# Patient Record
Sex: MALE | Race: WHITE | NOT HISPANIC OR LATINO | Employment: FULL TIME | URBAN - METROPOLITAN AREA
[De-identification: names, ages, dates, MRNs, and addresses within clinical notes are randomized per-mention and may not be internally consistent; named-entity substitution may affect disease eponyms.]

---

## 2016-01-18 LAB — HM COLONOSCOPY: NORMAL

## 2017-02-16 ENCOUNTER — APPOINTMENT (OUTPATIENT)
Dept: LAB | Facility: HOSPITAL | Age: 62
End: 2017-02-16
Payer: COMMERCIAL

## 2017-02-16 ENCOUNTER — TRANSCRIBE ORDERS (OUTPATIENT)
Dept: ADMINISTRATIVE | Facility: HOSPITAL | Age: 62
End: 2017-02-16

## 2017-02-16 DIAGNOSIS — Z12.5 ENCOUNTER FOR SCREENING FOR MALIGNANT NEOPLASM OF PROSTATE: ICD-10-CM

## 2017-02-16 DIAGNOSIS — I10 ESSENTIAL (PRIMARY) HYPERTENSION: ICD-10-CM

## 2017-02-16 DIAGNOSIS — Z00.00 ENCOUNTER FOR GENERAL ADULT MEDICAL EXAMINATION WITHOUT ABNORMAL FINDINGS: ICD-10-CM

## 2017-02-16 DIAGNOSIS — C85.91 NON-HODGKIN LYMPHOMA, UNSPECIFIED, LYMPH NODES OF HEAD, FACE, AND NECK (HCC): ICD-10-CM

## 2017-02-16 DIAGNOSIS — E78.5 HYPERLIPIDEMIA: ICD-10-CM

## 2017-02-16 LAB
ANION GAP SERPL CALCULATED.3IONS-SCNC: 11 MMOL/L (ref 4–13)
BUN SERPL-MCNC: 25 MG/DL (ref 5–25)
CALCIUM SERPL-MCNC: 9 MG/DL (ref 8.3–10.1)
CHLORIDE SERPL-SCNC: 103 MMOL/L (ref 100–108)
CHOLEST SERPL-MCNC: 221 MG/DL (ref 50–200)
CO2 SERPL-SCNC: 28 MMOL/L (ref 21–32)
CREAT SERPL-MCNC: 1.12 MG/DL (ref 0.6–1.3)
GFR SERPL CREATININE-BSD FRML MDRD: >60 ML/MIN/1.73SQ M
GLUCOSE SERPL-MCNC: 95 MG/DL (ref 65–140)
HDLC SERPL-MCNC: 45 MG/DL (ref 40–60)
LDLC SERPL CALC-MCNC: 157 MG/DL (ref 0–100)
POTASSIUM SERPL-SCNC: 4.1 MMOL/L (ref 3.5–5.3)
PSA SERPL-MCNC: 0.4 NG/ML (ref 0–4)
SODIUM SERPL-SCNC: 142 MMOL/L (ref 136–145)
TRIGL SERPL-MCNC: 97 MG/DL
TSH SERPL DL<=0.05 MIU/L-ACNC: 2.06 UIU/ML (ref 0.36–3.74)

## 2017-02-16 PROCEDURE — 84443 ASSAY THYROID STIM HORMONE: CPT

## 2017-02-16 PROCEDURE — 80048 BASIC METABOLIC PNL TOTAL CA: CPT

## 2017-02-16 PROCEDURE — G0103 PSA SCREENING: HCPCS

## 2017-02-16 PROCEDURE — 36415 COLL VENOUS BLD VENIPUNCTURE: CPT

## 2017-02-16 PROCEDURE — 80061 LIPID PANEL: CPT

## 2017-02-28 ENCOUNTER — ALLSCRIPTS OFFICE VISIT (OUTPATIENT)
Dept: OTHER | Facility: OTHER | Age: 62
End: 2017-02-28

## 2017-08-29 ENCOUNTER — ALLSCRIPTS OFFICE VISIT (OUTPATIENT)
Dept: OTHER | Facility: OTHER | Age: 62
End: 2017-08-29

## 2017-08-29 DIAGNOSIS — E78.5 HYPERLIPIDEMIA: ICD-10-CM

## 2017-08-29 LAB — OCCULT BLD, FECAL IMMUNOLOGICAL (HISTORICAL): NEGATIVE

## 2017-10-26 ENCOUNTER — GENERIC CONVERSION - ENCOUNTER (OUTPATIENT)
Dept: OTHER | Facility: OTHER | Age: 62
End: 2017-10-26

## 2017-10-26 ENCOUNTER — APPOINTMENT (OUTPATIENT)
Dept: LAB | Facility: HOSPITAL | Age: 62
End: 2017-10-26
Attending: INTERNAL MEDICINE
Payer: COMMERCIAL

## 2017-10-26 ENCOUNTER — TRANSCRIBE ORDERS (OUTPATIENT)
Dept: ADMINISTRATIVE | Facility: HOSPITAL | Age: 62
End: 2017-10-26

## 2017-10-26 DIAGNOSIS — E78.5 HYPERLIPIDEMIA: ICD-10-CM

## 2017-10-26 DIAGNOSIS — C85.91 NON-HODGKIN LYMPHOMA, UNSPECIFIED, LYMPH NODES OF HEAD, FACE, AND NECK (HCC): ICD-10-CM

## 2017-10-26 LAB
ALBUMIN SERPL BCP-MCNC: 4 G/DL (ref 3.5–5)
ALP SERPL-CCNC: 40 U/L (ref 46–116)
ALT SERPL W P-5'-P-CCNC: 30 U/L (ref 12–78)
ANION GAP SERPL CALCULATED.3IONS-SCNC: 8 MMOL/L (ref 4–13)
AST SERPL W P-5'-P-CCNC: 27 U/L (ref 5–45)
BASOPHILS # BLD AUTO: 0.04 THOUSANDS/ΜL (ref 0–0.1)
BASOPHILS NFR BLD AUTO: 1 % (ref 0–1)
BILIRUB SERPL-MCNC: 1.1 MG/DL (ref 0.2–1)
BUN SERPL-MCNC: 17 MG/DL (ref 5–25)
CALCIUM SERPL-MCNC: 8.5 MG/DL (ref 8.3–10.1)
CHLORIDE SERPL-SCNC: 104 MMOL/L (ref 100–108)
CHOLEST SERPL-MCNC: 222 MG/DL (ref 50–200)
CO2 SERPL-SCNC: 29 MMOL/L (ref 21–32)
CREAT SERPL-MCNC: 1.09 MG/DL (ref 0.6–1.3)
EOSINOPHIL # BLD AUTO: 0.14 THOUSAND/ΜL (ref 0–0.61)
EOSINOPHIL NFR BLD AUTO: 3 % (ref 0–6)
ERYTHROCYTE [DISTWIDTH] IN BLOOD BY AUTOMATED COUNT: 14.3 % (ref 11.6–15.1)
GFR SERPL CREATININE-BSD FRML MDRD: 71 ML/MIN/1.73SQ M
GLUCOSE P FAST SERPL-MCNC: 97 MG/DL (ref 65–99)
HCT VFR BLD AUTO: 50.7 % (ref 36.5–49.3)
HDLC SERPL-MCNC: 47 MG/DL (ref 40–60)
HGB BLD-MCNC: 16.9 G/DL (ref 12–17)
LDH SERPL-CCNC: 179 U/L (ref 81–234)
LDLC SERPL CALC-MCNC: 149 MG/DL (ref 0–100)
LYMPHOCYTES # BLD AUTO: 1.71 THOUSANDS/ΜL (ref 0.6–4.47)
LYMPHOCYTES NFR BLD AUTO: 31 % (ref 14–44)
MCH RBC QN AUTO: 29.4 PG (ref 26.8–34.3)
MCHC RBC AUTO-ENTMCNC: 33.3 G/DL (ref 31.4–37.4)
MCV RBC AUTO: 88 FL (ref 82–98)
MONOCYTES # BLD AUTO: 0.47 THOUSAND/ΜL (ref 0.17–1.22)
MONOCYTES NFR BLD AUTO: 9 % (ref 4–12)
NEUTROPHILS # BLD AUTO: 3.11 THOUSANDS/ΜL (ref 1.85–7.62)
NEUTS SEG NFR BLD AUTO: 56 % (ref 43–75)
PLATELET # BLD AUTO: 175 THOUSANDS/UL (ref 149–390)
PMV BLD AUTO: 10.7 FL (ref 8.9–12.7)
POTASSIUM SERPL-SCNC: 3.9 MMOL/L (ref 3.5–5.3)
PROT SERPL-MCNC: 7.2 G/DL (ref 6.4–8.2)
RBC # BLD AUTO: 5.75 MILLION/UL (ref 3.88–5.62)
SODIUM SERPL-SCNC: 141 MMOL/L (ref 136–145)
TRIGL SERPL-MCNC: 130 MG/DL
WBC # BLD AUTO: 5.47 THOUSAND/UL (ref 4.31–10.16)

## 2017-10-26 PROCEDURE — 85025 COMPLETE CBC W/AUTO DIFF WBC: CPT

## 2017-10-26 PROCEDURE — 83615 LACTATE (LD) (LDH) ENZYME: CPT

## 2017-10-26 PROCEDURE — 80053 COMPREHEN METABOLIC PANEL: CPT

## 2017-10-26 PROCEDURE — 80061 LIPID PANEL: CPT

## 2017-10-26 PROCEDURE — 36415 COLL VENOUS BLD VENIPUNCTURE: CPT

## 2017-10-30 ENCOUNTER — GENERIC CONVERSION - ENCOUNTER (OUTPATIENT)
Dept: OTHER | Facility: OTHER | Age: 62
End: 2017-10-30

## 2018-01-12 NOTE — PROGRESS NOTES
Assessment    1  Encounter for preventive health examination (V70 0) (Z00 00)   2  Screening for colon cancer (V76 51) (Z12 11)   3  Serrated adenoma of colon (211 3) (D12 6)   4  Intermittent claudication (443 9) (I73 9)   5  Apnea (786 03) (R06 81)   6  Snoring (786 09) (R06 83)   7  Need for tetanus booster (V03 7) (Z23)    Plan  Intermittent claudication    · VAS LOWER LIMB ARTERIAL DUPLEX, COMPLETE BILATERAL/GRAFTS;  SIDE:Bilateral; Status:Hold For - Scheduling; Requested for:2016;   Need for tetanus booster    · Adacel 5-15 5 LF-MCG/0 5 Intramuscular Suspension; INJECT 0 5  ML  Intramuscular; To Be Done: 28LFX2639  Serrated adenoma of colon    · COLONOSCOPY; Status:Active; Requested [id number];     Discussion/Summary  Impression: health maintenance visit  Currently, he eats a poor diet and has an inadequate exercise regimen  Prostate cancer screening: prostate cancer screening is current  Colorectal cancer screening: colorectal cancer screening is current  The immunizations will be given as outlined in the orders  Advice and education were given regarding aerobic exercise, weight bearing exercise and weight loss  Patient discussion: discussed with the patient, discussed with the patient's family  Also recommended sleep study d/t snoring and apnea - pt deferring - wishing to try for wgt loss    BW results D/w pt and LDL/TC elevated - diet/exercise/wgt loss d/w pt    ED will f/u with at next appt    LE claudication with dec RLE pulse - check LEAD  Chief Complaint  PE      History of Present Illness  HM, Adult Male: The patient is being seen for a health maintenance evaluation  The last health maintenance visit was few years  Social History: Household members include spouse  He is   Work status: working full time and occupation: material   The patient has never smoked cigarettes  He reports rare alcohol use and drinking 1 drinks per month   Alcohol concern:  no personal concern about alcohol use  General Health: The patient's health since the last visit is described as good  He has regular dental visits  He complains of vision problems  He has hearing loss  Immunizations status: not up to date   dentist in Oct - was told early gingivitis; wears glasses and just saw eye doc about year ago - goes every 2 yrs; deferring flu vaccine, is agreeable to tetanus vaccine  Lifestyle:  He does not have a healthy diet  He does not have any weight concerns  He does not exercise regularly  He does not use tobacco  He denies alcohol use  He denies drug use  he admits his diet could better - high carb intake but does eat some fruits and veggies  Reproductive health:  the patient is not sexually active  He complains of erectile dysfunction  Screening: Prostate cancer screening includes last prostate-specific antigen testing 2016  Colorectal cancer screening includes last colonoscopy done Jan 2016  Metabolic screening includes lipid profile performed 2016, glucose screening performed 2016 and thyroid function test performed 2016  Cardiovascular risk factors: high LDL cholesterol and obesity, but no diabetes  General health risks: no elevated prostate-specific antigen and no family history of prostate cancer  Safety elements used: seat belt  Risk findings: no depression symptoms  HPI: Had colonoscopy 1/18/16 - abnormal uptake on PET scan - path c/w serrated adenoma - pt has not heard back from GI yet    Wife notes apnea and snoring and fatigue    Pt c/o B/L LE leg pain with walking      Review of Systems    Constitutional: recent weight gain, but no fever and no chills  Eyes: no eyesight problems  ENT: no sore throat and no nasal discharge  Cardiovascular: intermittent leg claudication, but as noted in HPI, no chest pain and no palpitations  Respiratory: no shortness of breath and no cough  Gastrointestinal: no abdominal pain, no constipation and no diarrhea  Genitourinary: no dysuria, no urinary hesitancy and no nocturia  Musculoskeletal: no arthralgias and no myalgias  Integumentary: no rashes  Neurological: no headache and no dizziness  Psychiatric: no sleep disturbances and no depression  Endocrine: erectile dysfunction  Hematologic/Lymphatic: no tendency for easy bleeding and no tendency for easy bruising  Active Problems    1  Encounter for screening colonoscopy (V76 51) (Z12 11)   2  Lymphoma, head, face, or neck (202 81) (C85 91)   3  Obesity (278 00) (E66 9)   4  Prepatellar bursitis (726 65) (M70 40)   5  Scalp lesion (709 9) (L98 9)   6  Screening for endocrine, nutritional, metabolic and immunity disorder (V77 99) (Z13 9)   7  Screening for prostate cancer (V76 44) (Z12 5)   8  Shoulder joint pain, unspecified laterality (719 41) (M25 519)   9  Skin tag (701 9) (L91 8)   10  Umbilical hernia (673 0) (K42 9)    Past Medical History    · History of Abscess of arm, right (682 3) (L02 413)   · History of Abscess of skin (682 9) (L02 91)   · History of Arm wound (884 0) (S41 109A)   · History of herpes zoster (V12 09) (Z86 19)   · History of redness of eye (V12 49) (Z86 69)   · History of upper respiratory infection (V12 09) (Z87 09)   · History of Sebaceous cyst (706 2) (L72 3)   · History of Subconjunctival hemorrhage of left eye (372 72) (H11 32)    Surgical History    · History of Complete Colonoscopy   · History of Dental Surgery   · History of Incision And Drainage Of Skin Abscess   · History of Surgery Vas Deferens Vasectomy   · History of Tonsillectomy    Family History    · Family history of malignant neoplasm (V16 9) (Z80 9)    · Family history of Chronic Obstructive Pulmonary Disease    Social History    · Full-time employment   ·    · Never A Smoker   · Never Drank Alcohol    Current Meds   1  No Reported Medications Recorded    Allergies    1   No Known Drug Allergies    Vitals   Recorded: 84ELC2400 09:03AM   Temperature 96 4 F   Heart Rate 82   Systolic 937   Diastolic 82   Height 5 ft 8 in   Weight 239 lb    BMI Calculated 36 34   BSA Calculated 2 21     Physical Exam    Constitutional   General appearance: No acute distress, well appearing and well nourished  Ears, Nose, Mouth, and Throat   External inspection of ears and nose: Normal     Otoscopic examination: Tympanic membranes translucent with normal light reflex  Canals patent without erythema  Oropharynx: Normal with no erythema, edema, exudate or lesions  Neck   Neck: Supple, symmetric, trachea midline, no masses  Pulmonary   Respiratory effort: No increased work of breathing or signs of respiratory distress  Auscultation of lungs: Clear to auscultation  Cardiovascular   Auscultation of heart: Normal rate and rhythm, normal S1 and S2, no murmurs  dec R DP pulse  Examination of extremities for edema and/or varicosities: Normal     Abdomen   Abdomen: Non-tender, no masses  Anus, perineum, and rectum: Abnormal   hemorrhoids  Genitourinary   Digital rectal exam of prostate: Normal size, no masses  Lymphatic   Palpation of lymph nodes in neck: No lymphadenopathy  Musculoskeletal   Gait and station: Normal     Skin   Skin and subcutaneous tissue: Normal without rashes or lesions  Neurologic   Cranial nerves: Cranial nerves 2-12 intact  Psychiatric   Mood and affect: Normal        Results/Data  PHQ-2 Adult Depression Screening 22Jan2016 09:06AM User, Ahs     Test Name Result Flag Reference   PHQ-2 Adult Depression Score 1     Q1: 1, Q2: 0   PHQ-2 Adult Depression Screening Negative         Future Appointments    Date/Time Provider Specialty Site   10/03/2016 08:00 AM SARAN Matos   Hematology Oncology [facility]     Signatures   Electronically signed by : Elly Rojas DO; Jan 22 2016  9:56AM EST                       (Author)

## 2018-01-12 NOTE — RESULT NOTES
Discussion/Summary    please notify pt that his recent BW showed his cholesterol was still elevated but has improved slightly - total cholesterol went form 221 to 222 (should be below 200) and LDL "bad cholesterol" went from 157 to 149 - goal is <100, " HDL good cholesterol" and triglycerides were wnl, con't watching red meats/fatty foods and keep active, will d/w pt in detail at appt in March Pt aware         Verified Results  (1) LIPID PANEL, FASTING 26Oct2017 06:40AM Pj Gloria    Order Number: [id number]     Test Name Result Flag Reference   CHOLESTEROL 222 mg/dL H    HDL,DIRECT 47 mg/dL  40-60   Specimen collection should occur prior to Metamizole administration due to the potential for falsley depressed results  LDL CHOLESTEROL CALCULATED 149 mg/dL H 0-100   Triglyceride:        Normal <150 mg/dl   Borderline High 150-199 mg/dl   High 200-499 mg/dl   Very High >499 mg/dl      Cholesterol:       Desirable <200 mg/dl    Borderline High 200-239 mg/dl    High >239 mg/dl      HDL Cholesterol:       High>59 mg/dL    Low <41 mg/dL      This screening LDL is a calculated result  It does not have the accuracy of the Direct Measured LDL in the monitoring of patients with hyperlipidemia and/or statin therapy  Direct Measure LDL (APH909) must be ordered separately in these patients  TRIGLYCERIDES 130 mg/dL  <=150   Specimen collection should occur prior to N-Acetylcysteine or Metamizole administration due to the potential for falsely depressed results

## 2018-01-13 VITALS
TEMPERATURE: 96.7 F | HEART RATE: 88 BPM | SYSTOLIC BLOOD PRESSURE: 148 MMHG | HEIGHT: 68 IN | BODY MASS INDEX: 35.46 KG/M2 | BODY MASS INDEX: 37.61 KG/M2 | HEART RATE: 82 BPM | DIASTOLIC BLOOD PRESSURE: 88 MMHG | HEIGHT: 66 IN | WEIGHT: 234 LBS | DIASTOLIC BLOOD PRESSURE: 72 MMHG | SYSTOLIC BLOOD PRESSURE: 136 MMHG | TEMPERATURE: 97.8 F | WEIGHT: 234 LBS

## 2018-01-17 NOTE — RESULT NOTES
Verified Results  VAS LOWER LIMB ARTERIAL DUPLEX LIMITED - ARNAUD, PRESSURES, WAVEFORMS 51BZP1835 12:59PM Vernia Stairs     Test Name Result Flag Reference   VAS LOWER LIMB ARTERIAL DUPLEX LIMITED - ARNAUD, PRESSURES, WAVEFORMS (Report)     [facility] REPORT   CLINICAL:   Indications: PVD, Unspecified [I73 9] - Chemo and Radiation for lymphoma 4 yrs   ago, bilateral leg weakness noted within several months, w/ decreased Rt pedal   pulse  Denies claudication, rest pain  Some throbbing discomfort in thighs after   30 min walk  Clinical: Right Pressure: 132/ mm Hg, Left Pressure: 140/ mm Hg  FINDINGS:      Segment      Rig Left                    PSV PSV    Dist Post Tibial  160 160    Dist  Ant  Tibial 150 176             CONCLUSION:   Impression:   RIGHT LOWER LIMB:   This resting evaluation shows no evidence of significant lower extremity   arterial occlusive disease  Ankle/Brachial index: 1 14   Metatarsal is non-compressible  Great toe pressure of 124 mm Hg, supra-normal, may be unreliable      LEFT LOWER LIMB:   This resting evaluation shows no evidence of significant lower extremity   arterial occlusive disease  Ankle/Brachial index: 1 26, within normal limits   Metatarsal is non-compressible  Great toe pressure of 106 mm Hg, within the healing range      Recommend repeat testing in 1year as per protocol unless otherwise indicated  SIGNATURE:   Electronically Signed by: Stefan Johnson MD on 2016-02-03 05:27:48 PM        Pt aware  1      1 Amended By: Keith Malik;  Feb 04 2016 8:39 AM EST    Discussion/Summary   please notify pt that his Leg studies showed no sign of blockages in the arteries in the legs; no changes in meds at this time; will d/w pt further at next appt

## 2018-01-17 NOTE — PROGRESS NOTES
Assessment    1  Encounter for annual physical exam (V70 0) (Z00 00)   2  Hypertension (401 9) (I10)   3  Dyslipidemia (272 4) (E78 5)    Plan  Dyslipidemia    · (1) LIPID PANEL, FASTING; Status:Active; Requested for:54Ugd7606;   Encounter for annual physical exam    · Always use a seat belt and shoulder strap when riding or driving a motor vehicle ;  Status:Complete;   Done: 24UDV0709 04:37PM   · Brush your teeth {freq1} and floss at least once a day ; Status:Complete;   Done:  68Ros0022 04:37PM   · Drink plenty of fluids ; Status:Complete;   Done: 97WCE4471 04:37PM   · Eat a normal well-balanced diet ; Status:Complete;   Done: 81QFZ4002 04:37PM   · There are many exercise options for seniors ; Status:Complete;   Done: 85Ddw4636  04:37PM   · Use a sun block product with an SPF of 15 or more ; Status:Complete;   Done:  18QGA1154 04:37PM   · We encourage all of our patients to exercise regularly    30 minutes of exercise or physical  activity five or more days a week is recommended for children and adults ;  Status:Complete;   Done: 77Tjt1491 04:37PM   · We recommend routine visits to a dentist ; Status:Complete;   Done: 28FNJ6121  04:37PM   · We recommend that you bring your body mass index down to 26 ; Status:Complete;    Done: 78Ogt5375 04:37PM   · Hemoccult Screening - POC; Status:Active - Perform Order; Requested for:91Mji8046;   Hypertension    · Continue with our present treatment plan ; Status:Complete;   Done: 80Viq8949  04:39PM   · Restrict the salt in your diet by avoiding highly salted foods ; Status:Complete;   Done:  31XMZ0036 04:39PM   · There are many exercise options for seniors ; Status:Complete;   Done: 93Xar8363  04:39PM   · We recommend that you bring your body mass index down to 26 ; Status:Complete;    Done: 33Sdd0568 04:39PM   · Call [phone] if: You become dizzy or lightheaded, especially when you stand up  after sitting for a while ; Status:Complete;   Done: 54SSU4835 04:39PM   · Call [phone] if: You develop double vision (see two of everything) ;  Status:Complete;   Done: 93Rks0830 04:39PM   · Call 911 if: You experience a new kind of chest pain (angina) or pressure ;  Status:Complete;   Done: 37BSS0076 04:39PM   · Call 911 if: You have any symptoms of a stroke ; Status:Complete;   Done: 05RAF8779  04:39PM   · Seek Immediate Medical Attention if: You have a severe headache that will not go away ;  Status:Complete;   Done: 04Krg8968 04:39PM    Discussion/Summary  health maintenance visit Currently, he eats a poor diet and has an inadequate exercise regimen  Prostate cancer screening: prostate cancer screening is current  Colorectal cancer screening: colorectal cancer screening is current  Screening lab work includes lipid profile  The patient declines immunizations  Advice and education were given regarding nutrition, aerobic exercise, seat belt use and sunscreen use  Patient discussion: discussed with the patient  BP up today but not usually this high - will re-eval at next appt and if still high will need to adjust BP meds    Dyslipidemia -due for FLP - order given to be done with BW already ordered by Dr Hesham Escoto for Oct, urged to watch red meats/fatty foods and keep active  The patient was counseled regarding instructions for management, risk factor reductions, impressions  Chief Complaint  PE      History of Present Illness  HM, Adult Male: The patient is being seen for a health maintenance evaluation  The last health maintenance visit was 1 1/2 year(s) ago  Social History: Household members include spouse  He is   Work status: working full time and occupation: [employer]  work   The patient has never smoked cigarettes  He reports occasional alcohol use and drinking 1 drinks per week  Alcohol concern:  no personal concern about alcohol use  He has never used illicit drugs  General Health: The patient's health since the last visit is described as good   He has regular dental visits  He denies vision problems  He has hearing loss  Immunizations status:  dentist every 6 mos - has a broken tooth he has to have crown to put on it, wears glasses all the time, redness to L conjunctiva, slow and slight loss of hearing  Lifestyle:  He consumes a diverse and healthy diet  He does not have any weight concerns  He does not exercise regularly  He does not use tobacco  He consumes alcohol  He denies drug use  balanced diet but admits portion size could be better, he does no formal exercise  Reproductive health:  the patient is sexually active  He complains of erectile dysfunction  Screening: Prostate cancer screening includes last prostate-specific antigen testing Feb 2017  Colorectal cancer screening includes last colonoscopy done 2016  Metabolic screening includes lipid profile performed Feb 2017, glucose screening performed Feb 2017 and thyroid function test performed Feb 2017  Cardiovascular risk factors: hypertension, high LDL cholesterol and sedentary lifestyle, but no diabetes and no tobacco use  General health risks: no elevated prostate-specific antigen and no family history of prostate cancer  Safety elements used: seat belt and sunscreen  Risk findings: no depression symptoms  Review of Systems    Constitutional: recent 2 lb weight loss, but no fever and no chills  Eyes: no eyesight problems  ENT: no sore throat and no nasal discharge  Cardiovascular: no chest pain, no palpitations and no extremity edema  Respiratory: no shortness of breath, no cough and no wheezing  Gastrointestinal: no abdominal pain and no constipation  Genitourinary: no dysuria, no urinary hesitancy and no nocturia  Musculoskeletal: no arthralgias and no myalgias  Integumentary: no rashes  Neurological: no headache and no dizziness  Psychiatric: no anxiety and no depression  Endocrine: erectile dysfunction, but no muscle weakness     Hematologic/Lymphatic: no tendency for easy bleeding and no tendency for easy bruising  Active Problems    1  Apnea (786 03) (R06 81)   2  Colonoscopy (Fiberoptic) Screening   3  Dyslipidemia (272 4) (E78 5)   4  Encounter for screening colonoscopy (V76 51) (Z12 11)   5  Hypertension (401 9) (I10)   6  Intermittent claudication (443 9) (I73 9)   7  Lower back pain (724 2) (M54 5)   8  Lymphoma, head, face, or neck (202 81) (C85 91)   9  Macular degeneration (362 50) (H35 30)   10  Need for tetanus booster (V03 7) (Z23)   11  Obesity (278 00) (E66 9)   12  Scalp lesion (709 9) (L98 9)   13  Screening for colon cancer (V76 51) (Z12 11)   14  Screening for prostate cancer (V76 44) (Z12 5)   15  Serrated adenoma of colon (211 3) (D12 6)   16  Shoulder joint pain, unspecified laterality   17  Skin tag (701 9) (L91 8)   18  Snoring (786 09) (R06 83)   19   Umbilical hernia (239 3) (K42 9)    Past Medical History    · History of Abscess of arm, right (682 3) (L02 413)   · History of Abscess of skin (682 9) (L02 91)   · Denied: History of Alcohol abuse   · History of Arm wound (884 0) (S41 109A)   · Denied: History of depression   · History of herpes zoster (V12 09) (Z86 19)   · History of redness of eye (V12 49) (Z86 69)   · Denied: History of substance abuse   · History of upper respiratory infection (V12 09) (Z87 09)   · History of Prepatellar bursitis (726 65) (M70 40)   · History of Screening for endocrine, nutritional, metabolic and immunity disorder (V77 99)  (Z13 29,Z13 0,Z13 21,Z13 228)   · History of Sebaceous cyst (706 2) (L72 3)   · History of Subconjunctival hemorrhage of left eye (372 72) (H11 32)    Surgical History    · History of Complete Colonoscopy   · History of Dental Surgery   · History of Incision And Drainage Of Skin Abscess   · History of Surgery Vas Deferens Vasectomy   · History of Tonsillectomy    Family History  Mother    · Denied: Family history of Alcohol abuse   · Denied: Family history of depression   · Family history of malignant neoplasm (V16 9) (Z80 9)   · Denied: Family history of substance abuse  Father    · Denied: Family history of Alcohol abuse   · Family history of Chronic Obstructive Pulmonary Disease   · Denied: Family history of depression   · Denied: Family history of substance abuse  Sibling    · Denied: Family history of Alcohol abuse   · Denied: Family history of depression   · Denied: Family history of substance abuse    Social History    · Full-time employment   ·    · Never A Smoker   · Never Drank Alcohol    Current Meds   1  HydroCHLOROthiazide 25 MG Oral Tablet; TAKE 1 TABLET DAILY; Therapy: 33Hhc4005 to (Evaluate:58Quj6362)  Requested for: 25YQU8847; Last   Rx:87Otz2709 Ordered    Allergies    1  No Known Drug Allergies    Vitals   Recorded: 76Pff2122 04:07PM   Temperature 97 8 F   Heart Rate 88   Systolic 849   Diastolic 88   Height 5 ft 6 in   Weight 234 lb    BMI Calculated 37 77   BSA Calculated 2 14     Physical Exam    Constitutional   General appearance: No acute distress, well appearing and well nourished  Eyes   Conjunctiva and lids: No erythema, swelling or discharge  Pupils and irises: Equal, round, reactive to light  Ears, Nose, Mouth, and Throat   External inspection of ears and nose: Normal     Otoscopic examination: Tympanic membranes translucent with normal light reflex  Canals patent without erythema  Hearing: Normal     Oropharynx: Normal with no erythema, edema, exudate or lesions  Neck   Neck: Supple, symmetric, trachea midline, no masses  Pulmonary   Respiratory effort: No increased work of breathing or signs of respiratory distress  Auscultation of lungs: Clear to auscultation  Cardiovascular   Auscultation of heart: Normal rate and rhythm, normal S1 and S2, no murmurs  Carotid pulses: 2+ bilaterally  Examination of extremities for edema and/or varicosities: Normal     Abdomen   Abdomen: Non-tender, no masses      Anus, perineum, and rectum: Normal sphincter tone, no masses, no prolapse  Stool sample for occult blood: Negative  Genitourinary   Digital rectal exam of prostate: Normal size, no masses  Lymphatic   Palpation of lymph nodes in neck: No lymphadenopathy  Musculoskeletal   Gait and station: Normal     Skin   Skin and subcutaneous tissue: Normal without rashes or lesions  Psychiatric   Judgment and insight: Normal     Mood and affect: Normal        Results/Data  PHQ-2 Adult Depression Screening 09Fsk5807 04:11PM User, Ahs     Test Name Result Flag Reference   PHQ-2 Adult Depression Score 0     Over the last two weeks, how often have you been bothered by any of the following problems? Little interest or pleasure in doing things: Not at all - 0  Feeling down, depressed, or hopeless: Not at all - 0   PHQ-2 Adult Depression Screening Negative         Future Appointments    Date/Time Provider Specialty Site   10/16/2017 08:00 AM SARAN Pino   Hematology Oncology [facility]     Signatures   Electronically signed by : Jillian Rea DO; Aug 29 2017  4:40PM EST                       (Author)

## 2018-01-22 VITALS
RESPIRATION RATE: 16 BRPM | SYSTOLIC BLOOD PRESSURE: 130 MMHG | OXYGEN SATURATION: 97 % | HEIGHT: 66 IN | BODY MASS INDEX: 38.73 KG/M2 | DIASTOLIC BLOOD PRESSURE: 82 MMHG | HEART RATE: 64 BPM | WEIGHT: 241 LBS | TEMPERATURE: 97.5 F

## 2018-02-20 PROBLEM — H35.30 MACULAR DEGENERATION: Status: ACTIVE | Noted: 2017-02-28

## 2018-02-20 RX ORDER — HYDROCHLOROTHIAZIDE 25 MG/1
1 TABLET ORAL DAILY
COMMUNITY
Start: 2016-04-25 | End: 2018-03-01 | Stop reason: DRUGHIGH

## 2018-02-26 ENCOUNTER — TELEPHONE (OUTPATIENT)
Dept: HEMATOLOGY ONCOLOGY | Facility: HOSPITAL | Age: 63
End: 2018-02-26

## 2018-02-26 NOTE — TELEPHONE ENCOUNTER
If this is just an infection, I would have him follow up with the oral surgeon  If he is concerned he can always schedule a follow up appointment to see Dr Joshua Dumas to discuss

## 2018-02-28 NOTE — TELEPHONE ENCOUNTER
Patient stopped in to clarify whether he should schedule appt with Dr Jimbo Dawson  After discussion with Marjorie Lane RN I told him he should see oral surgeon first to determine if this is in fact an infection

## 2018-03-01 ENCOUNTER — OFFICE VISIT (OUTPATIENT)
Dept: FAMILY MEDICINE CLINIC | Facility: HOSPITAL | Age: 63
End: 2018-03-01
Payer: COMMERCIAL

## 2018-03-01 VITALS
HEIGHT: 68 IN | DIASTOLIC BLOOD PRESSURE: 84 MMHG | HEART RATE: 72 BPM | SYSTOLIC BLOOD PRESSURE: 152 MMHG | BODY MASS INDEX: 36.68 KG/M2 | TEMPERATURE: 98 F | WEIGHT: 242 LBS

## 2018-03-01 DIAGNOSIS — I10 HYPERTENSION, UNSPECIFIED TYPE: ICD-10-CM

## 2018-03-01 DIAGNOSIS — C85.91 LYMPHOMA, HEAD, FACE, OR NECK: Primary | ICD-10-CM

## 2018-03-01 DIAGNOSIS — E78.5 DYSLIPIDEMIA: ICD-10-CM

## 2018-03-01 PROCEDURE — 99214 OFFICE O/P EST MOD 30 MIN: CPT | Performed by: INTERNAL MEDICINE

## 2018-03-01 RX ORDER — LISINOPRIL AND HYDROCHLOROTHIAZIDE 12.5; 1 MG/1; MG/1
1 TABLET ORAL DAILY
Qty: 90 TABLET | Refills: 0 | Status: SHIPPED | OUTPATIENT
Start: 2018-03-01 | End: 2018-04-13 | Stop reason: SINTOL

## 2018-03-01 NOTE — ASSESSMENT & PLAN NOTE
LDL/TC not at goal - RF for CVD reviewed - 10 yr risk for ASCVD was reviewed and was elevated at 20 3%, risk with optimum RF modification was 7 3 %, will give pt 6 mos of lifestyle modification and if not better will need statin - low fat/cholesterol diet/exercise/wgt loss reviewed again

## 2018-03-01 NOTE — PROGRESS NOTES
Assessment/Plan:    Hypertension  BP not at goal - STOP HCTZ 25 mg and change to Lisinopril-HCT 10/12 5 1 tab PO q day, advised to watch salt in diet and to start exercise and get wgt down, recheck BP in 6 wk, call with SE or issues    Lymphoma, head, face, or neck (HCC)  Very anxious as similar symptoms as when lymphoma was dx at same exact site - has appt with oral surgeon next week, told to call with any issue    Dyslipidemia  LDL/TC not at goal - RF for CVD reviewed - 10 yr risk for ASCVD was reviewed and was elevated at 20 3%, risk with optimum RF modification was 7 3 %, will give pt 6 mos of lifestyle modification and if not better will need statin - low fat/cholesterol diet/exercise/wgt loss reviewed again       Diagnoses and all orders for this visit:    Lymphoma, head, face, or neck (HCC)    Dyslipidemia    Hypertension, unspecified type  -     lisinopril-hydrochlorothiazide (PRINZIDE,ZESTORETIC) 10-12 5 MG per tablet; Take 1 tablet by mouth daily for 90 days          Subjective:      Patient ID: Alhaji Males is a 57 y o  male  HPI Pt here for routine follow up appt    Pt has seen Dr Vanessa Lennon for f/u lymphoma - dx 6 yrs ago  He was given order for BW to be done Oct 2018 and told to f/u annually  He has been having issues with 3-4 wks of sore gums and inflammation around tooth where his cancer started  He saw his nml dentist last week and was told to f/u with oral surgeon  He notes no F/C/wgt loss/night sweat/rashes  He was told to f/u with oral surgeon and he has appt next week  Pt had FLP done in Oct - results d/w pt in detail: , ,  and HDL 47  Goal FLP reviewed  RF for CVD reviewed  Diet/exercise reviewed  He states his diet is not high in red meats and he does eat a lot of fish  He could cut back on fatty foods and sweets  He does no formal exercise  BP above goal again today and meds were reviewed and no changes have occurred    He denies missing doses of meds or SE with the meds  He does not check his BP outside the office  He notes no frequent Ha's/dizziness/double vision/CP  He has noted issues with his R arm falling asleep when he lays on his R side  He notes no symptoms at all during the day and it resolves around 15 - 20 min after waking up  Review of Systems   Constitutional: Negative for chills, fatigue, fever and unexpected weight change  HENT: Positive for mouth sores  Negative for congestion and sinus pain  Eyes: Negative for pain and redness  Respiratory: Negative for cough, chest tightness and shortness of breath  Cardiovascular: Negative for chest pain, palpitations and leg swelling  Gastrointestinal: Negative for abdominal pain, blood in stool, constipation, diarrhea, nausea and vomiting  Endocrine: Negative for polydipsia and polyuria  Genitourinary: Negative for difficulty urinating and dysuria  Musculoskeletal: Negative for arthralgias and myalgias  Skin: Negative for rash and wound  Neurological: Negative for dizziness and headaches  Hematological: Does not bruise/bleed easily  Psychiatric/Behavioral: Negative for behavioral problems and confusion  Objective:  /84   Pulse 72   Temp 98 °F (36 7 °C)   Ht 5' 8" (1 727 m)   Wt 110 kg (242 lb)   BMI 36 80 kg/m²      Physical Exam   Constitutional: He appears well-developed and well-nourished  HENT:   Head: Normocephalic and atraumatic  Right Ear: External ear normal    Left Ear: External ear normal    Mouth/Throat: No oropharyngeal exudate  R upper incisor with erythema and inflammation at gum - no ulceration present   Eyes: Conjunctivae are normal  Right eye exhibits no discharge  Left eye exhibits no discharge  Neck: Neck supple  No tracheal deviation present  Cardiovascular: Normal rate and regular rhythm  No murmur heard  Pulmonary/Chest: Effort normal and breath sounds normal  No respiratory distress  He has no wheezes   He has no rales    Abdominal: Soft  He exhibits no distension  There is no tenderness  Musculoskeletal: He exhibits no edema  Lymphadenopathy:     He has no cervical adenopathy  Neurological: He is alert  He exhibits normal muscle tone  Skin: Skin is warm and dry  No rash noted  Psychiatric: He has a normal mood and affect  His behavior is normal    Nursing note and vitals reviewed

## 2018-03-01 NOTE — ASSESSMENT & PLAN NOTE
BP not at goal - STOP HCTZ 25 mg and change to Lisinopril-HCT 10/12 5 1 tab PO q day, advised to watch salt in diet and to start exercise and get wgt down, recheck BP in 6 wk, call with SE or issues

## 2018-03-01 NOTE — ASSESSMENT & PLAN NOTE
Very anxious as similar symptoms as when lymphoma was dx at same exact site - has appt with oral surgeon next week, told to call with any issue

## 2018-04-13 ENCOUNTER — OFFICE VISIT (OUTPATIENT)
Dept: FAMILY MEDICINE CLINIC | Facility: HOSPITAL | Age: 63
End: 2018-04-13
Payer: COMMERCIAL

## 2018-04-13 VITALS
DIASTOLIC BLOOD PRESSURE: 90 MMHG | HEIGHT: 68 IN | TEMPERATURE: 98.4 F | SYSTOLIC BLOOD PRESSURE: 158 MMHG | WEIGHT: 238 LBS | BODY MASS INDEX: 36.07 KG/M2 | HEART RATE: 78 BPM

## 2018-04-13 DIAGNOSIS — Z12.5 SCREENING FOR PROSTATE CANCER: ICD-10-CM

## 2018-04-13 DIAGNOSIS — R05.8 COUGH DUE TO ACE INHIBITOR: ICD-10-CM

## 2018-04-13 DIAGNOSIS — R20.2 PARESTHESIAS IN RIGHT HAND: ICD-10-CM

## 2018-04-13 DIAGNOSIS — Z13.29 SCREENING FOR THYROID DISORDER: ICD-10-CM

## 2018-04-13 DIAGNOSIS — E78.5 DYSLIPIDEMIA: ICD-10-CM

## 2018-04-13 DIAGNOSIS — I10 ESSENTIAL HYPERTENSION: Primary | ICD-10-CM

## 2018-04-13 DIAGNOSIS — T46.4X5A COUGH DUE TO ACE INHIBITOR: ICD-10-CM

## 2018-04-13 PROCEDURE — 99214 OFFICE O/P EST MOD 30 MIN: CPT | Performed by: INTERNAL MEDICINE

## 2018-04-13 RX ORDER — HYDROCHLOROTHIAZIDE 25 MG/1
25 TABLET ORAL DAILY
Qty: 30 TABLET | Refills: 3 | Status: SHIPPED | OUTPATIENT
Start: 2018-04-13 | End: 2018-07-31 | Stop reason: SDUPTHER

## 2018-04-13 RX ORDER — AMLODIPINE BESYLATE 5 MG/1
5 TABLET ORAL DAILY
Qty: 30 TABLET | Refills: 2 | Status: SHIPPED | OUTPATIENT
Start: 2018-04-13 | End: 2018-05-31 | Stop reason: SDUPTHER

## 2018-04-13 NOTE — ASSESSMENT & PLAN NOTE
Pt with SE with lisinopril-HCT and no real benefit noted in his readings - will D/C lisinopril-HCT and start HCTZ 25 mg 1 tab PO q day and Amlodipine 5 mg 1 tab PO q day, recheck BP in 4-6 wks, again low sodium diet and avoiding NSAIDs was encouraged as was wgt loss

## 2018-04-13 NOTE — PROGRESS NOTES
Assessment/Plan:    Dyslipidemia  Pt due for FLP the end of this month; 10 yr ASCVD risk was calculated last time at 20 3% and 6 mo trial of diet/exercise was reviewed - if not better will need to review statin benefit and start rx    Hypertension  Pt with SE with lisinopril-HCT and no real benefit noted in his readings - will D/C lisinopril-HCT and start HCTZ 25 mg 1 tab PO q day and Amlodipine 5 mg 1 tab PO q day, recheck BP in 4-6 wks, again low sodium diet and avoiding NSAIDs was encouraged as was wgt loss       Diagnoses and all orders for this visit:    Essential hypertension  -     hydrochlorothiazide (HYDRODIURIL) 25 mg tablet; Take 1 tablet (25 mg total) by mouth daily for 30 days  -     amLODIPine (NORVASC) 5 mg tablet; Take 1 tablet (5 mg total) by mouth daily for 30 days  -     TSH, 3rd generation with T4 reflex; Future    Cough due to ACE inhibitor  Comments:  D/w pt that cough poss d/t ACE vs chronic post nasal drip - will stop ACE and re-eval next appt - if not better will need Flonase/OTC antihistamine and CXR    Paresthesias in right hand  Comments:  D/w pt that sounds c/w cubital tunnel syndrome/ulnar nerve compression, symptoms usually associated with pressure on forearm/certain position- advised to avoid these positions, if not better will need EMG or poss Ortho eval - pt voiced understanding    Dyslipidemia  -     Lipid panel; Future  -     TSH, 3rd generation with T4 reflex; Future    Screening for prostate cancer  -     PSA, Total Screen; Future    Screening for thyroid disorder  -     TSH, 3rd generation with T4 reflex; Future      [place] Jan 2016    Subjective:      Patient ID: Margaret Zepeda is a 57 y o  male  HPI Pt here for BP check    Last visit pts BP was elevated at 152/84 and we subsequently changed his HCTZ 25 mg q day to lisinopril - HCT 10/12 5 1 tab PO q day  Pt was advised to watch sodium in diet as well as increase exercise and geet wgt down    He is here today for BP check  Wgt is down 4 lbs from last appt but BP is more elevated  He has noted a cough and hoarseness since starting the medication  He does note chronic postnasal for years  He notes no other Se  He did only check BP once since last appt but thinks it was about what it was today  He notes no frequent Ha's/dizziness/double vision/Cp/palp  He notes no SOB/wheezing/F/C with the cough/hoarseness  Pt con't to notes numbness and tingling of RUE intermittently for some time  It was at first just when he woke up and seemed to be d/t laying on his R side  He than began to note the symptoms if he leaned on his R forearm  Now he has started fishing again and notes the symptoms if he is holding his fishing pole for long periods  He notes no significant weakness/dropping objects  He is due for cholesterol and TFTs/PSA at the end of April      [facility] 2016    Review of Systems   Constitutional: Negative for chills, fever and unexpected weight change  HENT: Positive for postnasal drip  Negative for congestion and sinus pain  Eyes: Negative for pain and redness  Respiratory: Positive for cough  Negative for chest tightness and shortness of breath  Cardiovascular: Negative for chest pain, palpitations and leg swelling  Gastrointestinal: Negative for abdominal pain, diarrhea, nausea and vomiting  Endocrine: Negative for polydipsia and polyuria  Genitourinary: Negative for difficulty urinating and dysuria  Skin: Negative for rash and wound  Neurological: Negative for dizziness, weakness and headaches  Hematological: Does not bruise/bleed easily  Psychiatric/Behavioral: Negative for behavioral problems and confusion  Objective:    /90   Pulse 78   Temp 98 4 °F (36 9 °C)   Ht 5' 8" (1 727 m)   Wt 108 kg (238 lb)   BMI 36 19 kg/m²      Physical Exam   Constitutional: He appears well-developed and well-nourished  No distress  HENT:   Head: Normocephalic and atraumatic  Mouth/Throat: No oropharyngeal exudate  Eyes: Conjunctivae are normal  Right eye exhibits no discharge  Left eye exhibits no discharge  Neck: Neck supple  No tracheal deviation present  Cardiovascular: Normal rate and regular rhythm  No murmur heard  Pulmonary/Chest: Effort normal and breath sounds normal  No respiratory distress  He has no wheezes  He has no rales  Abdominal: Soft  He exhibits no distension  There is no tenderness  Musculoskeletal: He exhibits no edema  nml gait  5/5 B/L UE muscle strength   Neurological: He is alert  He has normal reflexes  He exhibits normal muscle tone  Sensation intact to light touch B/L UE  2/4 bicep reflexes B/L   Skin: Skin is warm and dry  No rash noted  Psychiatric: He has a normal mood and affect  His behavior is normal    Nursing note and vitals reviewed

## 2018-04-13 NOTE — ASSESSMENT & PLAN NOTE
Pt due for FLP the end of this month; 10 yr ASCVD risk was calculated last time at 20 3% and 6 mo trial of diet/exercise was reviewed - if not better will need to review statin benefit and start rx

## 2018-05-03 ENCOUNTER — OFFICE VISIT (OUTPATIENT)
Dept: FAMILY MEDICINE CLINIC | Facility: HOSPITAL | Age: 63
End: 2018-05-03
Payer: COMMERCIAL

## 2018-05-03 ENCOUNTER — HOSPITAL ENCOUNTER (OUTPATIENT)
Dept: RADIOLOGY | Facility: HOSPITAL | Age: 63
Discharge: HOME | End: 2018-05-03
Payer: COMMERCIAL

## 2018-05-03 VITALS
WEIGHT: 234.8 LBS | SYSTOLIC BLOOD PRESSURE: 128 MMHG | BODY MASS INDEX: 35.58 KG/M2 | DIASTOLIC BLOOD PRESSURE: 82 MMHG | TEMPERATURE: 98.6 F | HEIGHT: 68 IN | HEART RATE: 76 BPM

## 2018-05-03 DIAGNOSIS — M25.562 ACUTE PAIN OF LEFT KNEE: Primary | ICD-10-CM

## 2018-05-03 DIAGNOSIS — M25.562 ACUTE PAIN OF LEFT KNEE: ICD-10-CM

## 2018-05-03 PROCEDURE — 99214 OFFICE O/P EST MOD 30 MIN: CPT | Performed by: NURSE PRACTITIONER

## 2018-05-03 PROCEDURE — 73562 X-RAY EXAM OF KNEE 3: CPT

## 2018-05-03 RX ORDER — MELOXICAM 15 MG/1
15 TABLET ORAL DAILY
Qty: 30 TABLET | Refills: 0 | Status: SHIPPED | OUTPATIENT
Start: 2018-05-03 | End: 2018-05-31

## 2018-05-03 NOTE — PROGRESS NOTES
Assessment/Plan:     Acute left knee pain w/o known injury  Concern for possible MCL injury  Will start with xray but will ultimately need MRI  Script for meloxicam  Instructed no other NSAIDs  Diagnoses and all orders for this visit:    Acute pain of left knee  -     XR knee 3 vw left non injury; Future  -     meloxicam (MOBIC) 15 mg tablet; Take 1 tablet (15 mg total) by mouth daily          Subjective:     Patient ID: Geoff Grover is a 57 y o  male  Left knee pain  Started about 1 week ago  Bending makes worse  Turning to side makes worse  Started after he took leg out of car to get gas  No pain at first but then as day went on pain worsened  Able to bear weight  Some redness but not swelling  Not hot or warm  Doing both moist heat and ice  Does not make better  Was taking OTC arthritis med  Helps a little  Very stiff first thing in the morning  Constant aching pain  Denies any injury or previous problems  2-3/10 pain  Unable to do his normal activities  Denies N/T  Review of Systems   Constitutional: Negative for chills and fever  Musculoskeletal: Positive for arthralgias  Negative for joint swelling  Neurological: Negative for weakness and numbness  The following portions of the patient's history were reviewed and updated as appropriate: allergies, current medications, past family history, past medical history, past social history, past surgical history and problem list     Objective:  Vitals:    05/03/18 1533   BP: 128/82   Pulse: 76   Temp: 98 6 °F (37 °C)      Physical Exam   Constitutional: He appears well-developed and well-nourished  Cardiovascular: Normal rate, regular rhythm and normal heart sounds  Pulmonary/Chest: Effort normal and breath sounds normal    Musculoskeletal:        Left knee: He exhibits swelling  He exhibits no erythema  Tenderness found  Medial joint line tenderness noted  Limping favoring left side  Pain worst with extension     Pain with valgus and varus stress

## 2018-05-07 ENCOUNTER — TELEPHONE (OUTPATIENT)
Dept: FAMILY MEDICINE CLINIC | Facility: HOSPITAL | Age: 63
End: 2018-05-07

## 2018-05-08 ENCOUNTER — TELEPHONE (OUTPATIENT)
Dept: FAMILY MEDICINE CLINIC | Facility: HOSPITAL | Age: 63
End: 2018-05-08

## 2018-05-29 ENCOUNTER — APPOINTMENT (OUTPATIENT)
Dept: LAB | Facility: HOSPITAL | Age: 63
End: 2018-05-29
Payer: COMMERCIAL

## 2018-05-29 DIAGNOSIS — Z13.29 SCREENING FOR THYROID DISORDER: ICD-10-CM

## 2018-05-29 DIAGNOSIS — I10 ESSENTIAL HYPERTENSION: ICD-10-CM

## 2018-05-29 DIAGNOSIS — Z12.5 SCREENING FOR PROSTATE CANCER: ICD-10-CM

## 2018-05-29 DIAGNOSIS — E78.5 DYSLIPIDEMIA: ICD-10-CM

## 2018-05-29 LAB
CHOLEST SERPL-MCNC: 203 MG/DL (ref 50–200)
HDLC SERPL-MCNC: 53 MG/DL (ref 40–60)
LDLC SERPL CALC-MCNC: 135 MG/DL (ref 0–100)
NONHDLC SERPL-MCNC: 150 MG/DL
PSA SERPL-MCNC: 0.4 NG/ML (ref 0–4)
TRIGL SERPL-MCNC: 77 MG/DL
TSH SERPL DL<=0.05 MIU/L-ACNC: 1.72 UIU/ML (ref 0.36–3.74)

## 2018-05-29 PROCEDURE — 84443 ASSAY THYROID STIM HORMONE: CPT

## 2018-05-29 PROCEDURE — 36415 COLL VENOUS BLD VENIPUNCTURE: CPT

## 2018-05-29 PROCEDURE — G0103 PSA SCREENING: HCPCS

## 2018-05-29 PROCEDURE — 80061 LIPID PANEL: CPT

## 2018-05-31 ENCOUNTER — OFFICE VISIT (OUTPATIENT)
Dept: FAMILY MEDICINE CLINIC | Facility: HOSPITAL | Age: 63
End: 2018-05-31
Payer: COMMERCIAL

## 2018-05-31 VITALS
TEMPERATURE: 98.2 F | BODY MASS INDEX: 35.31 KG/M2 | DIASTOLIC BLOOD PRESSURE: 90 MMHG | HEART RATE: 67 BPM | HEIGHT: 68 IN | SYSTOLIC BLOOD PRESSURE: 142 MMHG | WEIGHT: 233 LBS

## 2018-05-31 DIAGNOSIS — M25.562 LEFT KNEE PAIN, UNSPECIFIED CHRONICITY: ICD-10-CM

## 2018-05-31 DIAGNOSIS — I10 ESSENTIAL HYPERTENSION: ICD-10-CM

## 2018-05-31 DIAGNOSIS — E78.5 DYSLIPIDEMIA: Primary | ICD-10-CM

## 2018-05-31 PROCEDURE — 99214 OFFICE O/P EST MOD 30 MIN: CPT | Performed by: INTERNAL MEDICINE

## 2018-05-31 RX ORDER — AMLODIPINE BESYLATE 10 MG/1
10 TABLET ORAL DAILY
Qty: 30 TABLET | Refills: 3 | Status: SHIPPED | OUTPATIENT
Start: 2018-05-31 | End: 2018-07-31 | Stop reason: SDUPTHER

## 2018-05-31 NOTE — ASSESSMENT & PLAN NOTE
FLP improved but not yet at goal, admits diet is not so good so urged to really start watching red meats/fatty foods and eat lean meats/fruits/veggies and start regular exercise, recheck in 6 mos

## 2018-05-31 NOTE — ASSESSMENT & PLAN NOTE
Bp seems to responding nicely to amlodipine - not yet at goal so will increase to 10 mg 1 tab PO q day, con't current HCTZ, recheck in 6 wks

## 2018-05-31 NOTE — PROGRESS NOTES
Assessment/Plan:    Hypertension  Bp seems to responding nicely to amlodipine - not yet at goal so will increase to 10 mg 1 tab PO q day, con't current HCTZ, recheck in 6 wks    Dyslipidemia  FLP improved but not yet at goal, admits diet is not so good so urged to really start watching red meats/fatty foods and eat lean meats/fruits/veggies and start regular exercise, recheck in 6 mos       Diagnoses and all orders for this visit:    Dyslipidemia    Essential hypertension  -     amLODIPine (NORVASC) 10 mg tablet; Take 1 tablet (10 mg total) by mouth daily for 30 days    Left knee pain, unspecified chronicity  Comments:  Saw [facility] and was given injection and used brace - finished Mobic and is now feeling better, R knee popping/clicking but no significant pain, con't to monitor and f/u with Ortho if pain relapses          Subjective:      Patient ID: Yoana Armstrong is a 57 y o  male  HPI Pt here for follow up appt    Last visit in mid April we stopped pts Lisinopril-HCT and switched him to Norvasc 5 mg 1 tab daily and HCTZ 25 mg 1 tab PO q day  He was counseled on low sodium diet and avoiding NSAIDs  He is here today for BP check  He did see Corrie King in the interm and at that visit his BP was 128/82  He notes no SE with the med changes  He does not check BP at home but has had a few other doc appts and readings usually 140's/ 80's  He was taking Mobic for a short time when his knee was bothering him (see below) but he is off that now  He denies regular OTC NSAID use  Pt did see [facility] (Dr Soni Hunt) in [facility] for L knee pain  He was told it could be a MCL tear and discussed steroid and brace vs MRI  He did an injection and wore the brace and his L knee improved and now his R knee is popping and clicking  BW results were d/w pt in detail: FLP with still some slight elevation of TC/LDL but improved from Oct, PSA/TSH wnl  He is down 5 lbs from April 2018        Review of Systems Constitutional: Negative for chills and fever  HENT: Negative for congestion and sinus pain  Eyes: Negative for pain and redness  Respiratory: Negative for cough, chest tightness and shortness of breath  Cardiovascular: Negative for chest pain, palpitations and leg swelling  Gastrointestinal: Negative for abdominal pain, constipation, diarrhea, nausea and vomiting  Endocrine: Negative for polydipsia and polyuria  Genitourinary: Negative for difficulty urinating and dysuria  Musculoskeletal: Positive for arthralgias  Negative for myalgias  Skin: Negative for rash and wound  Neurological: Negative for dizziness and headaches  Hematological: Does not bruise/bleed easily  Psychiatric/Behavioral: Negative for behavioral problems and confusion  Objective:    /90   Pulse 67   Temp 98 2 °F (36 8 °C)   Ht 5' 8" (1 727 m)   Wt 106 kg (233 lb)   BMI 35 43 kg/m²      Physical Exam   Constitutional: He appears well-developed and well-nourished  No distress  HENT:   Head: Normocephalic and atraumatic  Mouth/Throat: No oropharyngeal exudate  Eyes: Conjunctivae are normal  Right eye exhibits no discharge  Left eye exhibits no discharge  Neck: Neck supple  No tracheal deviation present  Cardiovascular: Normal rate and regular rhythm  No murmur heard  Pulmonary/Chest: Effort normal and breath sounds normal  No respiratory distress  He has no wheezes  He has no rales  Abdominal: Soft  He exhibits no distension  There is no tenderness  Musculoskeletal: He exhibits no edema  Neurological: He is alert  He exhibits normal muscle tone  Skin: Skin is warm and dry  No rash noted  Psychiatric: He has a normal mood and affect  His behavior is normal    Nursing note and vitals reviewed

## 2018-06-12 ENCOUNTER — TELEPHONE (OUTPATIENT)
Dept: FAMILY MEDICINE CLINIC | Facility: HOSPITAL | Age: 63
End: 2018-06-12

## 2018-06-12 NOTE — TELEPHONE ENCOUNTER
Wife wanted to know if it was ok if Cara Aguilar took 2 Amlodipine 5mg tabs once daily  to equal the 10 mg  This is so he can finish up his 5mg tabs before starting the 10mg tabs  Also checking to make sure he was to continue his HCTZ  I checked your last OV note and told her yes for both

## 2018-06-12 NOTE — TELEPHONE ENCOUNTER
Yes he is supposed to be on HCTZ as well as amlodipine, he can most certainly take two 5 mg tab to get rid of previous dose and then switch over to 10 mg 1 tab daily,  Call if any other problems/questions

## 2018-07-12 ENCOUNTER — OFFICE VISIT (OUTPATIENT)
Dept: FAMILY MEDICINE CLINIC | Facility: HOSPITAL | Age: 63
End: 2018-07-12
Payer: COMMERCIAL

## 2018-07-12 VITALS
TEMPERATURE: 97.7 F | WEIGHT: 233.2 LBS | DIASTOLIC BLOOD PRESSURE: 82 MMHG | SYSTOLIC BLOOD PRESSURE: 132 MMHG | HEIGHT: 69 IN | HEART RATE: 78 BPM | BODY MASS INDEX: 34.54 KG/M2

## 2018-07-12 DIAGNOSIS — R60.9 EDEMA, UNSPECIFIED TYPE: ICD-10-CM

## 2018-07-12 DIAGNOSIS — I10 ESSENTIAL HYPERTENSION: Primary | ICD-10-CM

## 2018-07-12 DIAGNOSIS — R21 RASH: ICD-10-CM

## 2018-07-12 PROCEDURE — 99214 OFFICE O/P EST MOD 30 MIN: CPT | Performed by: INTERNAL MEDICINE

## 2018-07-12 NOTE — PROGRESS NOTES
Assessment/Plan:    Hypertension  BP at goal with HCTZ and recent increase in amlodipine, d/w pt that edema may be d/t the higher dose but why did it not start till a month after med change? Rash is likely d/t the edema, d/w pt decrease in amlodipine back to 5 mg and adding 3rd agent - he is deferring at this time, encouraged elevation of legs and low sodium diet, pt does not use NSAIDs, call with new/worsening symptoms       Diagnoses and all orders for this visit:    Essential hypertension    Edema, unspecified type  Comments:  poss d/t amlodipine although did not occur till after med for a month, did start when doing alot of walking in [facility] in middle of heat wave when symptoms started - poss d/t that and high sodium diet while on vacay, will elevated LE and avoid high sodium foods, does not consume NSAIDs regularly, call with new/worsening symptoms, red flag HF symptoms reviewed and urged to call if they occur    Rash  Comments:  Resolving on its own, likely d/t associated edema, call with new/worsening symptoms          Subjective:      Patient ID: Matt Sawyer is a 57 y o  male  HPI Pt here for follow up appt    Last visit pts BP was not at goal and we subsequently increased his amlodipine to 10 mg 1 tab PO q day (pt was told to take 5 mg 2 tab PO q day to finish up his previous Rx)  BP at goal today and meds were reviewed and no changes have occurred  He denies missing doses of meds  He did note a rash above his socks and LE edema B/L just recently when he was in [facility]  He had been on the med for about a month prior to the rash/swelling appearing  He notes the rash and LE edema has improved but is still present LLE > RLE  He does not check his BP outside the office  He notes no frequent Ha's/dizziness/double vision/CP  [place] done Jan 2016    Fasting labs done May 2018    Review of Systems   Constitutional: Negative for chills, fatigue and fever     HENT: Negative for congestion and sinus pain     Eyes: Negative for pain and redness  Respiratory: Negative for cough, chest tightness and shortness of breath  Cardiovascular: Positive for leg swelling  Negative for chest pain and palpitations  Gastrointestinal: Negative for abdominal pain, constipation, diarrhea, nausea and vomiting  Endocrine: Negative for polydipsia and polyuria  Genitourinary: Negative for difficulty urinating and dysuria  Musculoskeletal: Negative for arthralgias, myalgias and neck pain  Skin: Positive for rash  Negative for wound  Neurological: Negative for dizziness and headaches  Hematological: Does not bruise/bleed easily  Psychiatric/Behavioral: Negative for behavioral problems and confusion  Objective:    /82   Pulse 78   Temp 97 7 °F (36 5 °C)   Ht 5' 8 5" (1 74 m)   Wt 106 kg (233 lb 3 2 oz)   BMI 34 94 kg/m²      Physical Exam   Constitutional: He appears well-developed and well-nourished  No distress  HENT:   Head: Normocephalic and atraumatic  Eyes: Conjunctivae are normal  Right eye exhibits no discharge  Left eye exhibits no discharge  Neck: Neck supple  No tracheal deviation present  Cardiovascular: Normal rate and regular rhythm  No murmur heard  Trace- +1 B/L LE edema   Pulmonary/Chest: Effort normal and breath sounds normal  No respiratory distress  He has no wheezes  He has no rales  Abdominal: Soft  He exhibits no distension  There is no tenderness  Musculoskeletal: He exhibits no deformity  Neurological: He is alert  He exhibits normal muscle tone  Skin: Skin is warm and dry  B/L LE with ill defined very faint erythematous macular pin point rash just above sock line, no papules/pustules/drainage/significant warmth noted   Psychiatric: He has a normal mood and affect  His behavior is normal    Nursing note and vitals reviewed

## 2018-07-12 NOTE — ASSESSMENT & PLAN NOTE
BP at goal with HCTZ and recent increase in amlodipine, d/w pt that edema may be d/t the higher dose but why did it not start till a month after med change?  Rash is likely d/t the edema, d/w pt decrease in amlodipine back to 5 mg and adding 3rd agent - he is deferring at this time, encouraged elevation of legs and low sodium diet, pt does not use NSAIDs, call with new/worsening symptoms

## 2018-07-17 ENCOUNTER — TELEPHONE (OUTPATIENT)
Dept: FAMILY MEDICINE CLINIC | Facility: HOSPITAL | Age: 63
End: 2018-07-17

## 2018-07-17 NOTE — TELEPHONE ENCOUNTER
Again d/w pt that the red blotches looked like stretching of skin from the edema - yes the edema could be from the amlodipine and I discussed switching this at last appt but pt deferred doing so - I would restart and con't the amlodipine unless the blotches become very dark red and get warm/hot and tender to touch which would indicate infection

## 2018-07-17 NOTE — TELEPHONE ENCOUNTER
Patient called back  Relayed to him Dr Calderon Found message  He did say that the spots were dark red and warm to touch  Requesting to come in for an office visit  Scheduled for tomorrow

## 2018-07-17 NOTE — TELEPHONE ENCOUNTER
Thinks he may be having a reaction to his blood pressure medication, amlodipine  Has red blotches on his legs again  Stopped taking it for now until he knows what to do

## 2018-07-18 ENCOUNTER — OFFICE VISIT (OUTPATIENT)
Dept: FAMILY MEDICINE CLINIC | Facility: HOSPITAL | Age: 63
End: 2018-07-18
Payer: COMMERCIAL

## 2018-07-18 VITALS
HEART RATE: 68 BPM | DIASTOLIC BLOOD PRESSURE: 82 MMHG | TEMPERATURE: 97 F | HEIGHT: 69 IN | SYSTOLIC BLOOD PRESSURE: 122 MMHG | WEIGHT: 235 LBS | BODY MASS INDEX: 34.8 KG/M2

## 2018-07-18 DIAGNOSIS — M79.89 SWELLING OF BOTH LOWER EXTREMITIES: ICD-10-CM

## 2018-07-18 DIAGNOSIS — R21 RASH: ICD-10-CM

## 2018-07-18 DIAGNOSIS — I10 ESSENTIAL HYPERTENSION: Primary | ICD-10-CM

## 2018-07-18 PROCEDURE — 99214 OFFICE O/P EST MOD 30 MIN: CPT | Performed by: NURSE PRACTITIONER

## 2018-07-18 RX ORDER — LOSARTAN POTASSIUM 25 MG/1
25 TABLET ORAL DAILY
Qty: 30 TABLET | Refills: 0 | Status: SHIPPED | OUTPATIENT
Start: 2018-07-18 | End: 2018-07-31 | Stop reason: SDUPTHER

## 2018-07-18 NOTE — PROGRESS NOTES
Assessment/Plan:     Rash likely due to swelling which is likely due to amlodipine increase  Doubtful cellulitis given bilaterality  Will decrease amlodipine back down to 5 mg (instructed to take 1/2 10 mg tab) and will add losartan  Instructed to f/u with SUSY Daniels in 2 weeks for BP check  Call with worsening redness, pain or swelling  Diagnoses and all orders for this visit:    Essential hypertension  -     losartan (COZAAR) 25 mg tablet; Take 1 tablet (25 mg total) by mouth daily    Rash    Swelling of both lower extremities          Subjective:     Patient ID: Geoff Grover is a 57 y o  male  Has rash on both lower legs  Started 3 weeks ago in [place]  Did go away completely  Occurred again 5 days ago when in the heat  Seems to get worse in the heat  Yesterday rash and swelling were both worse  Not itchy  Tender to touch  Did see Dr Jose Leigh 5 days ago with faint rash  1 month ago amlodipine was increased to 10 mg  Swelling and rash started after that  No fever,chills, ill feeling  Rash is contained to lower legs  Review of Systems   Constitutional: Negative for chills and fever  Cardiovascular: Positive for leg swelling  Skin: Positive for rash  The following portions of the patient's history were reviewed and updated as appropriate: allergies, current medications, past family history, past medical history, past social history, past surgical history and problem list     Objective:  Vitals:    07/18/18 0847   BP: 122/82   Pulse: 68   Temp: (!) 97 °F (36 1 °C)      Physical Exam   Constitutional: He is oriented to person, place, and time  He appears well-developed and well-nourished  Cardiovascular: Normal rate, regular rhythm and normal heart sounds  No murmur heard  +1 pitting edema noted left LE  Trace pitting noted RLE  Pulmonary/Chest: Effort normal and breath sounds normal    Neurological: He is alert and oriented to person, place, and time     Skin: Skin is warm and dry  Purple red vasculitic rash noted B/L LE  Some mild erythema  Not warm to touch  Slightly tender  Psychiatric: He has a normal mood and affect

## 2018-07-31 ENCOUNTER — OFFICE VISIT (OUTPATIENT)
Dept: FAMILY MEDICINE CLINIC | Facility: HOSPITAL | Age: 63
End: 2018-07-31
Payer: COMMERCIAL

## 2018-07-31 VITALS
DIASTOLIC BLOOD PRESSURE: 82 MMHG | BODY MASS INDEX: 35.46 KG/M2 | HEART RATE: 68 BPM | TEMPERATURE: 97.9 F | HEIGHT: 68 IN | WEIGHT: 234 LBS | SYSTOLIC BLOOD PRESSURE: 132 MMHG

## 2018-07-31 DIAGNOSIS — R29.898 WEAKNESS OF BOTH LOWER EXTREMITIES: ICD-10-CM

## 2018-07-31 DIAGNOSIS — I10 ESSENTIAL HYPERTENSION: Primary | ICD-10-CM

## 2018-07-31 PROCEDURE — 99214 OFFICE O/P EST MOD 30 MIN: CPT | Performed by: INTERNAL MEDICINE

## 2018-07-31 PROCEDURE — 3008F BODY MASS INDEX DOCD: CPT | Performed by: INTERNAL MEDICINE

## 2018-07-31 PROCEDURE — 3075F SYST BP GE 130 - 139MM HG: CPT | Performed by: INTERNAL MEDICINE

## 2018-07-31 PROCEDURE — 3079F DIAST BP 80-89 MM HG: CPT | Performed by: INTERNAL MEDICINE

## 2018-07-31 RX ORDER — LOSARTAN POTASSIUM 25 MG/1
25 TABLET ORAL DAILY
Qty: 90 TABLET | Refills: 0 | Status: SHIPPED | OUTPATIENT
Start: 2018-07-31 | End: 2018-11-12 | Stop reason: SDUPTHER

## 2018-07-31 RX ORDER — HYDROCHLOROTHIAZIDE 25 MG/1
25 TABLET ORAL DAILY
Qty: 90 TABLET | Refills: 3 | Status: SHIPPED | OUTPATIENT
Start: 2018-07-31 | End: 2019-03-18 | Stop reason: SDUPTHER

## 2018-07-31 RX ORDER — AMLODIPINE BESYLATE 5 MG/1
5 TABLET ORAL DAILY
Qty: 30 TABLET | Refills: 0
Start: 2018-07-31 | End: 2018-07-31 | Stop reason: SDUPTHER

## 2018-07-31 RX ORDER — AMLODIPINE BESYLATE 5 MG/1
5 TABLET ORAL DAILY
Qty: 90 TABLET | Refills: 3
Start: 2018-07-31 | End: 2018-08-01 | Stop reason: SDUPTHER

## 2018-07-31 NOTE — PROGRESS NOTES
Assessment/Plan:    Hypertension  Bp better with recent med changes - con't current regimen, recheck in 3 mos and if stable go to q 6 mos, call with HA/dizziness/double vision/CP       Diagnoses and all orders for this visit:    Essential hypertension  -     Discontinue: amLODIPine (NORVASC) 5 mg tablet; Take 1 tablet (5 mg total) by mouth daily for 30 days  -     amLODIPine (NORVASC) 5 mg tablet; Take 1 tablet (5 mg total) by mouth daily for 90 days  -     hydrochlorothiazide (HYDRODIURIL) 25 mg tablet; Take 1 tablet (25 mg total) by mouth daily for 90 days  -     losartan (COZAAR) 25 mg tablet; Take 1 tablet (25 mg total) by mouth daily for 90 days    Weakness of both lower extremities  Comments:  Reassured pt that symptom is subjective and reassured exam was normal, monitor closely, BW/TSh nml 5/18, call with new/worse symptoms/falls/rash       [place] 2016 - good for 10 yrs    BW May 2018    Subjective:      Patient ID: Irene Huggins is a 57 y o  male  HPI Pt here for BP check  He was seen by Cindy Anne since our last visit for con't LE edema and rash  Thought to again be d/t increase in his Amlodipine and venous stasis  Pt had his Amlodipine decreased back to 5 mg and Losartan 25 mg 1 tab PO q day was added  He states his swelling and rash resolved in about a week after the Amlodipine was decreased  BP elevated again today  He admits he was rushing to the appt  He does not use NSAIDs regularly and when asked about adding salt to his food he states "very little"  He does not check his BP at home at all  He notes no HA's/dizziness/double vision/CP  He does feel weak in the legs B/L  He notes they hurt at the end of the day after he is on them a lot  He notes no redness/warmth/swelling to joints  He notes no recent falls  He notes no back pain/numbness/tingling  LEAD nml in Feb 2016  [place] 2016 - good for 10 yrs    BW 5/18    Review of Systems   Constitutional: Negative for chills and fever  HENT: Negative for congestion and sinus pain  Eyes: Negative for pain and redness  Respiratory: Negative for cough, chest tightness and shortness of breath  Cardiovascular: Positive for leg swelling  Negative for chest pain and palpitations  Gastrointestinal: Negative for abdominal pain, diarrhea, nausea and vomiting  Musculoskeletal: Negative for arthralgias and myalgias  Skin: Negative for rash and wound  Neurological: Positive for weakness  Negative for dizziness, numbness and headaches  Hematological: Does not bruise/bleed easily  Psychiatric/Behavioral: Negative for behavioral problems and confusion  Objective:    /82   Pulse 68   Temp 97 9 °F (36 6 °C)   Ht 5' 8" (1 727 m)   Wt 106 kg (234 lb)   BMI 35 58 kg/m²      Physical Exam   Constitutional: He appears well-developed and well-nourished  No distress  HENT:   Head: Normocephalic and atraumatic  Eyes: Conjunctivae are normal  Right eye exhibits no discharge  Left eye exhibits no discharge  Neck: Neck supple  No tracheal deviation present  Cardiovascular: Normal rate and regular rhythm  No murmur heard  Trace L>R LE edema   Pulmonary/Chest: Effort normal and breath sounds normal  No respiratory distress  He has no wheezes  He has no rales  Musculoskeletal: He exhibits no deformity  5/5 B/L LE muscle strength, nml heel and toe walking w/o weakness   Neurological: He is alert  He exhibits normal muscle tone  Skin: Skin is warm and dry  No rash noted  Psychiatric: He has a normal mood and affect  His behavior is normal    Nursing note and vitals reviewed

## 2018-07-31 NOTE — ASSESSMENT & PLAN NOTE
Bp better with recent med changes - con't current regimen, recheck in 3 mos and if stable go to q 6 mos, call with HA/dizziness/double vision/CP

## 2018-08-01 DIAGNOSIS — I10 ESSENTIAL HYPERTENSION: ICD-10-CM

## 2018-08-01 RX ORDER — AMLODIPINE BESYLATE 5 MG/1
5 TABLET ORAL DAILY
Qty: 90 TABLET | Refills: 3
Start: 2018-08-01 | End: 2018-08-03 | Stop reason: SDUPTHER

## 2018-08-03 DIAGNOSIS — I10 ESSENTIAL HYPERTENSION: ICD-10-CM

## 2018-08-05 RX ORDER — AMLODIPINE BESYLATE 5 MG/1
5 TABLET ORAL DAILY
Qty: 90 TABLET | Refills: 0
Start: 2018-08-05 | End: 2018-10-11 | Stop reason: SDUPTHER

## 2018-10-11 DIAGNOSIS — I10 ESSENTIAL HYPERTENSION: ICD-10-CM

## 2018-10-11 RX ORDER — AMLODIPINE BESYLATE 5 MG/1
5 TABLET ORAL DAILY
Qty: 90 TABLET | Refills: 0 | Status: SHIPPED | OUTPATIENT
Start: 2018-10-11 | End: 2018-10-12 | Stop reason: SDUPTHER

## 2018-10-12 ENCOUNTER — OFFICE VISIT (OUTPATIENT)
Dept: FAMILY MEDICINE CLINIC | Facility: HOSPITAL | Age: 63
End: 2018-10-12
Payer: COMMERCIAL

## 2018-10-12 VITALS
DIASTOLIC BLOOD PRESSURE: 82 MMHG | SYSTOLIC BLOOD PRESSURE: 120 MMHG | WEIGHT: 232 LBS | TEMPERATURE: 97.8 F | HEART RATE: 78 BPM | BODY MASS INDEX: 35.16 KG/M2 | HEIGHT: 68 IN

## 2018-10-12 DIAGNOSIS — I10 ESSENTIAL HYPERTENSION: Primary | ICD-10-CM

## 2018-10-12 DIAGNOSIS — M25.562 LEFT KNEE PAIN, UNSPECIFIED CHRONICITY: ICD-10-CM

## 2018-10-12 PROCEDURE — 99214 OFFICE O/P EST MOD 30 MIN: CPT | Performed by: INTERNAL MEDICINE

## 2018-10-12 RX ORDER — AMLODIPINE BESYLATE 5 MG/1
5 TABLET ORAL DAILY
Qty: 90 TABLET | Refills: 0
Start: 2018-10-12 | End: 2019-04-12

## 2018-10-24 ENCOUNTER — HOSPITAL ENCOUNTER (OUTPATIENT)
Dept: MRI IMAGING | Facility: HOSPITAL | Age: 63
Discharge: HOME | End: 2018-10-24
Payer: COMMERCIAL

## 2018-10-24 ENCOUNTER — APPOINTMENT (OUTPATIENT)
Dept: LAB | Facility: HOSPITAL | Age: 63
End: 2018-10-24
Attending: INTERNAL MEDICINE
Payer: COMMERCIAL

## 2018-10-24 DIAGNOSIS — C85.91 NON-HODGKIN LYMPHOMA, UNSPECIFIED, LYMPH NODES OF HEAD, FACE, AND NECK (HCC): ICD-10-CM

## 2018-10-24 DIAGNOSIS — M25.562 LEFT KNEE PAIN, UNSPECIFIED CHRONICITY: ICD-10-CM

## 2018-10-24 DIAGNOSIS — S83.282A TEAR OF LATERAL MENISCUS OF LEFT KNEE, CURRENT, UNSPECIFIED TEAR TYPE, INITIAL ENCOUNTER: Primary | ICD-10-CM

## 2018-10-24 LAB
ALBUMIN SERPL BCP-MCNC: 4.1 G/DL (ref 3.5–5)
ALP SERPL-CCNC: 47 U/L (ref 46–116)
ALT SERPL W P-5'-P-CCNC: 27 U/L (ref 12–78)
ANION GAP SERPL CALCULATED.3IONS-SCNC: 8 MMOL/L (ref 4–13)
AST SERPL W P-5'-P-CCNC: 21 U/L (ref 5–45)
BASOPHILS # BLD AUTO: 0.03 THOUSANDS/ΜL (ref 0–0.1)
BASOPHILS NFR BLD AUTO: 1 % (ref 0–1)
BILIRUB SERPL-MCNC: 0.5 MG/DL (ref 0.2–1)
BUN SERPL-MCNC: 22 MG/DL (ref 5–25)
CALCIUM SERPL-MCNC: 9 MG/DL (ref 8.3–10.1)
CHLORIDE SERPL-SCNC: 103 MMOL/L (ref 100–108)
CO2 SERPL-SCNC: 29 MMOL/L (ref 21–32)
CREAT SERPL-MCNC: 1.02 MG/DL (ref 0.6–1.3)
EOSINOPHIL # BLD AUTO: 0.09 THOUSAND/ΜL (ref 0–0.61)
EOSINOPHIL NFR BLD AUTO: 2 % (ref 0–6)
ERYTHROCYTE [DISTWIDTH] IN BLOOD BY AUTOMATED COUNT: 12.9 % (ref 11.6–15.1)
GFR SERPL CREATININE-BSD FRML MDRD: 77 ML/MIN/1.73SQ M
GLUCOSE SERPL-MCNC: 80 MG/DL (ref 65–140)
HCT VFR BLD AUTO: 48.4 % (ref 36.5–49.3)
HGB BLD-MCNC: 16.5 G/DL (ref 12–17)
IMM GRANULOCYTES # BLD AUTO: 0.02 THOUSAND/UL (ref 0–0.2)
IMM GRANULOCYTES NFR BLD AUTO: 0 % (ref 0–2)
LDH SERPL-CCNC: 224 U/L (ref 81–234)
LYMPHOCYTES # BLD AUTO: 1.81 THOUSANDS/ΜL (ref 0.6–4.47)
LYMPHOCYTES NFR BLD AUTO: 30 % (ref 14–44)
MCH RBC QN AUTO: 30.1 PG (ref 26.8–34.3)
MCHC RBC AUTO-ENTMCNC: 34.1 G/DL (ref 31.4–37.4)
MCV RBC AUTO: 88 FL (ref 82–98)
MONOCYTES # BLD AUTO: 0.48 THOUSAND/ΜL (ref 0.17–1.22)
MONOCYTES NFR BLD AUTO: 8 % (ref 4–12)
NEUTROPHILS # BLD AUTO: 3.59 THOUSANDS/ΜL (ref 1.85–7.62)
NEUTS SEG NFR BLD AUTO: 59 % (ref 43–75)
NRBC BLD AUTO-RTO: 0 /100 WBCS
PLATELET # BLD AUTO: 184 THOUSANDS/UL (ref 149–390)
PMV BLD AUTO: 10.6 FL (ref 8.9–12.7)
POTASSIUM SERPL-SCNC: 3.8 MMOL/L (ref 3.5–5.3)
PROT SERPL-MCNC: 7.2 G/DL (ref 6.4–8.2)
RBC # BLD AUTO: 5.49 MILLION/UL (ref 3.88–5.62)
SODIUM SERPL-SCNC: 140 MMOL/L (ref 136–145)
WBC # BLD AUTO: 6.02 THOUSAND/UL (ref 4.31–10.16)

## 2018-10-24 PROCEDURE — 36415 COLL VENOUS BLD VENIPUNCTURE: CPT

## 2018-10-24 PROCEDURE — 73721 MRI JNT OF LWR EXTRE W/O DYE: CPT

## 2018-10-24 PROCEDURE — 80053 COMPREHEN METABOLIC PANEL: CPT

## 2018-10-24 PROCEDURE — 85025 COMPLETE CBC W/AUTO DIFF WBC: CPT

## 2018-10-24 PROCEDURE — 83615 LACTATE (LD) (LDH) ENZYME: CPT

## 2018-10-26 DIAGNOSIS — C85.91 NON-HODGKIN LYMPHOMA, UNSPECIFIED, LYMPH NODES OF HEAD, FACE, AND NECK (HCC): ICD-10-CM

## 2018-10-29 ENCOUNTER — OFFICE VISIT (OUTPATIENT)
Dept: HEMATOLOGY ONCOLOGY | Facility: HOSPITAL | Age: 63
End: 2018-10-29
Payer: COMMERCIAL

## 2018-10-29 VITALS
TEMPERATURE: 97.2 F | HEART RATE: 98 BPM | SYSTOLIC BLOOD PRESSURE: 118 MMHG | OXYGEN SATURATION: 98 % | WEIGHT: 234 LBS | DIASTOLIC BLOOD PRESSURE: 78 MMHG | RESPIRATION RATE: 16 BRPM | HEIGHT: 68 IN | BODY MASS INDEX: 35.46 KG/M2

## 2018-10-29 DIAGNOSIS — C85.91 LYMPHOMA, HEAD, FACE, OR NECK: Primary | ICD-10-CM

## 2018-10-29 PROBLEM — S83.282A ACUTE LATERAL MENISCUS TEAR OF LEFT KNEE: Status: ACTIVE | Noted: 2018-10-29

## 2018-10-29 PROBLEM — M17.0 PRIMARY OSTEOARTHRITIS OF BOTH KNEES: Status: ACTIVE | Noted: 2018-10-29

## 2018-10-29 PROCEDURE — 99214 OFFICE O/P EST MOD 30 MIN: CPT | Performed by: PHYSICIAN ASSISTANT

## 2018-10-29 NOTE — PROGRESS NOTES
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    HPI:  Shanice Farris is a pleasant 70year old   male, who presents to the office today unaccompanied for today's visit  He notes his disease was discovered by his dentist Dr Krystina Dominguez, at a routine dental exam   He had a biopsy, states surgery by oral surgeon  He then had 6 cycles of R-CHOP  He concluded treatment on 12/5/11  Radiation was administered by Dr My Drew, and he states it concluded 2/29/12  Previous Hematologic/ Oncologic History:    Biopsy, followed by R-CHOP chemo x 6 cycles, ending 12/5/11  Dr My Drew administered XRT, ending 2/29/12  Current Hematologic/ Oncologic Treatment:    Observation    Interval History:    Since last visit with Dr Ruchi Hanks on 10/30/17, he reports he has been doing well from Lymphoma standpoint  He reports being asymptomatic, and denies night sweats or weight loss  His only complaint is regarding his Left knee  He notes that he had seen PCP Dr Narayan Tidwell and had recent X ray on 5/3/18, with probable small joint effusion noted  There was no lytic or blastic lesions, and no acute osseous abnormality  He had MRI L knee on 10/24/18, with Lateral meniscus tear noted  He is scheduled for referral to ortho, and has appointment with Dr Carmela Quinn tomorrow  Otherwise, he has no complaints  He had last colonoscopy on 1/18/16, and will repeat in 10 years  His PCP screens him for Prostate Cancer with PSA  It was performed on 5/29/18, and within normal limits at 0 4  He already had a flu shot this year  Test Results:  Imaging: Mri Knee Left  Wo Contrast    Result Date: 10/24/2018  Narrative: MRI LEFT KNEE INDICATION:   M25 562: Pain in left knee   COMPARISON: Plain film dated 5/3/2018 TECHNIQUE:    MR sequences were obtained of the left knee including:  Localizer, axial T2 fat sat, coronal T1/T2 fat sat, sagittal PD/T2 fat sat  Images were acquired on a 1 5 Larisa unit  Gadolinium was not used  FINDINGS: SUBCUTANEOUS TISSUES: Normal JOINT EFFUSION: There is a moderate joint effusion  BAKER'S CYST: None  MENISCI: Medial meniscus remains intact  Superior surface tear involving the body and anterior horn of the lateral meniscus exhibiting mild extrusion without flipped fragment  CRUCIATE LIGAMENTS: Intact  EXTENSOR APPARATUS: Intact  COLLATERAL LIGAMENTS: Intact  ARTICULAR SURFACES: Tricompartmental degenerative change with full-thickness cartilage loss at the weightbearing aspect of the lateral femoral condyle measuring up to 10 x 7 mm  Full-thickness cartilage loss at the mid trochlea as well  BONES: Normal  MUSCULATURE:  Intact  Impression: 1  Superior surface lateral meniscus tear without flipped fragment as above  Medial meniscus remains intact  2   Degenerative change including full-thickness cartilage loss at the weightbearing aspect lateral femoral condyle as well as the mid trochlea as above  Associated joint effusion  Workstation performed: [id number]     Labs:   Lab Results   Component Value Date    WBC 6 02 10/24/2018    HGB 16 5 10/24/2018    HCT 48 4 10/24/2018    MCV 88 10/24/2018     10/24/2018   STABLE  No notable abnormality  Lab Results   Component Value Date     10/24/2018    K 3 8 10/24/2018     10/24/2018    CO2 29 10/24/2018    ANIONGAP 9 09/18/2015    BUN 22 10/24/2018    CREATININE 1 02 10/24/2018    GLUCOSE 148 (H) 09/18/2015    GLUF 97 10/26/2017    CALCIUM 9 0 10/24/2018    AST 21 10/24/2018    ALT 27 10/24/2018    ALKPHOS 47 10/24/2018    PROT 7 3 09/18/2015    BILITOT 0 66 09/18/2015    EGFR 77 10/24/2018   Blood sugar was 80 on 10/24/18         Lab Results   Component Value Date    PSA 0 4 05/29/2018    PSA 0 4 02/16/2017    PSA 0 4 01/20/2016   LDH stable at 224 on 10/24/18, prior on 10/26/17 = 179, and on 9/28/16 was mildly elevated to 239  Review of Systems   Constitutional: Negative for activity change, appetite change, fatigue and fever  HENT: Negative for nosebleeds, sore throat and trouble swallowing  Eyes: Negative for visual disturbance  Respiratory: Negative for cough, chest tightness, shortness of breath and wheezing  Cardiovascular: Positive for leg swelling  Negative for chest pain and palpitations  Gastrointestinal: Negative for abdominal distention, abdominal pain, blood in stool, constipation, diarrhea, nausea and vomiting  Genitourinary: Negative for difficulty urinating, dysuria, hematuria, penile pain and urgency  Musculoskeletal: Positive for arthralgias (B/L knees  MRI of L knee on 10/24/18 with L lateral meniscus tear noted  Ortho tomorrow,)  Negative for back pain and myalgias  Skin: Negative for pallor and rash  Neurological: Negative for dizziness, weakness, numbness and headaches  Hematological: Negative for adenopathy  Does not bruise/bleed easily  Psychiatric/Behavioral: Negative for confusion and sleep disturbance  The patient is not nervous/anxious        Active Problems:   Patient Active Problem List   Diagnosis    Apnea    Dyslipidemia    Hypertension    Intermittent claudication ([address] )    Lymphoma, head, face, or neck ([address] )    Macular degeneration    Obesity    Serrated adenoma of colon    Umbilical hernia     Past Medical History:   Past Medical History:   Diagnosis Date    Hypertension 2016     Surgical History:   Past Surgical History:   Procedure Laterality Date    COLONOSCOPY  01/18/2016    complete - serrted adenoma    DENTAL SURGERY  2017    Root canals due to cancer    INCISION AND DRAINAGE ABSCESS / HEMATOMA OF BURSA / KNEE / THIGH  02/09/2015    rigjt upper, inner arm abscess    PORTACATH PLACEMENT  2011    And removed      TONSILLECTOMY      As kid    VASECTOMY  1978     Family History:    Family History   Problem Relation Age of Onset    Atrial fibrillation Mother     COPD Father     Rheumatic fever Father     Glaucoma Father     Thyroid disease Sister     Cancer Brother     Hypertension Son     Cancer Brother     Diverticulitis Son      Cancer-related family history includes Cancer in his brother and brother  Social History:   Social History     Social History    Marital status: /Civil Union     Spouse name: N/A    Number of children: N/A    Years of education: N/A     Occupational History          full time employment      Social History Main Topics    Smoking status: Never Smoker    Smokeless tobacco: Never Used    Alcohol use Yes      Comment: rare alcohol use - socially    Drug use: No    Sexual activity: Not on file      Comment: Resides with Edna (Wife)     Other Topics Concern    Not on file     Social History Narrative             Current Medications:   Current Outpatient Prescriptions   Medication Sig Dispense Refill    amLODIPine (NORVASC) 5 mg tablet Take 1 tablet (5 mg total) by mouth daily for 90 days 90 tablet 0    hydrochlorothiazide (HYDRODIURIL) 25 mg tablet Take 1 tablet (25 mg total) by mouth daily for 90 days 90 tablet 3    losartan (COZAAR) 25 mg tablet Take 1 tablet (25 mg total) by mouth daily for 90 days 90 tablet 0     No current facility-administered medications for this visit  Allergies: No Known Allergies    Physical Exam:  Physical Exam   Constitutional: He is oriented to person, place, and time  He appears well-developed and well-nourished  No distress  HENT:   Head: Normocephalic and atraumatic  Mouth/Throat: Oropharynx is clear and moist  No oropharyngeal exudate  Eyes: Conjunctivae are normal  No scleral icterus  Neck: Neck supple  Cardiovascular: Normal rate and regular rhythm  Exam reveals no gallop and no friction rub  No murmur heard    Pulmonary/Chest: Effort normal and breath sounds normal  No respiratory distress  He has no wheezes  He has no rales  Abdominal: Soft  Bowel sounds are normal  He exhibits no distension and no mass  There is no tenderness  There is no rebound and no guarding  Umbilical hernia   Musculoskeletal: Normal range of motion  He exhibits edema (Trace B/L ankle edema)  Left knee pain  Lymphadenopathy:     He has no cervical adenopathy  Neurological: He is alert and oriented to person, place, and time  Skin: Skin is warm and dry  No rash noted  He is not diaphoretic  No pallor  Psychiatric: He has a normal mood and affect  His behavior is normal    Vitals reviewed  Vitals:    10/29/18 0800   BP: 118/78   Pulse: 98   Resp: 16   Temp: (!) 97 2 °F (36 2 °C)   SpO2: 98%   Stable vitals  Assessment / Plan:    1  Diffuse Large B Cell Lymphoma, head, face, or neck ([facility] [address] )  Patient is asymptomatic at this time  We reviewed blood work from 10/24/18, which is stable  LDH is WNL at 224, and had been as high as 239 on 9/28/16  He had no palpable lymphadenopathy on exam, and no notable abnormality on oral inspection  His PSA is stable, and he is up to date on Colonoscopy  He is aware to contact our office with any questions or concerns  We will have him RTO in 1 year with Dr Isabela Ramirez  - CBC and differential; Future  - Comprehensive metabolic panel; Future  - LD (LDH), Body Fluid; Future      ECOG PS 0-1 due to knee  Goals and Barriers:  Current Goal:  Prolong Survival from Lymphoma   Barriers: None  Patient's Capacity to Self Care:  Patient is able to self care  Total time spent with patient was 25 minutes, of which >50% of the time was spent in direct consultation discussing issue with knee, reviewing blood work, upcoming follow up       Portions of the record may have been created with voice recognition software   Occasional wrong word or "sound a like" substitutions may have occurred due to the inherent limitations of voice recognition software   Read the chart carefully and recognize, using context, where substitutions have occurred

## 2018-10-30 ENCOUNTER — OFFICE VISIT (OUTPATIENT)
Dept: OBGYN CLINIC | Facility: CLINIC | Age: 63
End: 2018-10-30
Payer: COMMERCIAL

## 2018-10-30 VITALS
SYSTOLIC BLOOD PRESSURE: 129 MMHG | HEIGHT: 68 IN | HEART RATE: 88 BPM | WEIGHT: 236 LBS | DIASTOLIC BLOOD PRESSURE: 75 MMHG | BODY MASS INDEX: 35.77 KG/M2

## 2018-10-30 DIAGNOSIS — S83.282A TEAR OF LATERAL MENISCUS OF LEFT KNEE, CURRENT, UNSPECIFIED TEAR TYPE, INITIAL ENCOUNTER: ICD-10-CM

## 2018-10-30 PROCEDURE — 99203 OFFICE O/P NEW LOW 30 MIN: CPT | Performed by: ORTHOPAEDIC SURGERY

## 2018-10-30 RX ORDER — CHLORHEXIDINE GLUCONATE 4 G/100ML
SOLUTION TOPICAL DAILY PRN
Status: CANCELLED | OUTPATIENT
Start: 2018-11-19

## 2018-10-30 RX ORDER — SODIUM CHLORIDE, SODIUM LACTATE, POTASSIUM CHLORIDE, CALCIUM CHLORIDE 600; 310; 30; 20 MG/100ML; MG/100ML; MG/100ML; MG/100ML
125 INJECTION, SOLUTION INTRAVENOUS CONTINUOUS
Status: CANCELLED | OUTPATIENT
Start: 2018-11-19

## 2018-10-30 NOTE — PROGRESS NOTES
Assessment:     1  Tear of lateral meniscus of left knee, current, unspecified tear type, initial encounter          Plan:     Problem List Items Addressed This Visit        Musculoskeletal and Integument    Tear of lateral meniscus of left knee, current     29-year-old male with left knee lateral meniscus tear that underlying chondral injury  I discussed the diagnosis and treatment options with him  Given the fact that he failed to have any long-term relief with the cortisone injection, and based on his physical examination and symptoms, I think he would have significant relief with surgery  We discussed that he may not have 100% pain relief, but no likely look good he should have 70-80% improvement in his symptoms  Should be able to get back to doing a lot of his activities with significant improvement  Risks and benefits of surgery were discussed with him and informed consent was obtained  Patient's preoperative paperwork was filled out today  He should have medical clearance preoperatively  Relevant Orders    Case request operating room: ARTHROSCOPY KNEE PARTIAL  LATERAL MENISECTOMY (Completed)    Basic metabolic panel    CBC and differential    Ambulatory referral to Internal Medicine    EKG 12 lead           Patient ID: Leisa Uriarte is a 70 y o  male  Chief Complaint:  Left knee pain    HPI:  29-year-old male with pain in his left knee  This has been going on since July  He does not recall a very specific event, but he was at [facility] for a while and recalls standing on his knee and getting his knee twisted when he was putting gas in his car  Since that time he has had significant swelling and pain in the knee  He was seen an outside orthopedist and was given a cortisone injection which gave him approximately 2-3 weeks of significant pain relief, then it wore off  Since that time he has been having a lot of problems    He is able to walk, but any bending with weight on the knee is almost unbearable  He enjoys fishing and is having a lot of trouble getting down in [place]  Going from straightening to bending the knee is very painful for him even when he is lying down  He denies any catching or locking  He has not been taking any medication for it  He ices occasionally  The patient is wanting to know what can be done with this so he can get back to his normal activities  Patient's medical intake was reviewed  Allergy:  No Known Allergies    Medications:  all current active meds have been reviewed    Past Medical History:  Past Medical History:   Diagnosis Date    Hypertension 2016    Lymphoma ([address] )     in remission       Past Surgical History:  Past Surgical History:   Procedure Laterality Date    COLONOSCOPY  01/18/2016    complete - serrted adenoma    DENTAL SURGERY  2017    Root canals due to cancer    INCISION AND DRAINAGE ABSCESS / HEMATOMA OF Juluis Curly / KNEE / THIGH  02/09/2015    rigjt upper, inner arm abscess    PORTACATH PLACEMENT  2011    And removed      TONSILLECTOMY      As kid    VASECTOMY  1978       Family History:  Family History   Problem Relation Age of Onset    Atrial fibrillation Mother     COPD Father     Rheumatic fever Father     Glaucoma Father     Thyroid disease Sister     Cancer Brother     Hypertension Son     Cancer Brother     Diverticulitis Son        Social History:  History   Alcohol Use    Yes     Comment: rare alcohol use - socially     History   Drug Use No     History   Smoking Status    Never Smoker   Smokeless Tobacco    Never Used           ROS:  Review of Systems   Constitutional: Negative  HENT: Negative  Eyes: Negative  Respiratory: Negative  Cardiovascular: Negative  Gastrointestinal: Negative  Endocrine: Negative  Genitourinary: Negative  Musculoskeletal: Positive for arthralgias and joint swelling  Skin: Negative  Allergic/Immunologic: Negative  Neurological: Negative  Hematological: Negative  Psychiatric/Behavioral: Negative  Objective:  BP Readings from Last 1 Encounters:   10/30/18 129/75      Wt Readings from Last 1 Encounters:   10/30/18 107 kg (236 lb)        BMI:   Estimated body mass index is 35 88 kg/m² as calculated from the following:    Height as of this encounter: 5' 8" (1 727 m)  Weight as of this encounter: 107 kg (236 lb)  EXAM:   Physical Exam   Constitutional: He is oriented to person, place, and time  He appears well-developed and well-nourished  No distress  HENT:   Head: Normocephalic and atraumatic  Eyes: Right eye exhibits no discharge  Left eye exhibits no discharge  Neck: Normal range of motion  Neck supple  No tracheal deviation present  Cardiovascular: Normal rate, regular rhythm and normal heart sounds  Pulmonary/Chest: Effort normal and breath sounds normal  No respiratory distress  Abdominal: Soft  He exhibits no distension  There is no tenderness  Musculoskeletal:        Right knee: He exhibits no effusion  Left knee: He exhibits no effusion  Neurological: He is alert and oriented to person, place, and time  Skin: Skin is warm  He is not diaphoretic  No erythema  Psychiatric: He has a normal mood and affect  His behavior is normal      Right Knee Exam   Right knee exam is normal     Tenderness   The patient is experiencing no tenderness  Range of Motion   The patient has normal right knee ROM  Muscle Strength     The patient has normal right knee strength      Tests   Maryanne:  Medial - negative Lateral - negative  Lachman:  Anterior - negative      Drawer:       Posterior - negative  Varus: negative  Valgus: negative  Pivot Shift: negative  Patellar Apprehension: negative    Other   Erythema: absent  Sensation: normal  Pulse: present  Swelling: none  Other tests: no effusion present      Left Knee Exam     Tenderness   The patient is experiencing tenderness in the medial joint line and lateral joint line  Range of Motion   Extension: normal   Flexion: 120     Tests   Maryanne:  Medial - negative Lateral - positive  Lachman:  Anterior - negative      Drawer:       Posterior - negative  Varus: negative    Pivot Shift: negative  Patellar Apprehension: negative    Other   Sensation: normal  Pulse: present  Swelling: none  Effusion: no effusion present    Comments:  Patient with mild quadriceps atrophy, very mild crepitus on knee range of motion              Radiographs:  I have personally reviewed pertinent films in PACS and my interpretation is X-rays of the left knee are reviewed  There is some very mild osteoarthritis noted on x-rays  MRI is reviewed which shows a significant lateral meniscus tear, complex of the posterior half  There is a chondral lesion noted on the posterior aspect of the lateral femoral condyle  ACL, mcl, PCL, ACL, and medial meniscus are intact

## 2018-10-30 NOTE — ASSESSMENT & PLAN NOTE
68-year-old male with left knee lateral meniscus tear that underlying chondral injury  I discussed the diagnosis and treatment options with him  Given the fact that he failed to have any long-term relief with the cortisone injection, and based on his physical examination and symptoms, I think he would have significant relief with surgery  We discussed that he may not have 100% pain relief, but no likely look good he should have 70-80% improvement in his symptoms  Should be able to get back to doing a lot of his activities with significant improvement  Risks and benefits of surgery were discussed with him and informed consent was obtained  Patient's preoperative paperwork was filled out today  He should have medical clearance preoperatively

## 2018-11-08 NOTE — PRE-PROCEDURE INSTRUCTIONS
Pre-Surgery Instructions:   Medication Instructions    amLODIPine (NORVASC) 5 mg tablet Instructed patient per Anesthesia Guidelines   hydrochlorothiazide (HYDRODIURIL) 25 mg tablet Instructed patient per Anesthesia Guidelines   losartan (COZAAR) 25 mg tablet Instructed patient per Anesthesia Guidelines  Before your operation, you play an important role in decreasing your risk for infection by washing with special antiseptic soap  This is an effective way to reduce bacteria on the skin which may help to prevent infections at the surgical site  Please read the following directions in advance  1  In the week before your operation purchase a 4 ounce bottle of antiseptic soap containing chlorhexidine gluconate 4%  Some brand names include: Aplicare, Endure, and Hibiclens  The cost is usually less than $5 00  · For your convenience, the [address] [place] [address] carries the soap  · It may also be available at your doctor's office or pre-admission testing center, and at most retail pharmacies  · If you are allergic or sensitive to soaps containing chlorhexidine gluconate (CHG), please let your doctor know so another antiseptic soap can be suggested  · CHG antiseptic soap is for external use only  2      The day before your operation follow these directions carefully to get ready  · Place clean lines (sheets) on your bed; you should sleep on clean sheets after your evening shower  · Get clean towels and washcloths ready - you need enough for 2 showers  · Set aside clean underwear, pajamas, and clothing to wear after the shower  Reminders:  · DO NOT use any other soap or body rinse on your skin during or after the antiseptic showers  · DO NOT use lotion , powder, deodorant, or perfume/aftershave of any kind on your skin after your antiseptic shower  · DO NOT shave any body parts in the 24 hours/the day before your operation    · DO NOT get the antiseptic soap in your eyes, ears, nose, mouth, or vaginal area  3      You will need to shower the night before AND the morning of your Surgery  Shower 1:  · The evening before your operation, take the fist shower  · First, shampoo your hair with regular shampoo and rinse it completely before you use the anitseptic soap  After washing and rinsing your hair, rinse your body  · Next, use a clean wash cloth to apply the antiseptic soap and wash your body from the neck down to your toes using 1/2 bottle of the antiseptic soap  You will use the other 1/2 bottle for the second shower  · Clean the area where your incision will be; later this area well for about 2 minutes  · If you ar having head or neck surgery, wash areas with the antiseptic soap  · Rinse yourself completely with running water  · Use a clean towel to dry off  · Wear clean underwear and clothing/pajamas  Shower 2:  · The Morning of your operation, take the second shower following the same steps as Shower 1 using the second 1/2 of the bottle of antiseptic soap  · Use clean cloths and towels to was and dry yourself off  · Wear clean underwear and clothing

## 2018-11-12 ENCOUNTER — TELEPHONE (OUTPATIENT)
Dept: FAMILY MEDICINE CLINIC | Facility: HOSPITAL | Age: 63
End: 2018-11-12

## 2018-11-12 ENCOUNTER — OFFICE VISIT (OUTPATIENT)
Dept: FAMILY MEDICINE CLINIC | Facility: HOSPITAL | Age: 63
End: 2018-11-12
Payer: COMMERCIAL

## 2018-11-12 VITALS
HEIGHT: 68 IN | DIASTOLIC BLOOD PRESSURE: 78 MMHG | WEIGHT: 235 LBS | HEART RATE: 64 BPM | SYSTOLIC BLOOD PRESSURE: 132 MMHG | TEMPERATURE: 97.9 F | BODY MASS INDEX: 35.61 KG/M2

## 2018-11-12 DIAGNOSIS — Z01.818 PREOPERATIVE CLEARANCE: Primary | ICD-10-CM

## 2018-11-12 DIAGNOSIS — R94.31 LEFT AXIS DEVIATION: ICD-10-CM

## 2018-11-12 DIAGNOSIS — S83.282D TEAR OF LATERAL MENISCUS OF LEFT KNEE, CURRENT, UNSPECIFIED TEAR TYPE, SUBSEQUENT ENCOUNTER: ICD-10-CM

## 2018-11-12 DIAGNOSIS — I10 ESSENTIAL HYPERTENSION: ICD-10-CM

## 2018-11-12 PROCEDURE — 3075F SYST BP GE 130 - 139MM HG: CPT | Performed by: INTERNAL MEDICINE

## 2018-11-12 PROCEDURE — 99214 OFFICE O/P EST MOD 30 MIN: CPT | Performed by: INTERNAL MEDICINE

## 2018-11-12 PROCEDURE — 3078F DIAST BP <80 MM HG: CPT | Performed by: INTERNAL MEDICINE

## 2018-11-12 PROCEDURE — 3008F BODY MASS INDEX DOCD: CPT | Performed by: INTERNAL MEDICINE

## 2018-11-12 NOTE — PROGRESS NOTES
Subjective:     Nan Braun is a 70 y o  male who presents to the office today for a preoperative consultation at the request of surgeon Dr Padmini Arias who plans on performing arthroscopy knee with partial lateral menisectomy on November 19, 2018  Prior anesthesia adverse reactions - None    Exercise capacity - Able to walk 4 blocks without symptoms - Yes, has some SOB with with 2 flights of stairs    Easy bleeding/bruising - No    Chest pain/palpitation/edema - No    Dyspnea/wheezing/cough - No    Sleep apnea - No    HPI    Past Medical History:   Diagnosis Date    Hypertension 2016         Past Surgical History:   Procedure Laterality Date    COLONOSCOPY  01/18/2016    complete - serrted adenoma    CYST REMOVAL      DENTAL SURGERY  2017    Root canals due to cancer    INCISION AND DRAINAGE ABSCESS / HEMATOMA OF Alveda Villar / KNEE / THIGH  02/09/2015    rigjt upper, inner arm abscess    PORTACATH PLACEMENT  2011    And removed      TONSILLECTOMY      As kid    VASECTOMY  1978         Family History   Problem Relation Age of Onset    Atrial fibrillation Mother     COPD Father     Rheumatic fever Father     Glaucoma Father     Thyroid disease Sister     Cancer Brother     Hypertension Son     Cancer Brother     Diverticulitis Son          Social History   Substance Use Topics    Smoking status: Never Smoker    Smokeless tobacco: Never Used    Alcohol use Yes      Comment: 1 x week for 9 months/ year       Current Outpatient Prescriptions   Medication Sig Dispense Refill    amLODIPine (NORVASC) 5 mg tablet Take 1 tablet (5 mg total) by mouth daily for 90 days 90 tablet 0    hydrochlorothiazide (HYDRODIURIL) 25 mg tablet Take 1 tablet (25 mg total) by mouth daily for 90 days 90 tablet 3    losartan (COZAAR) 25 mg tablet Take 1 tablet (25 mg total) by mouth daily for 90 days 90 tablet 2     No current facility-administered medications for this visit          Patient has no known allergies  Review of Systems  Review of Systems   Constitutional: Negative for chills, fatigue and fever  HENT: Negative for congestion and sinus pain  Eyes: Negative for pain and visual disturbance  Respiratory: Negative for cough, chest tightness and shortness of breath  Cardiovascular: Negative for chest pain, palpitations and leg swelling  Gastrointestinal: Negative for abdominal pain, blood in stool, constipation, diarrhea, nausea and vomiting  Endocrine: Negative for polydipsia and polyuria  Genitourinary: Negative for difficulty urinating and dysuria  Musculoskeletal: Positive for arthralgias  Negative for myalgias  Skin: Negative for rash and wound  Neurological: Negative for dizziness and headaches  Hematological: Does not bruise/bleed easily  Psychiatric/Behavioral: Negative for behavioral problems and confusion  Objective:    /78   Pulse 64   Temp 97 9 °F (36 6 °C)   Ht 5' 8" (1 727 m)   Wt 107 kg (235 lb)   BMI 35 73 kg/m²     Physical Exam   Constitutional: He appears well-developed and well-nourished  No distress  HENT:   Head: Normocephalic and atraumatic  Right Ear: External ear normal    Left Ear: External ear normal    Mouth/Throat: Oropharynx is clear and moist    Eyes: Conjunctivae are normal  Right eye exhibits no discharge  Left eye exhibits no discharge  Neck: Normal range of motion  Neck supple  No tracheal deviation present  Cardiovascular: Normal rate, regular rhythm and normal heart sounds  No murmur heard  Neg carotid bruits B/L   Pulmonary/Chest: Effort normal and breath sounds normal  No respiratory distress  He has no wheezes  Abdominal: Soft  He exhibits no distension  There is no tenderness  There is no guarding  Musculoskeletal: He exhibits no deformity  Lymphadenopathy:     He has no cervical adenopathy  Neurological: He is alert  He exhibits normal muscle tone  Skin: Skin is warm and dry  No rash noted  Psychiatric: He has a normal mood and affect  His behavior is normal  Judgment normal    Nursing note and vitals reviewed        Cardiographics  EC18 - sinus farheen at 57 bpm, LAD, T wave flattening V2, V5, V6, no acute ischemic changes    Imaging  Chest x-ray: not indicated    Lab Review   Recent labs: CBC/CMP nml 10/24/18    Assessment:    Patient Active Problem List   Diagnosis    Apnea    Dyslipidemia    Hypertension    Intermittent claudication (HCC)    Lymphoma, head, face, or neck (HCC)    Macular degeneration    Obesity    Serrated adenoma of colon    Umbilical hernia    Primary osteoarthritis of both knees    Tear of lateral meniscus of left knee, current          Plan:       Surgical Clearance - pending discussion of ECG with Cardio and stress test    Risk - Pt low risk for low risk surgery - BW nml and no further labs needed, ECG showed LAD and T wave flattening but no prior ECG to compare to - will d/w Cardio and try and get ECG scheduled this week    Discussion/Summary:    (1) Lateral meniscus tear - for arthroscopy as per Ortho   (2) LAD - no previous ECG to compare to, has only mild SOB with exertion but has RF for CAD with HTN/age/male/obesity/dyslipidemia, will d/w Cardio and order stress test and determine if further w/u needed prior to surgery   (3) HTN - BP controlled - con't current medication   (4) Dyslipidemia - improved, con't healthy diet/exercise, monitoring off statin   (5) Obesity - diet/exercise/wgt loss encouraged        ADDENDUM 11/15/18 - STRESS TEST DONE AND NO ACUTE ISCHEMIA NOTED, ECG REVIEWED BY CARDIO - NO FURTHER WORK UP NEEDED, PT IS CLEARED FOR SURGERY    Nicole Rankin DO

## 2018-11-13 ENCOUNTER — APPOINTMENT (OUTPATIENT)
Dept: LAB | Facility: HOSPITAL | Age: 63
End: 2018-11-13
Attending: ORTHOPAEDIC SURGERY
Payer: COMMERCIAL

## 2018-11-13 DIAGNOSIS — S83.282A TEAR OF LATERAL MENISCUS OF LEFT KNEE, CURRENT, UNSPECIFIED TEAR TYPE, INITIAL ENCOUNTER: ICD-10-CM

## 2018-11-13 LAB
ANION GAP SERPL CALCULATED.3IONS-SCNC: 9 MMOL/L (ref 4–13)
BASOPHILS # BLD AUTO: 0.04 THOUSANDS/ΜL (ref 0–0.1)
BASOPHILS NFR BLD AUTO: 1 % (ref 0–1)
BUN SERPL-MCNC: 23 MG/DL (ref 5–25)
CALCIUM SERPL-MCNC: 8.7 MG/DL (ref 8.3–10.1)
CHLORIDE SERPL-SCNC: 103 MMOL/L (ref 100–108)
CO2 SERPL-SCNC: 28 MMOL/L (ref 21–32)
CREAT SERPL-MCNC: 1.11 MG/DL (ref 0.6–1.3)
EOSINOPHIL # BLD AUTO: 0.11 THOUSAND/ΜL (ref 0–0.61)
EOSINOPHIL NFR BLD AUTO: 2 % (ref 0–6)
ERYTHROCYTE [DISTWIDTH] IN BLOOD BY AUTOMATED COUNT: 13.6 % (ref 11.6–15.1)
GFR SERPL CREATININE-BSD FRML MDRD: 69 ML/MIN/1.73SQ M
GLUCOSE SERPL-MCNC: 143 MG/DL (ref 65–140)
HCT VFR BLD AUTO: 48.7 % (ref 36.5–49.3)
HGB BLD-MCNC: 16.2 G/DL (ref 12–17)
IMM GRANULOCYTES # BLD AUTO: 0.01 THOUSAND/UL (ref 0–0.2)
IMM GRANULOCYTES NFR BLD AUTO: 0 % (ref 0–2)
LYMPHOCYTES # BLD AUTO: 1.65 THOUSANDS/ΜL (ref 0.6–4.47)
LYMPHOCYTES NFR BLD AUTO: 33 % (ref 14–44)
MCH RBC QN AUTO: 29.8 PG (ref 26.8–34.3)
MCHC RBC AUTO-ENTMCNC: 33.3 G/DL (ref 31.4–37.4)
MCV RBC AUTO: 90 FL (ref 82–98)
MONOCYTES # BLD AUTO: 0.33 THOUSAND/ΜL (ref 0.17–1.22)
MONOCYTES NFR BLD AUTO: 7 % (ref 4–12)
NEUTROPHILS # BLD AUTO: 2.91 THOUSANDS/ΜL (ref 1.85–7.62)
NEUTS SEG NFR BLD AUTO: 57 % (ref 43–75)
NRBC BLD AUTO-RTO: 0 /100 WBCS
PLATELET # BLD AUTO: 200 THOUSANDS/UL (ref 149–390)
PMV BLD AUTO: 10.8 FL (ref 8.9–12.7)
POTASSIUM SERPL-SCNC: 3.9 MMOL/L (ref 3.5–5.3)
RBC # BLD AUTO: 5.43 MILLION/UL (ref 3.88–5.62)
SODIUM SERPL-SCNC: 140 MMOL/L (ref 136–145)
WBC # BLD AUTO: 5.05 THOUSAND/UL (ref 4.31–10.16)

## 2018-11-13 PROCEDURE — 36415 COLL VENOUS BLD VENIPUNCTURE: CPT

## 2018-11-13 PROCEDURE — 80048 BASIC METABOLIC PNL TOTAL CA: CPT

## 2018-11-13 PROCEDURE — 85025 COMPLETE CBC W/AUTO DIFF WBC: CPT

## 2018-11-13 RX ORDER — LOSARTAN POTASSIUM 25 MG/1
25 TABLET ORAL DAILY
Qty: 90 TABLET | Refills: 2 | Status: SHIPPED | OUTPATIENT
Start: 2018-11-13 | End: 2019-08-17 | Stop reason: SDUPTHER

## 2018-11-13 NOTE — TELEPHONE ENCOUNTER
ECG does not concern me  If he has reasonable (4-7 MET functional status), then I think cardiac w/u can wait      Hulan Callander

## 2018-11-14 ENCOUNTER — HOSPITAL ENCOUNTER (OUTPATIENT)
Dept: NON INVASIVE DIAGNOSTICS | Facility: CLINIC | Age: 63
Discharge: HOME | End: 2018-11-14
Payer: COMMERCIAL

## 2018-11-14 DIAGNOSIS — Z01.818 PREOPERATIVE CLEARANCE: ICD-10-CM

## 2018-11-14 DIAGNOSIS — R94.31 LEFT AXIS DEVIATION: ICD-10-CM

## 2018-11-14 DIAGNOSIS — I10 ESSENTIAL HYPERTENSION: ICD-10-CM

## 2018-11-14 LAB
CHEST PAIN STATEMENT: NORMAL
MAX DIASTOLIC BP: 80 MMHG
MAX HEART RATE: 106 BPM
MAX PREDICTED HEART RATE: 155 BPM
MAX. SYSTOLIC BP: 160 MMHG
PROTOCOL NAME: NORMAL
REASON FOR TERMINATION: NORMAL
TARGET HR FORMULA: NORMAL
TEST INDICATION: NORMAL
TIME IN EXERCISE PHASE: NORMAL

## 2018-11-14 PROCEDURE — A9502 TC99M TETROFOSMIN: HCPCS

## 2018-11-14 PROCEDURE — 93016 CV STRESS TEST SUPVJ ONLY: CPT | Performed by: INTERNAL MEDICINE

## 2018-11-14 PROCEDURE — 78452 HT MUSCLE IMAGE SPECT MULT: CPT | Performed by: INTERNAL MEDICINE

## 2018-11-14 PROCEDURE — 78452 HT MUSCLE IMAGE SPECT MULT: CPT

## 2018-11-14 PROCEDURE — 93017 CV STRESS TEST TRACING ONLY: CPT

## 2018-11-14 PROCEDURE — 93018 CV STRESS TEST I&R ONLY: CPT | Performed by: INTERNAL MEDICINE

## 2018-11-14 RX ADMIN — REGADENOSON 0.4 MG: 0.08 INJECTION, SOLUTION INTRAVENOUS at 09:22

## 2018-11-19 ENCOUNTER — ANESTHESIA EVENT (OUTPATIENT)
Dept: PERIOP | Facility: HOSPITAL | Age: 63
End: 2018-11-19
Payer: COMMERCIAL

## 2018-11-19 ENCOUNTER — ANESTHESIA (OUTPATIENT)
Dept: PERIOP | Facility: HOSPITAL | Age: 63
End: 2018-11-19
Payer: COMMERCIAL

## 2018-11-19 ENCOUNTER — HOSPITAL ENCOUNTER (OUTPATIENT)
Facility: HOSPITAL | Age: 63
Setting detail: OUTPATIENT SURGERY
Discharge: HOME | End: 2018-11-19
Attending: ORTHOPAEDIC SURGERY | Admitting: ORTHOPAEDIC SURGERY
Payer: COMMERCIAL

## 2018-11-19 VITALS
HEART RATE: 60 BPM | WEIGHT: 232.6 LBS | TEMPERATURE: 97.8 F | BODY MASS INDEX: 35.25 KG/M2 | OXYGEN SATURATION: 95 % | RESPIRATION RATE: 35 BRPM | DIASTOLIC BLOOD PRESSURE: 60 MMHG | HEIGHT: 68 IN | SYSTOLIC BLOOD PRESSURE: 112 MMHG

## 2018-11-19 DIAGNOSIS — S83.252D BUCKET-HANDLE TEAR OF LATERAL MENISCUS OF LEFT KNEE AS CURRENT INJURY, SUBSEQUENT ENCOUNTER: Primary | ICD-10-CM

## 2018-11-19 PROCEDURE — 29881 ARTHRS KNE SRG MNISECTMY M/L: CPT | Performed by: ORTHOPAEDIC SURGERY

## 2018-11-19 RX ORDER — ASPIRIN 325 MG
325 TABLET, DELAYED RELEASE (ENTERIC COATED) ORAL 2 TIMES DAILY
Qty: 60 TABLET | Refills: 0 | Status: SHIPPED | OUTPATIENT
Start: 2018-11-19 | End: 2019-01-08

## 2018-11-19 RX ORDER — CHLORHEXIDINE GLUCONATE 4 G/100ML
SOLUTION TOPICAL DAILY PRN
Status: DISCONTINUED | OUTPATIENT
Start: 2018-11-19 | End: 2018-11-19 | Stop reason: HOSPADM

## 2018-11-19 RX ORDER — MIDAZOLAM HYDROCHLORIDE 1 MG/ML
INJECTION INTRAMUSCULAR; INTRAVENOUS AS NEEDED
Status: DISCONTINUED | OUTPATIENT
Start: 2018-11-19 | End: 2018-11-19 | Stop reason: SURG

## 2018-11-19 RX ORDER — ONDANSETRON 2 MG/ML
4 INJECTION INTRAMUSCULAR; INTRAVENOUS EVERY 6 HOURS PRN
Status: DISCONTINUED | OUTPATIENT
Start: 2018-11-19 | End: 2018-11-19 | Stop reason: HOSPADM

## 2018-11-19 RX ORDER — ONDANSETRON 2 MG/ML
INJECTION INTRAMUSCULAR; INTRAVENOUS AS NEEDED
Status: DISCONTINUED | OUTPATIENT
Start: 2018-11-19 | End: 2018-11-19 | Stop reason: SURG

## 2018-11-19 RX ORDER — KETOROLAC TROMETHAMINE 30 MG/ML
INJECTION, SOLUTION INTRAMUSCULAR; INTRAVENOUS AS NEEDED
Status: DISCONTINUED | OUTPATIENT
Start: 2018-11-19 | End: 2018-11-19 | Stop reason: SURG

## 2018-11-19 RX ORDER — PROPOFOL 10 MG/ML
INJECTION, EMULSION INTRAVENOUS AS NEEDED
Status: DISCONTINUED | OUTPATIENT
Start: 2018-11-19 | End: 2018-11-19 | Stop reason: SURG

## 2018-11-19 RX ORDER — OXYCODONE HYDROCHLORIDE AND ACETAMINOPHEN 5; 325 MG/1; MG/1
TABLET ORAL
Qty: 20 TABLET | Refills: 0 | Status: SHIPPED | OUTPATIENT
Start: 2018-11-19 | End: 2018-12-04

## 2018-11-19 RX ORDER — OXYCODONE HYDROCHLORIDE AND ACETAMINOPHEN 5; 325 MG/1; MG/1
1 TABLET ORAL EVERY 4 HOURS PRN
Status: DISCONTINUED | OUTPATIENT
Start: 2018-11-19 | End: 2018-11-19 | Stop reason: HOSPADM

## 2018-11-19 RX ORDER — FENTANYL CITRATE 50 UG/ML
INJECTION, SOLUTION INTRAMUSCULAR; INTRAVENOUS AS NEEDED
Status: DISCONTINUED | OUTPATIENT
Start: 2018-11-19 | End: 2018-11-19 | Stop reason: SURG

## 2018-11-19 RX ORDER — METOCLOPRAMIDE HYDROCHLORIDE 5 MG/ML
10 INJECTION INTRAMUSCULAR; INTRAVENOUS ONCE
Status: DISCONTINUED | OUTPATIENT
Start: 2018-11-19 | End: 2018-11-19 | Stop reason: HOSPADM

## 2018-11-19 RX ORDER — ONDANSETRON 2 MG/ML
4 INJECTION INTRAMUSCULAR; INTRAVENOUS ONCE
Status: DISCONTINUED | OUTPATIENT
Start: 2018-11-19 | End: 2018-11-19 | Stop reason: HOSPADM

## 2018-11-19 RX ORDER — SODIUM CHLORIDE, SODIUM LACTATE, POTASSIUM CHLORIDE, CALCIUM CHLORIDE 600; 310; 30; 20 MG/100ML; MG/100ML; MG/100ML; MG/100ML
125 INJECTION, SOLUTION INTRAVENOUS CONTINUOUS
Status: DISCONTINUED | OUTPATIENT
Start: 2018-11-19 | End: 2018-11-19 | Stop reason: HOSPADM

## 2018-11-19 RX ORDER — HYDROMORPHONE HCL/PF 1 MG/ML
0.5 SYRINGE (ML) INJECTION
Status: DISCONTINUED | OUTPATIENT
Start: 2018-11-19 | End: 2018-11-19 | Stop reason: HOSPADM

## 2018-11-19 RX ORDER — FENTANYL CITRATE/PF 50 MCG/ML
25 SYRINGE (ML) INJECTION
Status: DISCONTINUED | OUTPATIENT
Start: 2018-11-19 | End: 2018-11-19 | Stop reason: HOSPADM

## 2018-11-19 RX ORDER — BUPIVACAINE HYDROCHLORIDE AND EPINEPHRINE 5; 5 MG/ML; UG/ML
INJECTION, SOLUTION EPIDURAL; INTRACAUDAL; PERINEURAL AS NEEDED
Status: DISCONTINUED | OUTPATIENT
Start: 2018-11-19 | End: 2018-11-19 | Stop reason: HOSPADM

## 2018-11-19 RX ADMIN — MIDAZOLAM HYDROCHLORIDE 2 MG: 1 INJECTION, SOLUTION INTRAMUSCULAR; INTRAVENOUS at 12:13

## 2018-11-19 RX ADMIN — ONDANSETRON 4 MG: 2 INJECTION INTRAMUSCULAR; INTRAVENOUS at 12:46

## 2018-11-19 RX ADMIN — CEFAZOLIN SODIUM 2000 MG: 2 SOLUTION INTRAVENOUS at 12:13

## 2018-11-19 RX ADMIN — KETOROLAC TROMETHAMINE 15 MG: 30 INJECTION, SOLUTION INTRAMUSCULAR at 12:51

## 2018-11-19 RX ADMIN — FENTANYL CITRATE 100 MCG: 50 INJECTION, SOLUTION INTRAMUSCULAR; INTRAVENOUS at 12:04

## 2018-11-19 RX ADMIN — OXYCODONE HYDROCHLORIDE AND ACETAMINOPHEN 1 TABLET: 5; 325 TABLET ORAL at 14:09

## 2018-11-19 RX ADMIN — PROPOFOL 200 MG: 10 INJECTION, EMULSION INTRAVENOUS at 12:09

## 2018-11-19 RX ADMIN — SODIUM CHLORIDE, SODIUM LACTATE, POTASSIUM CHLORIDE, AND CALCIUM CHLORIDE 125 ML/HR: .6; .31; .03; .02 INJECTION, SOLUTION INTRAVENOUS at 09:58

## 2018-11-19 NOTE — OP NOTE
Orthopedics ARTHROSCOPY KNEE PARTIAL  LATERAL MENISECTOMY  Op Note      Pre-op Diagnosis:   Lateral meniscus tear    Post-op Diagnosis:  Same the lateral femoral condyle lesion, multiple loose bodies    Procedure:  Left knee arthroscopy with partial lateral meniscectomy, loose body removal    Surgeon:  Diane Frey MD    I was present for the entire procedure and A qualified resident physician was not available    Anesthesia:  LMA    Tourniquet Time:  21 min    Estimated Blood Loss:  Minimal    Material sent to lab:  None      Complications:  None    Findings:  Examination under anesthesia revealed the knee that was stable to varus and valgus stress, negative Lockman, and a stable posterior drawer  Arthroscopic examination revealed a knee with a Severe chondral damage of the trochlear groove, grade 2 and three changes of the patella, intact medial meniscus with mild fraying, 1 x 1 cm grade 4 chondral lesion of the lateral aspect of the lateral femoral condyle and a unstable degenerative posterior horn lateral meniscus tear, multiple articular loose bodies throughout the knee primarily in the gutters  Indications:  70 y o  y/o male with pain in his left knee with exam and MRI consistent with a lateral meniscal tear  The patient was unresponsive to nonoperative management  Treatment options were discussed, and the patient wished to proceed with surgical intervention  Risks and benefits of surgery were discussed which included but were not limited to infection, neurovascular damage, incomplete resolution of symptoms, wound healing problems, stiffness, need for further surgery, DVT, PE, and death  Informed consent was obtained  Procedure: The patient was met preoperatively and the left knee was identified and signed  The patient was taken back to the operating room where a General LMA was performed   A well-padded tourniquet was placed on the left thigh and the leg was sterilely prepped and draped in the usual fashion  A timeout was held identifying the patient, side, site, antibiotics, and allergies  The patient received Ancef preoperatively  The leg was exsanguinated with an Esmarch and the tourniquet inflated to 250 mmHg  Portal sites were injected with 10 mL of half percent Marcaine with epinephrine, and an 11 blade was used to make an anterior medial, anterior lateral, and superior lateral portal  An outflow cannula was placed in the superior lateral portal  A diagnostic knee arthroscopy was performed with the above-stated results  The tear was debrided back to a stable rim with a biter and a shaver  The chondral lesion was debrided back to a stable rim  All loose pieces were removed  The tourniquet was let down  Hemostasis was obtained  20 mL of Marcaine were placed intra-articularly  Portal sites were closed with 4-0 nylon  A sterile dressing was placed  Disposition:  PACU    Plan:  Patient will follow-up in 7-10 days for wound check and suture removal  The patient will be weightbearing as tolerated, will be encouraged to work on knee range of motion, and will follow a postoperative knee strengthening program  The patient will ice as needed  The patient will be instructed to take 325 mg of aspirin twice daily for a month as long as our no contraindications       [email or web address]@     Date: 11/19/2018  Time: 12:56 PM

## 2018-11-19 NOTE — DISCHARGE INSTRUCTIONS
The principal surgical findings in your knee were:    1  Left knee lateral meniscus tear    2  Left knee loose bodies    The following corrective procedures were performed:     1  Arthroscopic removal of loose bodies  2  Arthroscopic partial meniscectomy       FOLLOW-UP:     You will need an appt  in: As scheduled   Please call the office at   GENERAL INSTRUCTIONS:    ·  Apply an ice pack to your knee for the next 12-24 hours  ·  If crutches were prescribed, you should limit weight bearing at all times as instructed  Keep knee stiff the first day, avoid too much bending  ·  Take it easy for at least 24 hours  Do not drive for 3-5 days  You may move about, but stay around home or hotel  ·  If you have an upset stomach, take only cool, clear liquids, such as Gatorade, Jello, or Ginger ale  If nausea persists for more than 25 hours, notify my office  ·  Low grade temperature is not uncommon after surgery  However, if your temperature exceeds 101 degrees, please notify my office  ·  The Novocain in your knee will keep your knee numb until tonight  When the medicine wears off, you will feel pain for several days to one week  This is normal after arthroscopic surgery  ·  On the day after surgery, you may walk normally and bend the knee normally  ·       You may continue using a cane or crutches to minimize any discomfort the first 24 to 48 hours  ·  It is normal to have swelling and discomfort in the knee for several days to one week after arthroscopic surgery  ·  You should take on 325 mg enteric-coated aspirin in the morning and one tablet at night to help minimize the chance of developing phlebitis (clots in the vein)  This should also be taken with food or a glass of milk to avoid stomach upset  Examples of enteric-coated aspirin are Ecotrin, Ascriptin, Or Bufferin  DO NOT TAKE this if you are known to be allergic to it or have a prior history of stomach ulcer disease    NOTE:  If, after taking the enteric coated aspirin, you develop any pain in the stomach, nausea, vomiting, or stomach irritation, you should stop the medication immediately because it may cause stomach ulcers  Also, if you continue taking this medication while you are having pain in the stomach or nausea or stomach cramps, an ulcer could develop  ·       To reduce pain and swelling, place several pillows under your knee for the first 24 to 48 hours  The knee should be elevated above the heart  Ice should be applied to the knee during this period and this will help decrease discomfort and swelling  Apply to the knee for 30 minutes every 2 hours  ·       Any prescription you received before surgery or after surgery should be filled immediately, and taken according to directions on the label  Taking the medicine with food or with a glass of milk will avoid stomach upset  ·       Weight bearing as per instructions from the surgeon  EXERCISES:      Begin doing gentle exercises right away  Exercising will reduce the swelling, increase motion, and prevent muscle weakness  The following exercises should be performed during the first week and a half, following surgery  The advanced knee exercise program will be started when you are seen in the office  1  QUAD SETS: Straighten as straight as possible and then clench the thigh muscles tightly  Keep the muscles clenched tightly to the slow count of 3 then relax  Repeat this exercise 10-30 times every hour  2  STRAIGHT LEG RAISING: With the knee held straight and the quadriceps muscle contracted, raise your leg up 6 to 8 inches and hold it to the slow count of 3  Repeat this exercise 10-30 times every hour  3  RANGE OF MOTION: You should start bending your knee to the point of pain and increase bending it until full motion is obtained  Repeat this exercise 10-30 times every hour          For the first 48 hours inhale deeply and hold your breath for 3 seconds; exhale completely  Repeat 10 times, 4 times daily  If you smoke, avoid cigarettes for 48 hours  BANDAGES:     ·  Your bandage may show blood stains within 1-12 hours  This is motly fluid that was used to irrigate your knee, slightly tinged with blood  It is no cause for concern  However, if your bandage becomes saturated, notify my office right away  ·       You may remove the bulky dressings in 2 days and applied band aids to the small skin incisions  You may shower in 3 days after surgery  WORK:   Plan to take 3 or 4 days off from work  You can resume work when you are comfortable  (This can be a week or more depending on the type of work you do)  Do not walk or stand for excessive periods  Do not operate heavy machinery that requires pedals

## 2018-11-19 NOTE — H&P (VIEW-ONLY)
Assessment:     1  Tear of lateral meniscus of left knee, current, unspecified tear type, initial encounter          Plan:     Problem List Items Addressed This Visit        Musculoskeletal and Integument    Tear of lateral meniscus of left knee, current     40-year-old male with left knee lateral meniscus tear that underlying chondral injury  I discussed the diagnosis and treatment options with him  Given the fact that he failed to have any long-term relief with the cortisone injection, and based on his physical examination and symptoms, I think he would have significant relief with surgery  We discussed that he may not have 100% pain relief, but no likely look good he should have 70-80% improvement in his symptoms  Should be able to get back to doing a lot of his activities with significant improvement  Risks and benefits of surgery were discussed with him and informed consent was obtained  Patient's preoperative paperwork was filled out today  He should have medical clearance preoperatively  Relevant Orders    Case request operating room: ARTHROSCOPY KNEE PARTIAL  LATERAL MENISECTOMY (Completed)    Basic metabolic panel    CBC and differential    Ambulatory referral to Internal Medicine    EKG 12 lead           Patient ID: Avinash Gonzalez is a 70 y o  male  Chief Complaint:  Left knee pain    HPI:  40-year-old male with pain in his left knee  This has been going on since July  He does not recall a very specific event, but he was at [facility] for a while and recalls standing on his knee and getting his knee twisted when he was putting gas in his car  Since that time he has had significant swelling and pain in the knee  He was seen an outside orthopedist and was given a cortisone injection which gave him approximately 2-3 weeks of significant pain relief, then it wore off  Since that time he has been having a lot of problems    He is able to walk, but any bending with weight on the knee is almost unbearable  He enjoys fishing and is having a lot of trouble getting down in [place]  Going from straightening to bending the knee is very painful for him even when he is lying down  He denies any catching or locking  He has not been taking any medication for it  He ices occasionally  The patient is wanting to know what can be done with this so he can get back to his normal activities  Patient's medical intake was reviewed  Allergy:  No Known Allergies    Medications:  all current active meds have been reviewed    Past Medical History:  Past Medical History:   Diagnosis Date    Hypertension 2016    Lymphoma ([address] )     in remission       Past Surgical History:  Past Surgical History:   Procedure Laterality Date    COLONOSCOPY  01/18/2016    complete - serrted adenoma    DENTAL SURGERY  2017    Root canals due to cancer    INCISION AND DRAINAGE ABSCESS / HEMATOMA OF Roseline Laity / KNEE / THIGH  02/09/2015    rigjt upper, inner arm abscess    PORTACATH PLACEMENT  2011    And removed      TONSILLECTOMY      As kid    VASECTOMY  1978       Family History:  Family History   Problem Relation Age of Onset    Atrial fibrillation Mother     COPD Father     Rheumatic fever Father     Glaucoma Father     Thyroid disease Sister     Cancer Brother     Hypertension Son     Cancer Brother     Diverticulitis Son        Social History:  History   Alcohol Use    Yes     Comment: rare alcohol use - socially     History   Drug Use No     History   Smoking Status    Never Smoker   Smokeless Tobacco    Never Used           ROS:  Review of Systems   Constitutional: Negative  HENT: Negative  Eyes: Negative  Respiratory: Negative  Cardiovascular: Negative  Gastrointestinal: Negative  Endocrine: Negative  Genitourinary: Negative  Musculoskeletal: Positive for arthralgias and joint swelling  Skin: Negative  Allergic/Immunologic: Negative  Neurological: Negative  Hematological: Negative  Psychiatric/Behavioral: Negative  Objective:  BP Readings from Last 1 Encounters:   10/30/18 129/75      Wt Readings from Last 1 Encounters:   10/30/18 107 kg (236 lb)        BMI:   Estimated body mass index is 35 88 kg/m² as calculated from the following:    Height as of this encounter: 5' 8" (1 727 m)  Weight as of this encounter: 107 kg (236 lb)  EXAM:   Physical Exam   Constitutional: He is oriented to person, place, and time  He appears well-developed and well-nourished  No distress  HENT:   Head: Normocephalic and atraumatic  Eyes: Right eye exhibits no discharge  Left eye exhibits no discharge  Neck: Normal range of motion  Neck supple  No tracheal deviation present  Cardiovascular: Normal rate, regular rhythm and normal heart sounds  Pulmonary/Chest: Effort normal and breath sounds normal  No respiratory distress  Abdominal: Soft  He exhibits no distension  There is no tenderness  Musculoskeletal:        Right knee: He exhibits no effusion  Left knee: He exhibits no effusion  Neurological: He is alert and oriented to person, place, and time  Skin: Skin is warm  He is not diaphoretic  No erythema  Psychiatric: He has a normal mood and affect  His behavior is normal      Right Knee Exam   Right knee exam is normal     Tenderness   The patient is experiencing no tenderness  Range of Motion   The patient has normal right knee ROM  Muscle Strength     The patient has normal right knee strength      Tests   Maryanne:  Medial - negative Lateral - negative  Lachman:  Anterior - negative      Drawer:       Posterior - negative  Varus: negative  Valgus: negative  Pivot Shift: negative  Patellar Apprehension: negative    Other   Erythema: absent  Sensation: normal  Pulse: present  Swelling: none  Other tests: no effusion present      Left Knee Exam     Tenderness   The patient is experiencing tenderness in the medial joint line and lateral joint line  Range of Motion   Extension: normal   Flexion: 120     Tests   Maryanne:  Medial - negative Lateral - positive  Lachman:  Anterior - negative      Drawer:       Posterior - negative  Varus: negative    Pivot Shift: negative  Patellar Apprehension: negative    Other   Sensation: normal  Pulse: present  Swelling: none  Effusion: no effusion present    Comments:  Patient with mild quadriceps atrophy, very mild crepitus on knee range of motion              Radiographs:  I have personally reviewed pertinent films in PACS and my interpretation is X-rays of the left knee are reviewed  There is some very mild osteoarthritis noted on x-rays  MRI is reviewed which shows a significant lateral meniscus tear, complex of the posterior half  There is a chondral lesion noted on the posterior aspect of the lateral femoral condyle  ACL, mcl, PCL, ACL, and medial meniscus are intact

## 2018-11-19 NOTE — DISCHARGE SUMMARY
[facility] [place]  Quillian Simmonds, 70 y o  male  MRN: [id number]      Preoperative diagnosis: left knee lateral meniscus tear, loose bodies    Postoperative diagnosis: left knee lateral meniscus tear, loose bodies    Procedure: left knee arthroscopy with partial lateral meniscectomy and removal of loose bodies    After procedure, patient was brought to PACU  Pain was controlled with IV and oral pain medication  Patient was discharged to home after cleared by anesthesia and when criteria was met  Condition: good    Discharge medications:   See after visit summary for reconciled discharge medications provided to patient and family  Please refer to discharge instructions for further information

## 2018-11-19 NOTE — ANESTHESIA PREPROCEDURE EVALUATION
Review of Systems/Medical History  Patient summary reviewed  Chart reviewed      Cardiovascular  EKG reviewed, Negative cardio ROS Hypertension controlled,    Pulmonary  Negative pulmonary ROS        GI/Hepatic  Negative GI/hepatic ROS          Negative  ROS        Endo/Other  Negative endo/other ROS   Obesity    GYN  Negative gynecology ROS          Hematology  Negative hematology ROS     Comment: H/o lymphoma Musculoskeletal  Negative musculoskeletal ROS   Arthritis     Neurology  Negative neurology ROS      Psychology   Negative psychology ROS              Physical Exam    Airway    Mallampati score: II  TM Distance: >3 FB  Neck ROM: full     Dental   No notable dental hx     Cardiovascular  Comment: Negative ROS, Rhythm: regular, Rate: normal, Cardiovascular exam normal    Pulmonary  Pulmonary exam normal Breath sounds clear to auscultation,     Other Findings        Anesthesia Plan  ASA Score- 2     Anesthesia Type- general with ASA Monitors  Additional Monitors:   Airway Plan:         Plan Factors-    Induction- intravenous  Postoperative Plan-     Informed Consent- Anesthetic plan and risks discussed with patient  I personally reviewed this patient with the CRNA  Discussed and agreed on the Anesthesia Plan with the MAGY Bellamy

## 2018-12-04 ENCOUNTER — OFFICE VISIT (OUTPATIENT)
Dept: OBGYN CLINIC | Facility: CLINIC | Age: 63
End: 2018-12-04

## 2018-12-04 VITALS
HEIGHT: 68 IN | WEIGHT: 237 LBS | HEART RATE: 87 BPM | SYSTOLIC BLOOD PRESSURE: 143 MMHG | DIASTOLIC BLOOD PRESSURE: 83 MMHG | BODY MASS INDEX: 35.92 KG/M2

## 2018-12-04 DIAGNOSIS — S83.272D COMPLEX TEAR OF LATERAL MENISCUS OF LEFT KNEE AS CURRENT INJURY, SUBSEQUENT ENCOUNTER: ICD-10-CM

## 2018-12-04 DIAGNOSIS — M17.0 PRIMARY OSTEOARTHRITIS OF BOTH KNEES: Primary | ICD-10-CM

## 2018-12-04 PROBLEM — S83.282A TEAR OF LATERAL MENISCUS OF LEFT KNEE, CURRENT: Status: RESOLVED | Noted: 2018-10-30 | Resolved: 2018-12-04

## 2018-12-04 PROCEDURE — 99024 POSTOP FOLLOW-UP VISIT: CPT | Performed by: ORTHOPAEDIC SURGERY

## 2018-12-04 NOTE — PROGRESS NOTES
Assessment:     1  Primary osteoarthritis of both knees    2  Complex tear of lateral meniscus of left knee as current injury, subsequent encounter          Plan:     Problem List Items Addressed This Visit        Musculoskeletal and Integument    Primary osteoarthritis of both knees - Primary    Relevant Orders    Ambulatory referral to Physical Therapy    RESOLVED: Tear of lateral meniscus of left knee, current     71 y/o M s/p L knee lateral meniscus debridement and loose body removal   Patient overall is doing very well  He still having some swelling in the knee  I have recommended physical therapy which he did wish to go ahead and proceed with  He will follow up in 4-6 weeks for repeat evaluation  Consider viscosupplementation if appropriate at that time  Relevant Orders    Ambulatory referral to Physical Therapy           Patient ID: Leisa Uriarte is a 70 y o  male  Chief Complaint:  Postop check    Subjective:  77-year-old male status post left knee arthroscopy with lateral meniscus debridement and multiple loose body removals  He was found to have significant trochlear as well as lateral femoral condyle articular deficits  Overall he is doing well  He complains of soreness, pain, and swelling  He feels that the knee gives out on him once in a while just when he standing  He has been getting some muscle spasms in the posterior aspect of his thigh  Allergy:  No Known Allergies    Medications:  all current active meds have been reviewed    ROS:  Review of Systems   Musculoskeletal: Positive for arthralgias, joint swelling and myalgias  All other systems reviewed and are negative        Objective:  BP Readings from Last 1 Encounters:   12/04/18 143/83      Wt Readings from Last 1 Encounters:   12/04/18 108 kg (237 lb)        Exam:   Physical Exam  Left Knee Exam     Comments:  Moderate size knee effusion, active range of motion , stable to varus and valgus stress, no calf tenderness, no ankle swelling

## 2018-12-04 NOTE — ASSESSMENT & PLAN NOTE
69 y/o M s/p L knee lateral meniscus debridement and loose body removal   Patient overall is doing very well  He still having some swelling in the knee  I have recommended physical therapy which he did wish to go ahead and proceed with  He will follow up in 4-6 weeks for repeat evaluation  Consider viscosupplementation if appropriate at that time

## 2018-12-05 ENCOUNTER — EVALUATION (OUTPATIENT)
Dept: PHYSICAL THERAPY | Facility: CLINIC | Age: 63
End: 2018-12-05
Payer: COMMERCIAL

## 2018-12-05 DIAGNOSIS — M17.0 PRIMARY OSTEOARTHRITIS OF BOTH KNEES: ICD-10-CM

## 2018-12-05 DIAGNOSIS — S83.272D COMPLEX TEAR OF LATERAL MENISCUS OF LEFT KNEE AS CURRENT INJURY, SUBSEQUENT ENCOUNTER: ICD-10-CM

## 2018-12-05 PROCEDURE — G8978 MOBILITY CURRENT STATUS: HCPCS | Performed by: PHYSICAL THERAPIST

## 2018-12-05 PROCEDURE — 97110 THERAPEUTIC EXERCISES: CPT | Performed by: PHYSICAL THERAPIST

## 2018-12-05 PROCEDURE — G8979 MOBILITY GOAL STATUS: HCPCS | Performed by: PHYSICAL THERAPIST

## 2018-12-05 PROCEDURE — 97162 PT EVAL MOD COMPLEX 30 MIN: CPT | Performed by: PHYSICAL THERAPIST

## 2018-12-05 NOTE — PROGRESS NOTES
PT Evaluation     Today's date: 2018  Patient name: Lary Whitten  : 1955  MRN: [id number]  Referring provider: Socorro Heller MD  Dx:   Encounter Diagnosis     ICD-10-CM    1  Primary osteoarthritis of both knees M17 0 Ambulatory referral to Physical Therapy   2  Complex tear of lateral meniscus of left knee as current injury, subsequent encounter S83 435D Ambulatory referral to Physical Therapy                  Assessment  Assessment details: Patient is a 70 y o  male presenting to outpatient PT s/p (L) knee arthroscopy  Patient describes injuring his (L) knee about 6 months ago twisting getting into his car with persistent pain leading up to surgery  Patient displays with abnormal gait, abnormal posture, (B) hip and (L) knee strength deficits, (L) knee AROM restrictions, hamstring flexibility restrictions, balance deficits, and functional restrictions with pathanatomical signs and symptoms consistent with s/p (L) knee arthroscopy, (L) meniscus tear, and (B) knee OA  Skilled PT is required to decrease pain and improve strength and ROM to allow patient to return to desired level of function with all activities  Thank you very much for your referral!  Impairments: abnormal gait, abnormal muscle tone, abnormal or restricted ROM, abnormal movement, activity intolerance, impaired balance, impaired physical strength, lacks appropriate home exercise program, pain with function and poor body mechanics  Understanding of Dx/Px/POC: good   Prognosis: good    Goals  ST  Decrease pain to 4/10 max in 3 weeks  2  Increase (L) knee AROM: 0-130 degrees in 3 weeks  3  Increase (B) hip and (L) knee strength by 1/2 MMT grade in 3 weeks  4  Improve SL balance to 10 seconds in 3 weeks  5  Improve hamstring flexibility by 5 degrees in 3 weeks  6  I with HEP in 3 weeks  7  Improve FOTO score to 56 in 3 weeks  LTG  1  Decrease pain to 2/10 max in 6 weeks    2  Increase (L) knee AROM: 0-135 degrees in 6 weeks   3  Increase (B) hip and (L) knee strength to 4+/5 all planes in 6 weeks  4  Improve SL balance to 15 seconds in 6 weeks  5  Improve hamstring flexibility by 10 degrees in 6 weeks  6  I with HEP in 6 weeks  7  Improve FOTO score to 63 in 6 weeks  Plan  Patient would benefit from: skilled physical therapy  Planned modality interventions: cryotherapy and thermotherapy: hydrocollator packs  Planned therapy interventions: joint mobilization, manual therapy, neuromuscular re-education, patient education, strengthening, stretching, therapeutic activities, therapeutic exercise, home exercise program, body mechanics training, activity modification, functional ROM exercises, gait training and balance  Frequency: 2x week  Duration in visits: 12  Duration in weeks: 6  Plan of Care beginning date: 2018  Plan of Care expiration date: 2019  Treatment plan discussed with: patient        Subjective Evaluation    History of Present Illness  Mechanism of injury: Patient reports twisting his (L) knee about 6 months ago getting out of his car  He reports seeing an orthopedic specialist - cortisone injection was performed which helped his symptoms for about 3 weeks  He returned to the orthopedic specialist and had an MRI which showed a meniscus tear  The decision was made to have surgery - (L) arthroscopy with medial and lateral menisectomy was performed on 18  At his follow up visit PT was recommended      Quality of life: good    Pain  At worst pain ratin  Location: (L) Knee   Quality: dull ache and sharp    Social Support  Steps to enter house: yes  Stairs in house: yes   Lives in: multiple-level home    Employment status: working ( - Desk job with walking)    Diagnostic Tests  X-ray: abnormal (Osteoarthritis )  MRI studies: abnormal (Meniscus tear )  Treatments  Previous treatment: injection treatment  Patient Goals  Patient goals for therapy: decreased pain, improved balance, increased motion, increased strength and return to sport/leisure activities  Patient goal: Return to trout fishing         Objective     Static Posture   General Observations  Asymmetrical weight bearing and shifted right  Palpation     Additional Palpation Details  TTP (L) knee lateral joint line    Active Range of Motion   Left Knee   Flexion: 120 degrees   Extension: -3 degrees     Right Knee   Flexion: 140 degrees   Extension: 0 degrees     Passive Range of Motion   Left Knee   Extension: 0 degrees     Mobility   Patellar Mobility:   Left Knee   WFL: medial, lateral, superior and inferior  Strength/Myotome Testing     Left Hip   Planes of Motion   Flexion: 4+  Extension: 4  Abduction: 4-    Right Hip   Planes of Motion   Flexion: 4+  Extension: 4  Abduction: 4    Left Knee   Flexion: 4-  Extension: 4+    Right Knee   Flexion: 4+  Extension: 4+    Left Ankle/Foot   Dorsiflexion: 4+  Plantar flexion: 4+    Right Ankle/Foot   Dorsiflexion: 5  Plantar flexion: 5    Tests     Additional Tests Details  90/90 HS Lag: (B) -30 degrees   Additional special testing not performed secondary to post op status     Ambulation     Observational Gait   Decreased walking speed and left stance time     Left arm swing: decreased  Right arm swing: decreased  Base of support: increased    Functional Assessment     Single Leg Stance   Left: 2 (Hip strategy) seconds  Right: 6 (Ankle strategy ) seconds      Daily Treatment Diary     DX: (L) knee arthroscopy - menisectomy, (B) knee OA  EPOC: 1/16/19  Precautions: standard  CO-MORBIDITES: hypertension  PERSONAL FACTORS: N/A    Manual           (L) Knee PROM                                                      Exercise Diary           Recumbent Bike          SL Balance          SL Balance with reach - 5 Way                    Std Hip Flex          Std Hip ABD          Std Hip Ext          HS Curls           Mini Squats          FWD Mini Lunge          LAT Mini Lunge          FWD Step Up          LAT Step Up                    SLR          S/L Hip ABD          Bridge with CA Hip ABD                    Heel Slides                                   Modalities

## 2018-12-07 ENCOUNTER — OFFICE VISIT (OUTPATIENT)
Dept: PHYSICAL THERAPY | Facility: CLINIC | Age: 63
End: 2018-12-07
Payer: COMMERCIAL

## 2018-12-07 DIAGNOSIS — M17.0 PRIMARY OSTEOARTHRITIS OF BOTH KNEES: Primary | ICD-10-CM

## 2018-12-07 DIAGNOSIS — S83.272D COMPLEX TEAR OF LATERAL MENISCUS OF LEFT KNEE AS CURRENT INJURY, SUBSEQUENT ENCOUNTER: ICD-10-CM

## 2018-12-07 PROCEDURE — 97112 NEUROMUSCULAR REEDUCATION: CPT

## 2018-12-07 PROCEDURE — 97110 THERAPEUTIC EXERCISES: CPT

## 2018-12-07 NOTE — PROGRESS NOTES
Daily Note     Today's date: 2018  Patient name: Judy Tidwell  : 1955  MRN: [id number]  Referring provider: Justin Oliver MD  Dx:   Encounter Diagnosis     ICD-10-CM    1  Primary osteoarthritis of both knees M17 0    2  Complex tear of lateral meniscus of left knee as current injury, subsequent encounter S82 061D                   Subjective: Pt  Stated the exercises given at IE made his knee very sore  Objective: See treatment diary below      Assessment: Tolerated treatment well  Patient demonstrated fatigue post treatment, exhibited good technique with therapeutic exercises and would benefit from continued PT  Initiated PT POC today  Pt  Tolerated TE's well  Some minor discomfort t/o  ROM is improving significantly since last week  Pt  Is very happy with his progress so far, and stated post session his knee felt amazingly well considering all of the exercises he had just done  Plan: Continue per plan of care  Progress treatment as tolerated          Daily Treatment Diary     DX: (L) knee arthroscopy - menisectomy, (B) knee OA  EPOC: 19  Precautions: standard  CO-MORBIDITES: hypertension  PERSONAL FACTORS: N/A    Manual           (L) Knee PROM 5'                                                     Exercise Diary           Recumbent Bike 5'         SL Balance nv         SL Balance with reach - 5 Way future                   Gastroc stretch 15"x5 ea         HE/TR 20         Std Hip Flex 20         Std Hip ABD 10 ea         Std Hip Ext 10 ea         HS Curls  20         Mini Squats 10         FWD Mini Lunge          LAT Mini Lunge 10 ea         FWD Step Up 4"x10         LAT Step Up 4"x10                   SLR 10x2         S/L Hip ABD 10x2         Bridge with ND Hip ABD 5"x10                   Heel Slides  5"x10                                 Modalities

## 2018-12-10 ENCOUNTER — OFFICE VISIT (OUTPATIENT)
Dept: PHYSICAL THERAPY | Facility: CLINIC | Age: 63
End: 2018-12-10
Payer: COMMERCIAL

## 2018-12-10 DIAGNOSIS — M17.0 PRIMARY OSTEOARTHRITIS OF BOTH KNEES: Primary | ICD-10-CM

## 2018-12-10 DIAGNOSIS — S83.272D COMPLEX TEAR OF LATERAL MENISCUS OF LEFT KNEE AS CURRENT INJURY, SUBSEQUENT ENCOUNTER: ICD-10-CM

## 2018-12-10 PROCEDURE — 97110 THERAPEUTIC EXERCISES: CPT | Performed by: PHYSICAL THERAPIST

## 2018-12-10 PROCEDURE — 97112 NEUROMUSCULAR REEDUCATION: CPT | Performed by: PHYSICAL THERAPIST

## 2018-12-10 NOTE — PROGRESS NOTES
Daily Note     Today's date: 12/10/2018  Patient name: Alhaji Eng  : 1955  MRN: [id number]  Referring provider: Lucia Zarco MD  Dx:   Encounter Diagnosis     ICD-10-CM    1  Primary osteoarthritis of both knees M17 0    2  Complex tear of lateral meniscus of left knee as current injury, subsequent encounter J23 [id number]                   Subjective: Patient reports good tolerance to his LV  He notes he continues to have restrictions with performing stair negotiation however he is seeing improvements  Patient reports he continues to have stiffness and pain - particularly impacted by colder weather  Objective: See treatment diary below      Assessment: Patient completed documented program   Patient had good tolerance to manual stretching - improved ROM and minimal pain at end range  Patient demonstrated good form with exercises  He was challenged with 6" step ups however he maintained form throughout exercise  Patient had no complaints post treatment  Plan: Continue per plan of care  Progress treatment as tolerated          Daily Treatment Diary     DX: (L) knee arthroscopy - menisectomy, (B) knee OA  EPOC: 19  Precautions: standard  CO-MORBIDITES: hypertension  PERSONAL FACTORS: N/A    Manual  12/7 12/10        (L) Knee PROM 5'                                                     Exercise Diary  12/7 12/10        Recumbent Bike 5' 5'        SL Balance nv         SL Balance with reach - 5 Way future                   Gastroc stretch 15"x5 ea 30"x3        HR/TR 20 20        Std Hip Flex 20 20x ea        Std Hip ABD 10 ea 20x ea        Std Hip Ext 10 ea 20x ea        HS Curls  20 20x ea        Mini Squats 10 5"x10         FWD Mini Lunge          LAT Mini Lunge 10 ea 10x ea        FWD Step Up 4"x10 6"x10        LAT Step Up 4"x10 4"x10                   SLR 10x2 2x10        S/L Hip ABD 10x2 2x10        Bridge with WA Hip ABD 5"x10 5" 2x10                  Heel Slides  5"x10 5"x10 Modalities

## 2018-12-12 ENCOUNTER — OFFICE VISIT (OUTPATIENT)
Dept: PHYSICAL THERAPY | Facility: CLINIC | Age: 63
End: 2018-12-12
Payer: COMMERCIAL

## 2018-12-12 DIAGNOSIS — S83.272D COMPLEX TEAR OF LATERAL MENISCUS OF LEFT KNEE AS CURRENT INJURY, SUBSEQUENT ENCOUNTER: ICD-10-CM

## 2018-12-12 DIAGNOSIS — M17.0 PRIMARY OSTEOARTHRITIS OF BOTH KNEES: Primary | ICD-10-CM

## 2018-12-12 PROCEDURE — 97110 THERAPEUTIC EXERCISES: CPT | Performed by: PHYSICAL THERAPIST

## 2018-12-12 PROCEDURE — 97112 NEUROMUSCULAR REEDUCATION: CPT | Performed by: PHYSICAL THERAPIST

## 2018-12-12 NOTE — PROGRESS NOTES
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  He also notes his pain is impacted by the colder weather  Objective: See treatment diary below      Assessment: Patient completed documented program   Patient tolerated increased weightbearing exercises well  He continues to have discomfort with end range knee flexion ROM - modified range  Patient demonstrated improved ROM following manual stretching  Patient reported feeling fatigued post treatment but had no complaints of increased pain  Plan: Continue per plan of care  Progress treatment as tolerated          Daily Treatment Diary     DX: (L) knee arthroscopy - menisectomy, (B) knee OA  EPOC: 19  Precautions: standard  CO-MORBIDITES: hypertension  PERSONAL FACTORS: N/A    Manual  12/7 12/10 12/12       (L) Knee PROM 5'  5'                                                   Exercise Diary  12/7 12/10 12/12       Recumbent Bike 5' 5' 5'       SL Balance nv         SL Balance with reach - 5 Way future                   Gastroc stretch 15"x5 ea 30"x3 30"x3       HR/TR 20 20 20       Std Hip Flex 20 20x ea 20x ea       Std Hip ABD 10 ea 20x ea 20x ea       Std Hip Ext 10 ea 20x ea 20x ea       HS Curls  20 20x ea 20x ea       Mini Squats 10 5"x10  5"x10       FWD Mini Lunge   10x ea       LAT Mini Lunge 10 ea 10x ea 10x ea       FWD Step Up 4"x10 6"x10 6"x10       LAT Step Up 4"x10 4"x10  6"x10                 SLR 10x2 2x10 5"x10        S/L Hip ABD 10x2 2x10 5"x10       Bridge with MI Hip ABD 5"x10 5" 2x10 5" 2x10 Heel Slides  5"x10 5"x10 5"x10                               Modalities

## 2018-12-18 ENCOUNTER — OFFICE VISIT (OUTPATIENT)
Dept: PHYSICAL THERAPY | Facility: CLINIC | Age: 63
End: 2018-12-18
Payer: COMMERCIAL

## 2018-12-18 DIAGNOSIS — S83.272D COMPLEX TEAR OF LATERAL MENISCUS OF LEFT KNEE AS CURRENT INJURY, SUBSEQUENT ENCOUNTER: ICD-10-CM

## 2018-12-18 DIAGNOSIS — M17.0 PRIMARY OSTEOARTHRITIS OF BOTH KNEES: Primary | ICD-10-CM

## 2018-12-18 PROCEDURE — 97110 THERAPEUTIC EXERCISES: CPT

## 2018-12-18 PROCEDURE — 97112 NEUROMUSCULAR REEDUCATION: CPT

## 2018-12-18 NOTE — PROGRESS NOTES
Daily Note     Today's date: 2018  Patient name: Alhaji Eng  : 1955  MRN: [id number]  Referring provider: Lucia Zarco MD  Dx:   Encounter Diagnosis     ICD-10-CM    1  Primary osteoarthritis of both knees M17 0    2  Complex tear of lateral meniscus of left knee as current injury, subsequent encounter S83 272D        Start Time: 0730  Stop Time: 0820  Total time in clinic (min): 50 minutes    Subjective: Patient notes no pain at the start of the session, however continues to have slight pain and decreased confidence with descending the stairs  Objective: See treatment diary below      DX: (L) knee arthroscopy - menisectomy, (B) knee OA  EPOC: 19  Precautions: standard  CO-MORBIDITES: hypertension  PERSONAL FACTORS: N/A    Manual  12/7 12/10 12/12 12/18      (L) Knee PROM 5'  5' 5'                                                  Exercise Diary  12/7 12/10 12/12 12/18      Recumbent Bike 5' 5' 5' 5'      SL Balance nv         SL Balance with reach - 5 Way future                   Gastroc stretch 15"x5 ea 30"x3 30"x3 30"x3      HR/TR 20 20 20 20      Std Hip Flex 20 20x ea 20x ea 20x ea      Std Hip ABD 10 ea 20x ea 20x ea 20x ea      Std Hip Ext 10 ea 20x ea 20x ea 20x ea      HS Curls  20 20x ea 20x ea 20x ea      Mini Squats 10 5"x10  5"x10 5"x10      FWD Mini Lunge   10x ea 10x ea      LAT Mini Lunge 10 ea 10x ea 10x ea 10x ea      FWD Step Up 4"x10 6"x10 6"x10 6" x 10 ea      LAT Step Up 4"x10 4"x10  6"x10 6" x 10 ea      4" step overs    4" x 10 on L      SLR 10x2 2x10 5"x10  5"x10      S/L Hip ABD 10x2 2x10 5"x10 5"x10      Bridge with [place] Hip ABD 5"x10 5" 2x10 5" 2x10 5" 2x10                 Heel Slides  5"x10 5"x10 5"x10 5"x10                              Modalities                                                 Assessment: PT introduced step overs to assist c confidence and pain levels while descending the stairs at home   Patient performed with mild discomfort, however he noted it improved his confidence  PT educated the patient to use (B) rails at home until his confidence improves  Patient noted no increased pain throughout the session  Patient would benefit from continued PT to allow the patient to return to his PLOF  Plan: Continue per plan of care

## 2018-12-20 ENCOUNTER — OFFICE VISIT (OUTPATIENT)
Dept: PHYSICAL THERAPY | Facility: CLINIC | Age: 63
End: 2018-12-20
Payer: COMMERCIAL

## 2018-12-20 DIAGNOSIS — M17.0 PRIMARY OSTEOARTHRITIS OF BOTH KNEES: Primary | ICD-10-CM

## 2018-12-20 DIAGNOSIS — S83.272D COMPLEX TEAR OF LATERAL MENISCUS OF LEFT KNEE AS CURRENT INJURY, SUBSEQUENT ENCOUNTER: ICD-10-CM

## 2018-12-20 PROCEDURE — 97110 THERAPEUTIC EXERCISES: CPT | Performed by: PHYSICAL THERAPIST

## 2018-12-20 PROCEDURE — 97112 NEUROMUSCULAR REEDUCATION: CPT | Performed by: PHYSICAL THERAPIST

## 2018-12-20 NOTE — PROGRESS NOTES
Daily Note     Today's date: 2018  Patient name: Kimberlee Nicole  : 1955  MRN: [id number]  Referring provider: Madelyn Robles MD  Dx:   Encounter Diagnosis     ICD-10-CM    1  Primary osteoarthritis of both knees M17 0    2  Complex tear of lateral meniscus of left knee as current injury, subsequent encounter S88 493D                   Subjective: Patient reports good tolerance to his LV  He continues to have improved motion and pain levels post treatment  He notes he continues to have the most difficulty descending stairs - attributes to habit and knee recovery process  Objective: See treatment diary below    Assessment: Patient completed documented program   Weightbearing exercises were progressed with good overall tolerance  He was fatigued following squat exercise - transitioned to mat-based exercises  Patient reported less knee discomfort with step over exercise today - attributed to performing stretching prior to step exercise  Patient had no complaints of increased pain post treatment  Plan: Continue per plan of care       DX: (L) knee arthroscopy - menisectomy, (B) knee OA  EPOC: 19  Precautions: standard  CO-MORBIDITES: hypertension  PERSONAL FACTORS: N/A    Manual  12/7 12/10 12/12 12/18 12/20     (L) Knee PROM 5'  5' 5' 5'     [facility]                                            Exercise Diary  12/7 12/10 12/12 12/18 12/20     Recumbent Bike 5' 5' 5' 5' 5'     SL Balance nv    20"x3 ea     SL Balance with reach - 5 Way future                   Gastroc stretch 15"x5 ea 30"x3 30"x3 30"x3      HR/TR 20 20 20 20      Std Hip Flex 20 20x ea 20x ea 20x ea 20x ea     Std Hip ABD 10 ea 20x ea 20x ea 20x ea 20x ea     Std Hip Ext 10 ea 20x ea 20x ea 20x ea 20x ea     HS Curls  20 20x ea 20x ea 20x ea 20x ea     Mini Squats 10 5"x10  5"x10 5"x10 5"x10     FWD Mini Lunge   10x ea 10x ea      LAT Mini Lunge 10 ea 10x ea 10x ea 10x ea      FWD Step Up 4"x10 6"x10 6"x10 6" x 10 ea 6" 2x10     LAT Step Up 4"x10 4"x10  6"x10 6" x 10 ea 6" 2x10     4" step overs    4" x 10 on L 4"x10 (L)     SLR 10x2 2x10 5"x10  5"x10 5"x10      S/L Hip ABD 10x2 2x10 5"x10 5"x10 5"x10     Bridge with MA Hip ABD 5"x10 5" 2x10 5" 2x10 5" 2x10                 Heel Slides  5"x10 5"x10 5"x10 5"x10                              Modalities

## 2018-12-24 ENCOUNTER — OFFICE VISIT (OUTPATIENT)
Dept: PHYSICAL THERAPY | Facility: CLINIC | Age: 63
End: 2018-12-24
Payer: COMMERCIAL

## 2018-12-24 DIAGNOSIS — S83.272D COMPLEX TEAR OF LATERAL MENISCUS OF LEFT KNEE AS CURRENT INJURY, SUBSEQUENT ENCOUNTER: ICD-10-CM

## 2018-12-24 DIAGNOSIS — M17.0 PRIMARY OSTEOARTHRITIS OF BOTH KNEES: Primary | ICD-10-CM

## 2018-12-24 PROCEDURE — 97110 THERAPEUTIC EXERCISES: CPT

## 2018-12-24 PROCEDURE — 97530 THERAPEUTIC ACTIVITIES: CPT

## 2018-12-24 PROCEDURE — 97112 NEUROMUSCULAR REEDUCATION: CPT

## 2018-12-24 NOTE — PROGRESS NOTES
Daily Note     Today's date: 2018  Patient name: Spenser Joshi  : 1955  MRN: [id number]  Referring provider: Bella King MD  Dx:   Encounter Diagnosis     ICD-10-CM    1  Primary osteoarthritis of both knees M17 0    2  Complex tear of lateral meniscus of left knee as current injury, subsequent encounter S80 259D                   Subjective: Patient reports continued progress at work but is still having difficulty with going down steps  Reports he is now able to go up the stairs while carrying a basket full of laundry  Objective: See treatment diary below    Assessment: Patient completed documented program   Pt  continues to show improvement  Pt  tolerated progressions to TE's with minimal difficulty and some minor discomfort only during step ups and greater on step downs, but able to perform 8" down for first time today x5  Will continue to progress as tolerated and monitor pt  response  Plan: Continue per plan of care       DX: (L) knee arthroscopy - menisectomy, (B) knee OA  EPOC: 19  Precautions: standard  CO-MORBIDITES: hypertension  PERSONAL FACTORS: N/A    Manual  12/7 12/10 12/12 12/18 12/20 12/24    (L) Knee PROM 5'  5' 5' 5' 5'    IA[facility] (L) Quad                                            Exercise Diary  12/7 12/10 12/12 12/18 12/20 12/24    Recumbent Bike 5' 5' 5' 5' 5' 5'    SL Balance nv    20"x3 ea 20"x3    SL Balance with reach - 5 Way future                   Gastroc stretch 15"x5 ea 30"x3 30"x3 30"x3  30"x3    HR/TR 20 20 20 20      Std Hip Flex 20 20x ea 20x ea 20x ea 20x ea 2 5#  x20    Std Hip ABD 10 ea 20x ea 20x ea 20x ea 20x ea 2 5#  x20    Std Hip Ext 10 ea 20x ea 20x ea 20x ea 20x ea 2 5#  x20    HS Curls  20 20x ea 20x ea 20x ea 20x ea 2 5#  x20    Mini Squats 10 5"x10  5"x10 5"x10 5"x10     FWD Mini Lunge   10x ea 10x ea  10 ea    LAT Mini Lunge 10 ea 10x ea 10x ea 10x ea  10 ea    FWD Step Up 4"x10 6"x10 6"x10 6" x 10 ea 6" 2x10 8"x10    LAT Step Up 4"x10 4"x10  6"x10 6" x 10 ea 6" 2x10 8"x10    4" step overs    4" x 10 on L 4"x10 (L) 6"x5  8"x5    SLR 10x2 2x10 5"x10  5"x10 5"x10  5"x20    S/L Hip ABD 10x2 2x10 5"x10 5"x10 5"x10     Bridge with [place] Hip ABD 5"x10 5" 2x10 5" 2x10 5" 2x10   5"x20    LAQ      2 5#  x20 ea    Heel Slides  5"x10 5"x10 5"x10 5"x10                              Modalities

## 2018-12-26 ENCOUNTER — EVALUATION (OUTPATIENT)
Dept: PHYSICAL THERAPY | Facility: CLINIC | Age: 63
End: 2018-12-26
Payer: COMMERCIAL

## 2018-12-26 DIAGNOSIS — M17.0 PRIMARY OSTEOARTHRITIS OF BOTH KNEES: Primary | ICD-10-CM

## 2018-12-26 DIAGNOSIS — S83.272D COMPLEX TEAR OF LATERAL MENISCUS OF LEFT KNEE AS CURRENT INJURY, SUBSEQUENT ENCOUNTER: ICD-10-CM

## 2018-12-26 PROCEDURE — G8979 MOBILITY GOAL STATUS: HCPCS | Performed by: PHYSICAL THERAPIST

## 2018-12-26 PROCEDURE — 97110 THERAPEUTIC EXERCISES: CPT | Performed by: PHYSICAL THERAPIST

## 2018-12-26 PROCEDURE — 97112 NEUROMUSCULAR REEDUCATION: CPT | Performed by: PHYSICAL THERAPIST

## 2018-12-26 PROCEDURE — 97140 MANUAL THERAPY 1/> REGIONS: CPT | Performed by: PHYSICAL THERAPIST

## 2018-12-26 PROCEDURE — G8978 MOBILITY CURRENT STATUS: HCPCS | Performed by: PHYSICAL THERAPIST

## 2018-12-26 NOTE — PROGRESS NOTES
PT Evaluation     Today's date: 2018  Patient name: Nitesh Francois  : 1955  MRN: [id number]  Referring provider: Lynn Ferrera MD  Dx:   Encounter Diagnosis     ICD-10-CM    1  Primary osteoarthritis of both knees M17 0    2  Complex tear of lateral meniscus of left knee as current injury, subsequent encounter [id number]                   Assessment  Assessment details: Since starting PT patient displays with significant improvements in pain frequency, strength, ROM, and function  He continues to be primarily limited with descending stairs however he does see improvements overall with this task  Patient will continue to benefit from skilled PT to enhance strength and control with descending stairs  Patient will be seen for two additional visits with the goal of weaning to an I HEP as able  Thank you very much for your referral!  Impairments: abnormal gait, abnormal muscle tone, abnormal or restricted ROM, abnormal movement, activity intolerance, impaired balance, impaired physical strength, lacks appropriate home exercise program, pain with function and poor body mechanics  Understanding of Dx/Px/POC: good   Prognosis: good    Goals  ST  Decrease pain to 4/10 max in 3 weeks  2  Increase (L) knee AROM: 0-130 degrees in 3 weeks  - met  3  Increase (B) hip and (L) knee strength by 1/2 MMT grade in 3 weeks  - met  4  Improve SL balance to 10 seconds in 3 weeks  5  Improve hamstring flexibility by 5 degrees in 3 weeks  - met  6  I with HEP in 3 weeks  - met  7  Improve FOTO score to 56 in 3 weeks  - met       LTG  1  Decrease pain to 2/10 max in 6 weeks  2  Increase (L) knee AROM: 0-135 degrees in 6 weeks  - met  3  Increase (B) hip and (L) knee strength to 4+/5 all planes in 6 weeks  - met  4  Improve SL balance to 15 seconds in 6 weeks  5  Improve hamstring flexibility by 10 degrees in 6 weeks  6  I with HEP in 6 weeks  - met  7  Improve FOTO score to 63 in 6 weeks   - partially met      Plan  Patient would benefit from: skilled physical therapy  Planned modality interventions: cryotherapy and thermotherapy: hydrocollator packs  Planned therapy interventions: joint mobilization, manual therapy, neuromuscular re-education, patient education, strengthening, stretching, therapeutic activities, therapeutic exercise, home exercise program, body mechanics training, activity modification, functional ROM exercises, gait training and balance  Frequency: 2x week  Duration in visits: 12  Duration in weeks: 6  Plan of Care beginning date: 2018  Plan of Care expiration date: 2019  Treatment plan discussed with: patient        Subjective Evaluation    History of Present Illness  Mechanism of injury: Patient reports since starting PT he sees significant improvements in flexibility, pain levels, strength, and function  He reports his ambulation is much better however he remains limited with descending stairs  He also notes his balance has improved however remains limited  Quality of life: good    Pain  Current pain ratin  At best pain ratin  At worst pain ratin  Location: (L) Knee   Quality: dull ache and sharp    Social Support  Steps to enter house: yes  Stairs in house: yes   Lives in: multiple-level home    Employment status: working ( - Desk job with walking)    Diagnostic Tests  X-ray: abnormal (Osteoarthritis )  MRI studies: abnormal (Meniscus tear )  Treatments  Previous treatment: injection treatment  Patient Goals  Patient goals for therapy: decreased pain, improved balance, increased motion, increased strength and return to sport/leisure activities  Patient goal: Return to trout fishing         Objective     Static Posture   General Observations  Symmetrical weight bearing       Palpation     Additional Palpation Details  TTP (L) knee lateral joint line    Active Range of Motion   Left Knee   Flexion: 135 degrees   Extension: 0 degrees     Right Knee Flexion: 140 degrees   Extension: 0 degrees     Strength/Myotome Testing     Left Hip   Planes of Motion   Flexion: 4+  Extension: 4+  Abduction: 4+    Right Hip   Planes of Motion   Flexion: 4+  Extension: 4+  Abduction: 4+    Left Knee   Flexion: 4+  Extension: 4+    Right Knee   Flexion: 5  Extension: 4+    Tests     Additional Tests Details  90/90 HS Lag: (B) -30 degrees     Ambulation     Observational Gait   Walking speed, left stance time and right stance time within functional limits     Left arm swing: within functional limits  Right arm swing: within functional limits  Base of support: normal    Functional Assessment     Single Leg Stance   Left: 2 (Hip strategy) seconds  Right: 6 (Ankle strategy ) seconds    IASTM Technique: 12/26/18  Patient position: supine  Joint position: Knee extended, knee flexed  Treatment area:(L) Quad  Instruments/ Strokes used: GT4 - Sweeping       Treatment Diary:    DX: (L) knee arthroscopy - menisectomy, (B) knee OA  EPOC: 1/16/19  Precautions: standard  CO-MORBIDITES: hypertension  PERSONAL FACTORS: N/A    Manual  12/24 12/26        (L) Knee PROM 5' 5'        IASTM (L) Quad  5'                                          Exercise Diary  12/24 12/26        Recumbent Bike 5' 5'        SL Balance 20"x3         SL Balance with reach - 5 Way                    Gastroc stretch 30"x3         HR/TR          Std Hip Flex 2 5#  x20         Std Hip ABD 2 5#  x20         Std Hip Ext 2 5#  x20         HS Curls  2 5#  x20         Mini Squats          FWD Mini Lunge 10 ea         LAT Mini Lunge 10 ea         FWD Step Up 8"x10 6" 2x10        LAT Step Up 8"x10         4" step overs 6"x5  8"x5 6" 2x10        SLR 5"x20         S/L Hip ABD          Bridge with NM Hip ABD 5"x20         LAQ 2 5#  x20 ea         Heel Slides                                   Modalities

## 2018-12-31 ENCOUNTER — OFFICE VISIT (OUTPATIENT)
Dept: PHYSICAL THERAPY | Facility: CLINIC | Age: 63
End: 2018-12-31
Payer: COMMERCIAL

## 2018-12-31 DIAGNOSIS — M17.0 PRIMARY OSTEOARTHRITIS OF BOTH KNEES: Primary | ICD-10-CM

## 2018-12-31 DIAGNOSIS — S83.272D COMPLEX TEAR OF LATERAL MENISCUS OF LEFT KNEE AS CURRENT INJURY, SUBSEQUENT ENCOUNTER: ICD-10-CM

## 2018-12-31 PROCEDURE — 97140 MANUAL THERAPY 1/> REGIONS: CPT

## 2018-12-31 PROCEDURE — 97110 THERAPEUTIC EXERCISES: CPT

## 2018-12-31 PROCEDURE — 97112 NEUROMUSCULAR REEDUCATION: CPT

## 2018-12-31 NOTE — PROGRESS NOTES
Daily Note     Today's date: 2018  Patient name: Patrica Naylor  : 1955  MRN: [id number]  Referring provider: Jimmye Dakin, MD  Dx:   Encounter Diagnosis     ICD-10-CM    1  Primary osteoarthritis of both knees M17 0    2  Complex tear of lateral meniscus of left knee as current injury, subsequent encounter [id number]                   Subjective: Pt  Stated he continues to see small improvements daily and is feeling pretty good at this point  Steps remain greatest challenge at this point with normal ADL's  Objective: See treatment diary below      Assessment: Tolerated treatment well  Patient demonstrated fatigue post treatment, exhibited good technique with therapeutic exercises and would benefit from continued PT  Pt  Continues to progress and gain strength  Pain still felt with step ups and downs at 8 inches- Performed IASTM and PROM to L knee and performed 8" step ups/downs again with significant improvement felt  Plan: Continue per plan of care  Progress treatment as tolerated        Treatment Diary:    DX: (L) knee arthroscopy - menisectomy, (B) knee OA  EPOC: 19  Precautions: standard  CO-MORBIDITES: hypertension  PERSONAL FACTORS: N/A    Manual         (L) Knee PROM 5' 5' 5       IASTM (L) Quad  5' 5                                         Exercise Diary         Recumbent Bike 5' 5' 6'       SL Balance 20"x3  20"x5       SL Balance with reach - 5 Way                    Gastroc stretch 30"x3  30"x3       HR/TR          Std Hip Flex 2 5#  x20  2 5#  x20       Std Hip ABD 2 5#  x20  2 5#  x20       Std Hip Ext 2 5#  x20  2 5#  x20       HS Curls  2 5#  x20  2 5#  x20       Mini Squats   x20       FWD Mini Lunge 10 ea  np       LAT Mini Lunge 10 ea  10       FWD Step Up 8"x10 6" 2x10 8"  15       LAT Step Up 8"x10         4" step overs 6"x5  8"x5 6" 2x10 8"  15       SLR 5"x20  5"x20       S/L Hip ABD          Bridge with MN Hip ABD 5"x20  5"x20 LAQ 2 5#  x20 ea         Heel Slides                                   Modalities

## 2019-01-02 ENCOUNTER — OFFICE VISIT (OUTPATIENT)
Dept: PHYSICAL THERAPY | Facility: CLINIC | Age: 64
End: 2019-01-02
Payer: COMMERCIAL

## 2019-01-02 DIAGNOSIS — I10 ESSENTIAL HYPERTENSION: ICD-10-CM

## 2019-01-02 DIAGNOSIS — M17.0 PRIMARY OSTEOARTHRITIS OF BOTH KNEES: Primary | ICD-10-CM

## 2019-01-02 DIAGNOSIS — S83.272D COMPLEX TEAR OF LATERAL MENISCUS OF LEFT KNEE AS CURRENT INJURY, SUBSEQUENT ENCOUNTER: ICD-10-CM

## 2019-01-02 PROCEDURE — 97140 MANUAL THERAPY 1/> REGIONS: CPT

## 2019-01-02 PROCEDURE — 97112 NEUROMUSCULAR REEDUCATION: CPT

## 2019-01-02 PROCEDURE — G8980 MOBILITY D/C STATUS: HCPCS

## 2019-01-02 PROCEDURE — 97110 THERAPEUTIC EXERCISES: CPT

## 2019-01-02 PROCEDURE — G8979 MOBILITY GOAL STATUS: HCPCS

## 2019-01-02 RX ORDER — AMLODIPINE BESYLATE 5 MG/1
5 TABLET ORAL DAILY
Qty: 90 TABLET | Refills: 0 | Status: SHIPPED | OUTPATIENT
Start: 2019-01-02 | End: 2019-04-10 | Stop reason: SDUPTHER

## 2019-01-02 NOTE — PROGRESS NOTES
Daily Note / Discharge    Today's date: 2019  Patient name: Avinash Gonzalez  : 1955  MRN: [id number]  Referring provider: Chloé Olivares MD  Dx:   Encounter Diagnosis     ICD-10-CM    1  Primary osteoarthritis of both knees M17 0    2  Complex tear of lateral meniscus of left knee as current injury, subsequent encounter [id number]                   Subjective: Pt  Stated he continues to see small improvements daily and is feeling pretty good at this point  Steps are improving as he continues to work on his step over step performance at home  Objective: See treatment diary below      Assessment: Pt  has continued to progress and is ready to be d/c to Home Exercise Program today  Pt  now reports no pain at rest and minimal discomfort with activity  Pt  now improved to having no difficulty with normal ADL's due to condition being seen at PT for  Step over step for FF without pain remains pt 's biggest challenge at this time but had improved significantly  ROM has improve      Discussed TE's given on Home Exercise Program, which pt  showed competency in   Pt  Has met most impairment and functional goals    Plan: D/C to HEP    Treatment Diary:    DX: (L) knee arthroscopy - menisectomy, (B) knee OA  EPOC: 19  Precautions: standard  CO-MORBIDITES: hypertension  PERSONAL FACTORS: N/A    Manual        (L) Knee PROM 5' 5' 5 5      IASTM (L) Quad  5' 5 5                                        Exercise Diary   1/2      Recumbent Bike 5' 5' 6' 8'      SL Balance 20"x3  20"x5 20"x5      SL Balance with reach - 5 Way                    Gastroc stretch 30"x3  30"x3 30"x3      HR/TR          Std Hip Flex 2 5#  x20  2 5#  x20       Std Hip ABD 2 5#  x20  2 5#  x20       Std Hip Ext 2 5#  x20  2 5#  x20       HS Curls  2 5#  x20  2 5#  x20       Mini Squats   x20       FWD Mini Lunge 10 ea  np       LAT Mini Lunge 10 ea  10       FWD Step Up 8"x10 6" 2x10 8"  15 8"x15 LAT Step Up 8"x10   8"x15      4" step overs 6"x5  8"x5 6" 2x10 8"  15 8"x15      SLR 5"x20  5"x20 2#  10x2      S/L Hip ABD    2#  10x2      Bridge with SD Hip ABD 5"x20  5"x20 5"x20      LAQ 2 5#  x20 ea         Heel Slides                                   Modalities

## 2019-01-08 ENCOUNTER — OFFICE VISIT (OUTPATIENT)
Dept: OBGYN CLINIC | Facility: CLINIC | Age: 64
End: 2019-01-08

## 2019-01-08 VITALS
DIASTOLIC BLOOD PRESSURE: 65 MMHG | SYSTOLIC BLOOD PRESSURE: 130 MMHG | HEIGHT: 68 IN | WEIGHT: 241 LBS | BODY MASS INDEX: 36.53 KG/M2

## 2019-01-08 DIAGNOSIS — M17.0 PRIMARY OSTEOARTHRITIS OF BOTH KNEES: Primary | ICD-10-CM

## 2019-01-08 PROCEDURE — 99024 POSTOP FOLLOW-UP VISIT: CPT | Performed by: ORTHOPAEDIC SURGERY

## 2019-01-08 NOTE — ASSESSMENT & PLAN NOTE
Patient is doing very well status post left knee arthroscopy with partial lateral meniscectomy and loose body removal 6 weeks ago  He feels significant improvement with physical therapy  Discussed possible use of viscosupplementation injections in the future if his left knee continues to bother him  Discussed importance of continuing low-impact exercises  He will follow up in the future if he wants to consider these injections  All questions were answered to patient's satisfaction  Plan discussed with Dr Padmini Arias

## 2019-01-08 NOTE — PROGRESS NOTES
Assessment:     1  Primary osteoarthritis of both knees          Plan:     Problem List Items Addressed This Visit        Musculoskeletal and Integument    Primary osteoarthritis of both knees - Primary     Patient is doing very well status post left knee arthroscopy with partial lateral meniscectomy and loose body removal 6 weeks ago  He feels significant improvement with physical therapy  Discussed possible use of viscosupplementation injections in the future if his left knee continues to bother him  Discussed importance of continuing low-impact exercises  He will follow up in the future if he wants to consider these injections  All questions were answered to patient's satisfaction  Plan discussed with Dr Francesca Samuel  Patient ID: Joe Mendez is a 70 y o  male  Chief Complaint:  Postop check    Subjective:  57-year-old male status post left knee arthroscopy with lateral meniscus debridement and multiple loose body removals on November 19, 2018  He was found to have significant trochlear as well as lateral femoral condyle articular deficits  He attended physical therapy for the past few weeks  He feels significant improvement in his range of motion and strength  He only has discomfort when descending stairs at this point  He is back doing his normal activities  Allergy:  No Known Allergies    Medications:  all current active meds have been reviewed    ROS:  Review of Systems   Musculoskeletal: Positive for arthralgias, joint swelling and myalgias  All other systems reviewed and are negative  Objective:  BP Readings from Last 1 Encounters:   01/08/19 130/65      Wt Readings from Last 1 Encounters:   01/08/19 109 kg (241 lb)        Exam:   Physical Exam   Musculoskeletal:        Left knee: He exhibits no effusion       Right Knee Exam   Right knee exam is normal     Comments:  Patellofemoral crepitation      Left Knee Exam     Range of Motion   Extension: normal   Flexion: 140     Muscle Strength     The patient has normal left knee strength      Other   Erythema: absent  Scars: present (Healed incisions with no evidence of infection)  Sensation: normal  Pulse: present  Swelling: mild  Effusion: no effusion present    Comments:  ,stable to varus and valgus stress, no calf tenderness, no ankle swelling

## 2019-03-18 DIAGNOSIS — I10 ESSENTIAL HYPERTENSION: ICD-10-CM

## 2019-03-18 RX ORDER — HYDROCHLOROTHIAZIDE 25 MG/1
25 TABLET ORAL DAILY
Qty: 90 TABLET | Refills: 3 | Status: SHIPPED | OUTPATIENT
Start: 2019-03-18 | End: 2020-05-12 | Stop reason: SDUPTHER

## 2019-04-10 DIAGNOSIS — I10 ESSENTIAL HYPERTENSION: ICD-10-CM

## 2019-04-10 RX ORDER — AMLODIPINE BESYLATE 5 MG/1
5 TABLET ORAL DAILY
Qty: 90 TABLET | Refills: 0 | Status: SHIPPED | OUTPATIENT
Start: 2019-04-10 | End: 2019-07-07 | Stop reason: SDUPTHER

## 2019-04-12 ENCOUNTER — OFFICE VISIT (OUTPATIENT)
Dept: FAMILY MEDICINE CLINIC | Facility: HOSPITAL | Age: 64
End: 2019-04-12
Payer: COMMERCIAL

## 2019-04-12 VITALS
WEIGHT: 236 LBS | HEIGHT: 68 IN | DIASTOLIC BLOOD PRESSURE: 68 MMHG | SYSTOLIC BLOOD PRESSURE: 132 MMHG | BODY MASS INDEX: 35.77 KG/M2 | TEMPERATURE: 97.8 F | HEART RATE: 76 BPM

## 2019-04-12 DIAGNOSIS — I10 ESSENTIAL HYPERTENSION: ICD-10-CM

## 2019-04-12 DIAGNOSIS — E78.5 DYSLIPIDEMIA: ICD-10-CM

## 2019-04-12 DIAGNOSIS — Z12.5 SCREENING FOR PROSTATE CANCER: ICD-10-CM

## 2019-04-12 DIAGNOSIS — M17.0 PRIMARY OSTEOARTHRITIS OF BOTH KNEES: Primary | ICD-10-CM

## 2019-04-12 DIAGNOSIS — G47.61 PERIODIC LIMB MOVEMENT DISORDER: ICD-10-CM

## 2019-04-12 PROCEDURE — 3078F DIAST BP <80 MM HG: CPT | Performed by: INTERNAL MEDICINE

## 2019-04-12 PROCEDURE — 3008F BODY MASS INDEX DOCD: CPT | Performed by: INTERNAL MEDICINE

## 2019-04-12 PROCEDURE — 99214 OFFICE O/P EST MOD 30 MIN: CPT | Performed by: INTERNAL MEDICINE

## 2019-04-12 PROCEDURE — 3075F SYST BP GE 130 - 139MM HG: CPT | Performed by: INTERNAL MEDICINE

## 2019-04-12 PROCEDURE — 1036F TOBACCO NON-USER: CPT | Performed by: INTERNAL MEDICINE

## 2019-04-22 ENCOUNTER — TELEPHONE (OUTPATIENT)
Dept: HEMATOLOGY ONCOLOGY | Facility: HOSPITAL | Age: 64
End: 2019-04-22

## 2019-04-24 ENCOUNTER — TELEPHONE (OUTPATIENT)
Dept: FAMILY MEDICINE CLINIC | Facility: HOSPITAL | Age: 64
End: 2019-04-24

## 2019-06-07 ENCOUNTER — APPOINTMENT (OUTPATIENT)
Dept: LAB | Facility: HOSPITAL | Age: 64
End: 2019-06-07
Payer: COMMERCIAL

## 2019-06-07 LAB
ALBUMIN SERPL BCP-MCNC: 3.9 G/DL (ref 3.5–5)
ALP SERPL-CCNC: 39 U/L (ref 46–116)
ALT SERPL W P-5'-P-CCNC: 21 U/L (ref 12–78)
ANION GAP SERPL CALCULATED.3IONS-SCNC: 10 MMOL/L (ref 4–13)
AST SERPL W P-5'-P-CCNC: 17 U/L (ref 5–45)
BILIRUB SERPL-MCNC: 1.4 MG/DL (ref 0.2–1)
BUN SERPL-MCNC: 22 MG/DL (ref 5–25)
CALCIUM SERPL-MCNC: 9 MG/DL (ref 8.3–10.1)
CHLORIDE SERPL-SCNC: 105 MMOL/L (ref 100–108)
CHOLEST SERPL-MCNC: 200 MG/DL (ref 50–200)
CO2 SERPL-SCNC: 27 MMOL/L (ref 21–32)
CREAT SERPL-MCNC: 1.2 MG/DL (ref 0.6–1.3)
GFR SERPL CREATININE-BSD FRML MDRD: 63 ML/MIN/1.73SQ M
GLUCOSE P FAST SERPL-MCNC: 97 MG/DL (ref 65–99)
HDLC SERPL-MCNC: 42 MG/DL (ref 40–60)
LDLC SERPL CALC-MCNC: 141 MG/DL (ref 0–100)
NONHDLC SERPL-MCNC: 158 MG/DL
POTASSIUM SERPL-SCNC: 3.6 MMOL/L (ref 3.5–5.3)
PROT SERPL-MCNC: 7.2 G/DL (ref 6.4–8.2)
PSA SERPL-MCNC: 0.5 NG/ML (ref 0–4)
SODIUM SERPL-SCNC: 142 MMOL/L (ref 136–145)
TRIGL SERPL-MCNC: 86 MG/DL
TSH SERPL DL<=0.05 MIU/L-ACNC: 1.63 UIU/ML (ref 0.36–3.74)

## 2019-06-07 PROCEDURE — 80061 LIPID PANEL: CPT | Performed by: INTERNAL MEDICINE

## 2019-06-07 PROCEDURE — G0103 PSA SCREENING: HCPCS | Performed by: INTERNAL MEDICINE

## 2019-06-07 PROCEDURE — 84443 ASSAY THYROID STIM HORMONE: CPT | Performed by: INTERNAL MEDICINE

## 2019-06-07 PROCEDURE — 36415 COLL VENOUS BLD VENIPUNCTURE: CPT | Performed by: INTERNAL MEDICINE

## 2019-06-07 PROCEDURE — 80053 COMPREHEN METABOLIC PANEL: CPT | Performed by: INTERNAL MEDICINE

## 2019-06-27 ENCOUNTER — OFFICE VISIT (OUTPATIENT)
Dept: FAMILY MEDICINE CLINIC | Facility: HOSPITAL | Age: 64
End: 2019-06-27
Payer: COMMERCIAL

## 2019-06-27 VITALS
SYSTOLIC BLOOD PRESSURE: 124 MMHG | TEMPERATURE: 97.8 F | DIASTOLIC BLOOD PRESSURE: 82 MMHG | HEART RATE: 69 BPM | HEIGHT: 68 IN | WEIGHT: 237 LBS | BODY MASS INDEX: 35.92 KG/M2

## 2019-06-27 DIAGNOSIS — E78.5 DYSLIPIDEMIA: Primary | ICD-10-CM

## 2019-06-27 DIAGNOSIS — C85.91 LYMPHOMA OF LYMPH NODES OF HEAD AND NECK REGION (HCC): ICD-10-CM

## 2019-06-27 DIAGNOSIS — I10 ESSENTIAL HYPERTENSION: ICD-10-CM

## 2019-06-27 DIAGNOSIS — R17 ELEVATED BILIRUBIN: ICD-10-CM

## 2019-06-27 PROCEDURE — 1036F TOBACCO NON-USER: CPT | Performed by: INTERNAL MEDICINE

## 2019-06-27 PROCEDURE — 3079F DIAST BP 80-89 MM HG: CPT | Performed by: INTERNAL MEDICINE

## 2019-06-27 PROCEDURE — 3074F SYST BP LT 130 MM HG: CPT | Performed by: INTERNAL MEDICINE

## 2019-06-27 PROCEDURE — 99214 OFFICE O/P EST MOD 30 MIN: CPT | Performed by: INTERNAL MEDICINE

## 2019-06-27 PROCEDURE — 3008F BODY MASS INDEX DOCD: CPT | Performed by: INTERNAL MEDICINE

## 2019-07-07 DIAGNOSIS — I10 ESSENTIAL HYPERTENSION: ICD-10-CM

## 2019-07-14 RX ORDER — AMLODIPINE BESYLATE 5 MG/1
TABLET ORAL
Qty: 90 TABLET | Refills: 0 | Status: SHIPPED | OUTPATIENT
Start: 2019-07-14 | End: 2019-10-09 | Stop reason: SDUPTHER

## 2019-08-17 DIAGNOSIS — I10 ESSENTIAL HYPERTENSION: ICD-10-CM

## 2019-08-18 RX ORDER — LOSARTAN POTASSIUM 25 MG/1
25 TABLET ORAL DAILY
Qty: 90 TABLET | Refills: 2 | Status: SHIPPED | OUTPATIENT
Start: 2019-08-18 | End: 2020-07-09 | Stop reason: SDUPTHER

## 2019-10-09 DIAGNOSIS — I10 ESSENTIAL HYPERTENSION: ICD-10-CM

## 2019-10-09 RX ORDER — AMLODIPINE BESYLATE 5 MG/1
TABLET ORAL
Qty: 90 TABLET | Refills: 1 | Status: SHIPPED | OUTPATIENT
Start: 2019-10-09 | End: 2020-04-10 | Stop reason: SDUPTHER

## 2019-10-25 ENCOUNTER — APPOINTMENT (OUTPATIENT)
Dept: LAB | Facility: HOSPITAL | Age: 64
End: 2019-10-25
Payer: COMMERCIAL

## 2019-10-25 DIAGNOSIS — C85.91 LYMPHOMA OF LYMPH NODES OF HEAD AND NECK REGION (HCC): ICD-10-CM

## 2019-10-25 LAB
ALBUMIN SERPL BCP-MCNC: 3.7 G/DL (ref 3.5–5)
ALP SERPL-CCNC: 38 U/L (ref 46–116)
ALT SERPL W P-5'-P-CCNC: 27 U/L (ref 12–78)
ANION GAP SERPL CALCULATED.3IONS-SCNC: 9 MMOL/L (ref 4–13)
AST SERPL W P-5'-P-CCNC: 20 U/L (ref 5–45)
BASOPHILS # BLD AUTO: 0.05 THOUSANDS/ΜL (ref 0–0.1)
BASOPHILS NFR BLD AUTO: 1 % (ref 0–1)
BILIRUB SERPL-MCNC: 0.7 MG/DL (ref 0.2–1)
BUN SERPL-MCNC: 22 MG/DL (ref 5–25)
CALCIUM SERPL-MCNC: 8.6 MG/DL (ref 8.3–10.1)
CHLORIDE SERPL-SCNC: 106 MMOL/L (ref 100–108)
CO2 SERPL-SCNC: 27 MMOL/L (ref 21–32)
CREAT SERPL-MCNC: 1.05 MG/DL (ref 0.6–1.3)
EOSINOPHIL # BLD AUTO: 0.1 THOUSAND/ΜL (ref 0–0.61)
EOSINOPHIL NFR BLD AUTO: 2 % (ref 0–6)
ERYTHROCYTE [DISTWIDTH] IN BLOOD BY AUTOMATED COUNT: 13.4 % (ref 11.6–15.1)
GFR SERPL CREATININE-BSD FRML MDRD: 74 ML/MIN/1.73SQ M
GLUCOSE P FAST SERPL-MCNC: 92 MG/DL (ref 65–99)
HCT VFR BLD AUTO: 48.7 % (ref 36.5–49.3)
HGB BLD-MCNC: 16.1 G/DL (ref 12–17)
IMM GRANULOCYTES # BLD AUTO: 0.01 THOUSAND/UL (ref 0–0.2)
IMM GRANULOCYTES NFR BLD AUTO: 0 % (ref 0–2)
LYMPHOCYTES # BLD AUTO: 1.55 THOUSANDS/ΜL (ref 0.6–4.47)
LYMPHOCYTES NFR BLD AUTO: 29 % (ref 14–44)
MCH RBC QN AUTO: 29.9 PG (ref 26.8–34.3)
MCHC RBC AUTO-ENTMCNC: 33.1 G/DL (ref 31.4–37.4)
MCV RBC AUTO: 90 FL (ref 82–98)
MONOCYTES # BLD AUTO: 0.4 THOUSAND/ΜL (ref 0.17–1.22)
MONOCYTES NFR BLD AUTO: 7 % (ref 4–12)
NEUTROPHILS # BLD AUTO: 3.33 THOUSANDS/ΜL (ref 1.85–7.62)
NEUTS SEG NFR BLD AUTO: 61 % (ref 43–75)
NRBC BLD AUTO-RTO: 0 /100 WBCS
PLATELET # BLD AUTO: 178 THOUSANDS/UL (ref 149–390)
PMV BLD AUTO: 10.7 FL (ref 8.9–12.7)
POTASSIUM SERPL-SCNC: 3.9 MMOL/L (ref 3.5–5.3)
PROT SERPL-MCNC: 6.9 G/DL (ref 6.4–8.2)
RBC # BLD AUTO: 5.39 MILLION/UL (ref 3.88–5.62)
SODIUM SERPL-SCNC: 142 MMOL/L (ref 136–145)
WBC # BLD AUTO: 5.44 THOUSAND/UL (ref 4.31–10.16)

## 2019-10-25 PROCEDURE — 85025 COMPLETE CBC W/AUTO DIFF WBC: CPT

## 2019-10-25 PROCEDURE — 80053 COMPREHEN METABOLIC PANEL: CPT

## 2019-10-25 PROCEDURE — 36415 COLL VENOUS BLD VENIPUNCTURE: CPT

## 2019-10-28 ENCOUNTER — TELEPHONE (OUTPATIENT)
Dept: HEMATOLOGY ONCOLOGY | Facility: CLINIC | Age: 64
End: 2019-10-28

## 2019-10-28 NOTE — TELEPHONE ENCOUNTER
Patient went to [facility] for [facility]work and they would not do the LD because it said body fluid after it?  Please call patient to see if this was correct or not

## 2019-10-29 ENCOUNTER — TELEPHONE (OUTPATIENT)
Dept: HEMATOLOGY ONCOLOGY | Facility: CLINIC | Age: 64
End: 2019-10-29

## 2019-10-29 ENCOUNTER — APPOINTMENT (OUTPATIENT)
Dept: LAB | Facility: HOSPITAL | Age: 64
End: 2019-10-29
Payer: COMMERCIAL

## 2019-10-29 ENCOUNTER — TRANSCRIBE ORDERS (OUTPATIENT)
Dept: ADMINISTRATIVE | Facility: HOSPITAL | Age: 64
End: 2019-10-29

## 2019-10-29 DIAGNOSIS — C85.91 LYMPHOSARCOMA OF LYMPH NODES OF HEAD, FACE, AND NECK (HCC): ICD-10-CM

## 2019-10-29 DIAGNOSIS — D64.9 ANEMIA, UNSPECIFIED TYPE: Primary | ICD-10-CM

## 2019-10-29 DIAGNOSIS — C85.91 LYMPHOSARCOMA OF LYMPH NODES OF HEAD, FACE, AND NECK (HCC): Primary | ICD-10-CM

## 2019-10-29 LAB
LDH FLD L TO P-CCNC: 186 U/L
LDH SERPL-CCNC: 217 U/L (ref 81–234)

## 2019-10-29 PROCEDURE — 36415 COLL VENOUS BLD VENIPUNCTURE: CPT

## 2019-10-29 PROCEDURE — 83615 LACTATE (LD) (LDH) ENZYME: CPT

## 2019-10-29 NOTE — TELEPHONE ENCOUNTER
Sanchez from [facility] infusion called about Labs for this patient  She said that he has a 1 year f/u appt coming up with Dr Brian Horowitz and he came into the lab to have his bloodwork drawn  There is an order for a LDH-body fluid in his chart that the lab will not draw because they said it is an incorrect order  They need the order to be changed to LD blood  The patient is at the lab right now  If you could add this lab so that he can have this drawn today, it would be helpful

## 2019-11-04 ENCOUNTER — OFFICE VISIT (OUTPATIENT)
Dept: HEMATOLOGY ONCOLOGY | Facility: HOSPITAL | Age: 64
End: 2019-11-04
Payer: COMMERCIAL

## 2019-11-04 VITALS
BODY MASS INDEX: 36.22 KG/M2 | RESPIRATION RATE: 16 BRPM | OXYGEN SATURATION: 97 % | WEIGHT: 239 LBS | HEIGHT: 68 IN | HEART RATE: 70 BPM | SYSTOLIC BLOOD PRESSURE: 148 MMHG | DIASTOLIC BLOOD PRESSURE: 88 MMHG | TEMPERATURE: 98 F

## 2019-11-04 DIAGNOSIS — C85.91 LYMPHOMA OF LYMPH NODES OF HEAD AND NECK REGION (HCC): Primary | ICD-10-CM

## 2019-11-04 PROCEDURE — 99214 OFFICE O/P EST MOD 30 MIN: CPT | Performed by: INTERNAL MEDICINE

## 2019-11-04 NOTE — PROGRESS NOTES
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    HPI:    Mark Zafar is a pleasant 70year old   male, who presents to the office today unaccompanied for today's visit  He notes his disease was discovered by his dentist Dr Carlos Collins, at a routine dental exam   He had a biopsy, states surgery by oral surgeon  He then had 6 cycles of R-CHOP  He concluded treatment on 12/5/11  Radiation was administered by Dr Rah Valdes, and he states it concluded 2/29/12  Previous Hematologic/ Oncologic History:    Biopsy, followed by R-CHOP chemo x 6 cycles, ending 12/5/11  Dr Rah Valdes administered XRT, ending 2/29/12  Current Hematologic/ Oncologic Treatment:    Observation    Interval History:    Patient returns for follow-up visit  He is doing very well  He is retiring in a few weeks  Really denies any complaints today  Denies any nausea denies any vomiting denies any diarrhea  The rest of his 14 point review of systems today was negative  His blood work is all within acceptable limits  Has no clinical signs or symptoms of recurrence  Test Results:    Imaging: No results found      Labs:   Lab Results   Component Value Date    WBC 5 44 10/25/2019    HGB 16 1 10/25/2019    HCT 48 7 10/25/2019    MCV 90 10/25/2019     10/25/2019     Lab Results   Component Value Date     09/18/2015    K 3 9 10/25/2019     10/25/2019    CO2 27 10/25/2019    ANIONGAP 9 09/18/2015    BUN 22 10/25/2019    CREATININE 1 05 10/25/2019    GLUCOSE 148 (H) 09/18/2015    GLUF 92 10/25/2019    CALCIUM 8 6 10/25/2019    AST 20 10/25/2019    ALT 27 10/25/2019    ALKPHOS 38 (L) 10/25/2019    PROT 7 3 09/18/2015    BILITOT 0 66 09/18/2015    EGFR 74 10/25/2019       Lab Results   Component Value Date    PSA 0 5 06/07/2019    PSA 0 4 05/29/2018    PSA 0 4 02/16/2017     ROS: As stated in the history of present illness otherwise his 14 point review of systems today was negative  Active Problems:   Patient Active Problem List   Diagnosis    Apnea    Dyslipidemia    Hypertension    Intermittent claudication ([facility] [address]    Lymphoma, head, face, or neck    Macular degeneration    Obesity    Serrated adenoma of colon    Umbilical hernia    Primary osteoarthritis of both knees       Past Medical History:   Past Medical History:   Diagnosis Date    Cancer ([facility] [address] )     Hypertension 2016       Surgical History:   Past Surgical History:   Procedure Laterality Date    COLONOSCOPY  01/18/2016    complete - serrted adenoma    CYST REMOVAL      DENTAL SURGERY  2017    Root canals due to cancer    INCISION AND DRAINAGE ABSCESS / HEMATOMA OF BURSA / KNEE / THIGH  02/09/2015    rigjt upper, inner arm abscess    PORTACATH PLACEMENT  2011    And removed      TN KNEE SCOPE,MED/LAT MENISECTOMY Left 11/19/2018    Procedure: ARTHROSCOPY KNEE PARTIAL  LATERAL MENISECTOMY;  Surgeon: John Bailey MD;  Location: [facility] OR;  Service: Orthopedics    TONSILLECTOMY      As [facility] VASECTOMY  1978       Family History:    Family History   Problem Relation Age of Onset    Atrial fibrillation Mother     COPD Father     Rheumatic fever Father     Glaucoma Father     Thyroid disease Sister     Cancer Brother     Hypertension Son     Cancer Brother     Diverticulitis Son        Cancer-related family history includes Cancer in his brother and brother      Social History:   Social History     Socioeconomic History    Marital status: /Civil Union     Spouse name: Not on file    Number of children: Not on file    Years of education: Not on file    Highest education level: Not on file   Occupational History     Comment: full time employment    Social Needs    Financial resource strain: Not on file    Food insecurity:     Worry: Not on file Inability: Not on file    Transportation needs:     Medical: Not on file     Non-medical: Not on file   Tobacco Use    Smoking status: Never Smoker    Smokeless tobacco: Never Used   Substance and Sexual Activity    Alcohol use: Yes     Comment: 1 x week for 9 months/ year    Drug use: No    Sexual activity: Never     Comment: Resides with Unknown Stacks (Wife)   Lifestyle    Physical activity:     Days per week: Not on file     Minutes per session: Not on file    Stress: Not on file   Relationships    Social connections:     Talks on phone: Not on file     Gets together: Not on file     Attends Caodaism service: Not on file     Active member of club or organization: Not on file     Attends meetings of clubs or organizations: Not on file     Relationship status: Not on file    Intimate partner violence:     Fear of current or ex partner: Not on file     Emotionally abused: Not on file     Physically abused: Not on file     Forced sexual activity: Not on file   Other Topics Concern    Not on file   Social History Narrative           Current Medications:   Current Outpatient Medications   Medication Sig Dispense Refill    amLODIPine (NORVASC) 5 mg tablet TAKE 1 TABLET BY MOUTH EVERY DAY 90 tablet 1    losartan (COZAAR) 25 mg tablet TAKE 1 TABLET (25 MG TOTAL) BY MOUTH DAILY FOR 90 DAYS 90 tablet 2    hydrochlorothiazide (HYDRODIURIL) 25 mg tablet Take 1 tablet (25 mg total) by mouth daily for 90 days 90 tablet 3     No current facility-administered medications for this visit  Allergies: No Known Allergies    Physical Exam:    Body surface area is 2 2 meters squared      Wt Readings from Last 3 Encounters:   11/04/19 108 kg (239 lb)   06/27/19 108 kg (237 lb)   04/12/19 107 kg (236 lb)        Temp Readings from Last 3 Encounters:   11/04/19 98 °F (36 7 °C) (Oral)   06/27/19 97 8 °F (36 6 °C)   04/12/19 97 8 °F (36 6 °C)        BP Readings from Last 3 Encounters:   11/04/19 148/88   06/27/19 124/82 04/12/19 132/68         Pulse Readings from Last 3 Encounters:   11/04/19 70   06/27/19 69   04/12/19 76        Physical Exam     Constitutional   General appearance: No acute distress, well appearing and well nourished  Eyes   Conjunctiva and lids: No swelling, erythema or discharge  Pupils and irises: Equal, round and reactive to light  Ears, Nose, Mouth, and Throat   External inspection of ears and nose: Normal     Nasal mucosa, septum, and turbinates: Normal without edema or erythema  Oropharynx: Normal with no erythema, edema, exudate or lesions  Pulmonary   Respiratory effort: No increased work of breathing or signs of respiratory distress  Auscultation of lungs: Clear to auscultation  Cardiovascular   Palpation of heart: Normal PMI, no thrills  Auscultation of heart: Normal rate and rhythm, normal S1 and S2, without murmurs  Examination of extremities for edema and/or varicosities: Normal     Carotid pulses: Normal     Abdomen   Abdomen: Non-tender, no masses  Liver and spleen: No hepatomegaly or splenomegaly  Lymphatic   Palpation of lymph nodes in neck: No lymphadenopathy  Musculoskeletal   Gait and station: Normal     Digits and nails: Normal without clubbing or cyanosis  Inspection/palpation of joints, bones, and muscles: Normal     Skin   Skin and subcutaneous tissue: Normal without rashes or lesions  Neurologic   Cranial nerves: Cranial nerves 2-12 intact  Sensation: No sensory loss  Psychiatric   Orientation to person, place, and time: Normal     Mood and affect: Normal         Assessment / Plan:    The patient is a pleasant 78-year-old male who was referred to see us initially for diffuse large B-cell lymphoma  His last treatment was in 2012  He is doing very well  Has no signs or symptoms of recurrence  Since it has been 7 years since he was treated I have advised him to follow with his primary care physician at this point   They will probably monitoring his CBC with differential and CMP along with an LDH as part of his routine blood work  If any of these numbers are changing or if he has any clinical signs or symptoms of recurrence he can come back to see us and we can consider imaging  Otherwise he will just follow with his PCP  The patient is in agreement with the plan  He will see me as needed  Goals and Barriers:  Current Goal:  Prolong Survival from Diffuse large B-cell lymphoma  Barriers: None  Patient's Capacity to Self Care:  Patient able to self care  Portions of the record may have been created with voice recognition software   Occasional wrong word or "sound a like" substitutions may have occurred due to the inherent limitations of voice recognition software   Read the chart carefully and recognize, using context, where substitutions have occurred

## 2019-11-18 ENCOUNTER — OCCMED (OUTPATIENT)
Dept: URGENT CARE | Facility: CLINIC | Age: 64
End: 2019-11-18
Payer: OTHER MISCELLANEOUS

## 2019-11-18 DIAGNOSIS — S61.402A: ICD-10-CM

## 2019-11-18 DIAGNOSIS — Z23 NEED FOR TETANUS BOOSTER: Primary | ICD-10-CM

## 2019-11-18 PROCEDURE — G0382 LEV 3 HOSP TYPE B ED VISIT: HCPCS | Performed by: PHYSICIAN ASSISTANT

## 2019-11-18 PROCEDURE — 99283 EMERGENCY DEPT VISIT LOW MDM: CPT | Performed by: PHYSICIAN ASSISTANT

## 2019-11-21 ENCOUNTER — APPOINTMENT (OUTPATIENT)
Dept: URGENT CARE | Facility: CLINIC | Age: 64
End: 2019-11-21
Payer: OTHER MISCELLANEOUS

## 2019-11-21 PROCEDURE — 99213 OFFICE O/P EST LOW 20 MIN: CPT | Performed by: FAMILY MEDICINE

## 2019-12-11 ENCOUNTER — APPOINTMENT (OUTPATIENT)
Dept: LAB | Facility: HOSPITAL | Age: 64
End: 2019-12-11
Payer: MEDICARE

## 2019-12-11 DIAGNOSIS — R17 ELEVATED BILIRUBIN: ICD-10-CM

## 2019-12-11 LAB
ALBUMIN SERPL BCP-MCNC: 4.2 G/DL (ref 3.5–5)
ALP SERPL-CCNC: 48 U/L (ref 46–116)
ALT SERPL W P-5'-P-CCNC: 22 U/L (ref 12–78)
AST SERPL W P-5'-P-CCNC: 14 U/L (ref 5–45)
BILIRUB DIRECT SERPL-MCNC: 0.18 MG/DL (ref 0–0.2)
BILIRUB SERPL-MCNC: 0.7 MG/DL (ref 0.2–1)
PROT SERPL-MCNC: 7.7 G/DL (ref 6.4–8.2)

## 2019-12-11 PROCEDURE — 80076 HEPATIC FUNCTION PANEL: CPT

## 2019-12-11 PROCEDURE — 36415 COLL VENOUS BLD VENIPUNCTURE: CPT

## 2019-12-16 ENCOUNTER — OFFICE VISIT (OUTPATIENT)
Dept: FAMILY MEDICINE CLINIC | Facility: HOSPITAL | Age: 64
End: 2019-12-16
Payer: MEDICARE

## 2019-12-16 VITALS
HEIGHT: 68 IN | DIASTOLIC BLOOD PRESSURE: 92 MMHG | BODY MASS INDEX: 35.61 KG/M2 | TEMPERATURE: 97.8 F | WEIGHT: 235 LBS | HEART RATE: 78 BPM | SYSTOLIC BLOOD PRESSURE: 136 MMHG

## 2019-12-16 DIAGNOSIS — Z00.00 WELCOME TO MEDICARE PREVENTIVE VISIT: ICD-10-CM

## 2019-12-16 DIAGNOSIS — C85.91 LYMPHOMA OF LYMPH NODES OF HEAD AND NECK REGION (HCC): ICD-10-CM

## 2019-12-16 DIAGNOSIS — Z12.5 SCREENING FOR PROSTATE CANCER: ICD-10-CM

## 2019-12-16 DIAGNOSIS — Z23 NEED FOR INFLUENZA VACCINATION: ICD-10-CM

## 2019-12-16 DIAGNOSIS — M17.0 PRIMARY OSTEOARTHRITIS OF BOTH KNEES: ICD-10-CM

## 2019-12-16 DIAGNOSIS — Z23 NEED FOR PNEUMOCOCCAL VACCINATION: ICD-10-CM

## 2019-12-16 DIAGNOSIS — I10 ESSENTIAL HYPERTENSION: Primary | ICD-10-CM

## 2019-12-16 DIAGNOSIS — E78.5 DYSLIPIDEMIA: ICD-10-CM

## 2019-12-16 DIAGNOSIS — Z13.29 SCREENING FOR THYROID DISORDER: ICD-10-CM

## 2019-12-16 PROCEDURE — G0008 ADMIN INFLUENZA VIRUS VAC: HCPCS | Performed by: INTERNAL MEDICINE

## 2019-12-16 PROCEDURE — 90662 IIV NO PRSV INCREASED AG IM: CPT | Performed by: INTERNAL MEDICINE

## 2019-12-16 PROCEDURE — 99214 OFFICE O/P EST MOD 30 MIN: CPT | Performed by: INTERNAL MEDICINE

## 2019-12-16 PROCEDURE — G0009 ADMIN PNEUMOCOCCAL VACCINE: HCPCS | Performed by: INTERNAL MEDICINE

## 2019-12-16 PROCEDURE — 90670 PCV13 VACCINE IM: CPT | Performed by: INTERNAL MEDICINE

## 2019-12-16 PROCEDURE — G0402 INITIAL PREVENTIVE EXAM: HCPCS | Performed by: INTERNAL MEDICINE

## 2019-12-16 NOTE — ASSESSMENT & PLAN NOTE
Bp still a bit labile and just above goal, urged low sodium diet/exercise/wgt loss, con't current meds for now, check BW in June and re-eval at that time - if still elevated will increase Losartan

## 2019-12-16 NOTE — PROGRESS NOTES
Assessment and Plan:     Problem List Items Addressed This Visit        Cardiovascular and Mediastinum    Hypertension - Primary     Bp still a bit labile and just above goal, urged low sodium diet/exercise/wgt loss, con't current meds for now, check BW in June and re-eval at that time - if still elevated will increase Losartan         Relevant Orders    CBC and differential    Comprehensive metabolic panel    TSH, 3rd generation with Free T4 reflex    LD,Blood       Immune and Lymphatic    Lymphoma, head, face, or neck     Has been dismissed from [place] for f/u with check CBC/CMP/LDH annually with labs, call with night sweats/wgt loss/adenopathy         Relevant Orders    CBC and differential    Comprehensive metabolic panel    TSH, 3rd generation with Free T4 reflex    LD,Blood       Other    Dyslipidemia    Relevant Orders    Lipid panel    Primary osteoarthritis of both knees     Con't B/L Knee pain - stable, does not use anything as pain primarily intermittent, encouraged regular exercise/wgt loss/ice and Tylenol prn, call with new/worse symptoms         Relevant Orders    CBC and differential    Comprehensive metabolic panel    TSH, 3rd generation with Free T4 reflex    LD,Blood      Other Visit Diagnoses     Welcome to Medicare preventive visit        Relevant Orders    CBC and differential    Comprehensive metabolic panel    TSH, 3rd generation with Free T4 reflex    LD,Blood    Screening for prostate cancer        Relevant Orders    PSA, Total Screen    Screening for thyroid disorder        Relevant Orders    TSH, 3rd generation with Free T4 reflex           Preventive health issues were discussed with patient, and age appropriate screening tests were ordered as noted in patient's After Visit Summary  Personalized health advice and appropriate referrals for health education or preventive services given if needed, as noted in patient's After Visit Summary       History of Present Illness:     Patient presents for Welcome to Medicare visit  Patient Care Team:  Mayito Otero DO as PCP - General     Review of Systems:     Review of Systems   Constitutional: Negative for chills, fatigue, fever and unexpected weight change  HENT: Positive for hearing loss  Negative for congestion and sinus pain  Eyes: Negative for pain and visual disturbance  Respiratory: Positive for cough  Negative for shortness of breath and wheezing  Cardiovascular: Negative for chest pain, palpitations and leg swelling  Gastrointestinal: Negative for abdominal pain, blood in stool, constipation, diarrhea, nausea and vomiting  Endocrine: Negative for polydipsia and polyuria  Genitourinary: Negative for difficulty urinating and dysuria  Musculoskeletal: Positive for arthralgias  Negative for myalgias  Skin: Negative for rash and wound  Neurological: Negative for dizziness and headaches  Hematological: Does not bruise/bleed easily  Psychiatric/Behavioral: Negative for behavioral problems, confusion and dysphoric mood        Problem List:     Patient Active Problem List   Diagnosis    Apnea    Dyslipidemia    Hypertension    Intermittent [address]audication ([facility] [address]    Lymphoma, head, face, or neck    Macular degeneration    Obesity    Serrated adenoma of colon    Umbilical hernia    Primary osteoarthritis of both knees      Past Medical and Surgical History:     Past Medical History:   Diagnosis Date    Cancer ([address] )     Hypertension 2016     Past Surgical History:   Procedure Laterality Date    COLONOSCOPY  01/18/2016    complete - serrted adenoma    CYST REMOVAL      DENTAL SURGERY  2017    Root canals due to cancer    INCISION AND DRAINAGE ABSCESS / HEMATOMA OF BURSA / KNEE / THIGH  02/09/2015    rigjt upper, inner arm abscess    PORTACATH PLACEMENT  2011    And removed      [place] KNEE SCOPE,MED/LAT MENISECTOMY Left 11/19/2018    Procedure: ARTHROSCOPY KNEE PARTIAL  LATERAL MENISECTOMY;  Surgeon: Kena Coats MD; Location: [facility] OR;  Service: Orthopedics    TONSILLECTOMY      As kid   [facility] VASECTOMY  1978      Family History:     Family History   Problem Relation Age of Onset    Atrial fibrillation Mother     COPD Father     Rheumatic fever Father     Glaucoma Father     Thyroid disease Sister     Cancer Brother     Hypertension Son     Cancer Brother     Diverticulitis Son       Social History:     Social History     Socioeconomic History    Marital status: /Civil Union     Spouse name: None    Number of children: None    Years of education: None    Highest education level: None   Occupational History     Comment: full time employment    Social Needs    Financial resource strain: None    Food insecurity:     Worry: None     Inability: None    Transportation needs:     Medical: None     Non-medical: None   Tobacco Use    Smoking status: Never Smoker    Smokeless tobacco: Never Used   Substance and Sexual Activity    Alcohol use: Yes     Comment: 1 x week for 9 months/ year    Drug use: No    Sexual activity: Never     Comment: Resides with Carol Fisher (Wife)   Lifestyle    Physical activity:     Days per week: None     Minutes per session: None    Stress: None   Relationships    Social connections:     Talks on phone: None     Gets together: None     Attends Alevism service: None     Active member of club or organization: None     Attends meetings of clubs or organizations: None     Relationship status: None    Intimate partner violence:     Fear of current or ex partner: None     Emotionally abused: None     Physically abused: None     Forced sexual activity: None   Other Topics Concern    None   Social History Narrative          Medications and Allergies:     Current Outpatient Medications   Medication Sig Dispense Refill    amLODIPine (NORVASC) 5 mg tablet TAKE 1 TABLET BY MOUTH EVERY DAY 90 tablet 1    hydrochlorothiazide (HYDRODIURIL) 25 mg tablet Take 1 tablet (25 mg total) by mouth daily for 90 days 90 tablet 3    losartan (COZAAR) 25 mg tablet TAKE 1 TABLET (25 MG TOTAL) BY MOUTH DAILY FOR 90 DAYS 90 tablet 2     No current facility-administered medications for this visit  No Known Allergies   Immunizations:     Immunization History   Administered Date(s) Administered    Influenza, injectable, quadrivalent, preservative free 0 5 mL 10/03/2018    Tdap 01/22/2016, 05/07/2016      Health Maintenance:         Topic Date Due    Hepatitis C Screening  1955    CRC Screening: Colonoscopy  01/18/2026         Topic Date Due    Pneumococcal Vaccine: 65+ Years (1 of 2 - PCV13) 10/22/2018    Influenza Vaccine  07/01/2019      Medicare Screening Tests and Risk Assessments:     Jamar Cross is here for his Welcome to Medicare visit  Health Risk Assessment:   Patient rates overall health as good  Patient feels that their physical health rating is same  Eyesight was rated as same  Hearing was rated as slightly worse  Patient feels that their emotional and mental health rating is same  Pain experienced in the last 7 days has been none  Patient states that he has experienced no weight loss or gain in last 6 months  Hearing slightly worse - he has not had a formal hearing test and is deferring at this time    Depression Screening:   PHQ-2 Score: 0      Fall Risk Screening: In the past year, patient has experienced: no history of falling in past year      Home Safety:  Patient has trouble with stairs inside or outside of their home  Patient has working smoke alarms and has working carbon monoxide detector  Home safety hazards include: none  Nutrition:   Current diet is Regular, Frequent junk food and Unhealthy  Medications:   Patient is not currently taking any over-the-counter supplements  Patient is able to manage medications       Activities of Daily Living (ADLs)/Instrumental Activities of Daily Living (IADLs):   Walk and transfer into and out of bed and chair?: Yes  Dress and groom yourself?: Yes    Bathe or shower yourself?: Yes    Feed yourself? Yes  Do your laundry/housekeeping?: Yes  Manage your money, pay your bills and track your expenses?: Yes  Make your own meals?: Yes    Do your own shopping?: Yes    Previous Hospitalizations:   Any hospitalizations or ED visits within the last 12 months?: No      Advance Care Planning:   Living will: Yes    Durable POA for healthcare: Yes    Advanced directive: Yes    Advanced directive counseling given: Yes      Cognitive Screening:   Provider or family/friend/caregiver concerned regarding cognition?: No    PREVENTIVE SCREENINGS      Cardiovascular Screening:    General: Screening Current and Risks and Benefits Discussed      Diabetes Screening:     General: Screening Current and Risks and Benefits Discussed      Colorectal Cancer Screening:     General: Screening Current      Prostate Cancer Screening:    General: Screening Current and Risks and Benefits Discussed      Osteoporosis Screening:    General: Risks and Benefits Discussed and Screening Not Indicated      Abdominal Aortic Aneurysm (AAA) Screening:    Risk factors include: age between 73-69 yo        General: Risks and Benefits Discussed and Screening Not Indicated      Lung Cancer Screening:     General: Risks and Benefits Discussed and Screening Not Indicated      Hepatitis C Screening:    General: Risks and Benefits Discussed and Patient Declines    Hep C Screening Accepted: No     Other Counseling Topics:   Car/seat belt/driving safety, sunscreen and regular weightbearing exercise  Visual Acuity Screening    Right eye Left eye Both eyes   Without correction:      With correction: 20/25 20/40 20/25        Physical Exam:     /92   Pulse 78   Temp 97 8 °F (36 6 °C)   Ht 5' 8" (1 727 m)   Wt 107 kg (235 lb)   BMI 35 73 kg/m²     Physical Exam   Constitutional: He appears well-developed and well-nourished  No distress  HENT:   Head: Normocephalic and atraumatic  Right Ear: External ear normal    Left Ear: External ear normal    Mouth/Throat: Oropharynx is clear and moist  No oropharyngeal exudate  Eyes: Conjunctivae are normal  Right eye exhibits no discharge  Left eye exhibits no discharge  Neck: Neck supple  No tracheal deviation present  Cardiovascular: Normal rate, regular rhythm and normal heart sounds  No murmur heard  Neg carotid bruits B/L   Pulmonary/Chest: Effort normal and breath sounds normal  No respiratory distress  He has no wheezes  He has no rales  Abdominal: Soft  He exhibits no distension  There is no tenderness  There is no rebound and no guarding  Musculoskeletal: He exhibits no deformity  Lymphadenopathy:     He has no cervical adenopathy  Neurological: He is alert  He exhibits normal muscle tone  Skin: Skin is warm and dry  No rash noted  Psychiatric: He has a normal mood and affect  His behavior is normal    Nursing note and vitals reviewed        Inessa Moreno DO

## 2019-12-16 NOTE — ASSESSMENT & PLAN NOTE
Con't B/L Knee pain - stable, does not use anything as pain primarily intermittent, encouraged regular exercise/wgt loss/ice and Tylenol prn, call with new/worse symptoms

## 2019-12-16 NOTE — PATIENT INSTRUCTIONS

## 2019-12-16 NOTE — PROGRESS NOTES
Assessment/Plan:    Hypertension  Bp still a bit labile and just above goal, urged low sodium diet/exercise/wgt loss, con't current meds for now, check BW in June and re-eval at that time - if still elevated will increase Losartan    Lymphoma, head, face, or neck  Has been dismissed from [place] for f/u with check CBC/CMP/LDH annually with labs, call with night sweats/wgt loss/adenopathy    Primary osteoarthritis of both knees  Con't B/L Knee pain - stable, does not use anything as pain primarily intermittent, encouraged regular exercise/wgt loss/ice and Tylenol prn, call with new/worse symptoms       Diagnoses and all orders for this visit:    Essential hypertension  -     CBC and differential  -     Comprehensive metabolic panel  -     TSH, 3rd generation with Free T4 reflex  -     LD,Blood    Lymphoma, head, face, or neck  -     CBC and differential  -     Comprehensive metabolic panel  -     TSH, 3rd generation with Free T4 reflex  -     LD,Blood    Primary osteoarthritis of both knees  -     CBC and differential  -     Comprehensive metabolic panel  -     TSH, 3rd generation with Free T4 reflex  -     LD,Blood    Welcome to Medicare preventive visit  -     CBC and differential  -     Comprehensive metabolic panel  -     TSH, 3rd generation with Free T4 reflex  -     LD,Blood    Screening for prostate cancer  -     PSA, Total Screen    Screening for thyroid disorder  -     TSH, 3rd generation with Free T4 reflex    Dyslipidemia  -     Lipid panel      Colonoscopy 1/16 - 10 yrs    BW 6/19    Retiring 12/21/19    Subjective:      Patient ID: Raymond Farnsworth is a 75 y o  male  HPI Pt here for follow up appt and AWV    BP above goal again today and meds were reviewed and no changes have occurred  He denies missing doses of meds or SE with the meds  He does not check his BP outside the office  He notes no frequent HA's/dizziness/double vision/CP  Pt saw Dr Juan Jose Miller for f/u B cell lymphoma in Nov 2019    He was told to con't follow up with PCP with CBC/CMP/LDH  He denies any masses/lumps/unintentional wgt loss/night sweats  He con't to have B/L knee pain  He is going to start doing Silver Sneakers which he hopes will help as he has had benefit with exercise in the past   He notes no use of OTC meds as the pain is intermittent  Colonoscopy 1/16 - 10 yrs    BW 6/19    Retiring 12/21/19    Review of Systems   Constitutional: Negative for chills and fever  HENT: Positive for hearing loss  Negative for congestion and sinus pain  Eyes: Negative for pain and visual disturbance  Respiratory: Positive for cough  Negative for shortness of breath and wheezing  Cardiovascular: Negative for chest pain, palpitations and leg swelling  Gastrointestinal: Negative for abdominal pain, blood in stool, constipation, diarrhea, nausea and vomiting  Endocrine: Negative for polydipsia and polyuria  Genitourinary: Negative for difficulty urinating, dysuria and hematuria  Musculoskeletal: Positive for arthralgias  Negative for joint swelling and myalgias  Skin: Negative for rash and wound  Neurological: Negative for dizziness and headaches  Hematological: Does not bruise/bleed easily  Psychiatric/Behavioral: Negative for behavioral problems and confusion  Objective:    /92   Pulse 78   Temp 97 8 °F (36 6 °C)   Ht 5' 8" (1 727 m)   Wt 107 kg (235 lb)   BMI 35 73 kg/m²      Physical Exam   Constitutional: He appears well-developed and well-nourished  No distress  HENT:   Head: Normocephalic and atraumatic  Right Ear: External ear normal    Left Ear: External ear normal    Mouth/Throat: Oropharynx is clear and moist  No oropharyngeal exudate  Eyes: Conjunctivae are normal  Right eye exhibits no discharge  Left eye exhibits no discharge  Neck: Neck supple  No tracheal deviation present  Cardiovascular: Normal rate, regular rhythm and normal heart sounds  No murmur heard    Neg carotid bruits B/L   Pulmonary/Chest: Effort normal and breath sounds normal  No respiratory distress  He has no wheezes  He has no rales  Abdominal: Soft  He exhibits no distension  There is no tenderness  There is no rebound and no guarding  Musculoskeletal: He exhibits no deformity  Lymphadenopathy:     He has no cervical adenopathy  Neurological: He is alert  He exhibits normal muscle tone  Skin: Skin is warm and dry  No rash noted  Psychiatric: He has a normal mood and affect  His behavior is normal    Nursing note and vitals reviewed

## 2019-12-16 NOTE — ASSESSMENT & PLAN NOTE
Has been dismissed from [place] for f/u with check CBC/CMP/LDH annually with labs, call with night sweats/wgt loss/adenopathy

## 2020-04-10 DIAGNOSIS — I10 ESSENTIAL HYPERTENSION: ICD-10-CM

## 2020-04-10 RX ORDER — AMLODIPINE BESYLATE 5 MG/1
5 TABLET ORAL DAILY
Qty: 90 TABLET | Refills: 1 | Status: SHIPPED | OUTPATIENT
Start: 2020-04-10 | End: 2021-03-07 | Stop reason: SDUPTHER

## 2020-05-12 DIAGNOSIS — I10 ESSENTIAL HYPERTENSION: ICD-10-CM

## 2020-05-13 RX ORDER — HYDROCHLOROTHIAZIDE 25 MG/1
25 TABLET ORAL DAILY
Qty: 90 TABLET | Refills: 1 | Status: SHIPPED | OUTPATIENT
Start: 2020-05-13 | End: 2020-12-17

## 2020-06-09 ENCOUNTER — APPOINTMENT (OUTPATIENT)
Dept: LAB | Facility: HOSPITAL | Age: 65
End: 2020-06-09
Payer: COMMERCIAL

## 2020-06-09 LAB
ALBUMIN SERPL BCP-MCNC: 4.1 G/DL (ref 3.5–5)
ALP SERPL-CCNC: 43 U/L (ref 46–116)
ALT SERPL W P-5'-P-CCNC: 23 U/L (ref 12–78)
ANION GAP SERPL CALCULATED.3IONS-SCNC: 5 MMOL/L (ref 4–13)
AST SERPL W P-5'-P-CCNC: 19 U/L (ref 5–45)
BASOPHILS # BLD AUTO: 0.05 THOUSANDS/ΜL (ref 0–0.1)
BASOPHILS NFR BLD AUTO: 1 % (ref 0–1)
BILIRUB SERPL-MCNC: 1.24 MG/DL (ref 0.2–1)
BUN SERPL-MCNC: 17 MG/DL (ref 5–25)
CALCIUM SERPL-MCNC: 8.6 MG/DL (ref 8.3–10.1)
CHLORIDE SERPL-SCNC: 103 MMOL/L (ref 100–108)
CHOLEST SERPL-MCNC: 210 MG/DL (ref 50–200)
CO2 SERPL-SCNC: 28 MMOL/L (ref 21–32)
CREAT SERPL-MCNC: 1.05 MG/DL (ref 0.6–1.3)
EOSINOPHIL # BLD AUTO: 0.14 THOUSAND/ΜL (ref 0–0.61)
EOSINOPHIL NFR BLD AUTO: 3 % (ref 0–6)
ERYTHROCYTE [DISTWIDTH] IN BLOOD BY AUTOMATED COUNT: 13.6 % (ref 11.6–15.1)
GFR SERPL CREATININE-BSD FRML MDRD: 74 ML/MIN/1.73SQ M
GLUCOSE P FAST SERPL-MCNC: 105 MG/DL (ref 65–99)
HCT VFR BLD AUTO: 47.7 % (ref 36.5–49.3)
HDLC SERPL-MCNC: 45 MG/DL
HGB BLD-MCNC: 15.9 G/DL (ref 12–17)
IMM GRANULOCYTES # BLD AUTO: 0.01 THOUSAND/UL (ref 0–0.2)
IMM GRANULOCYTES NFR BLD AUTO: 0 % (ref 0–2)
LDH SERPL-CCNC: 180 U/L (ref 81–234)
LDLC SERPL CALC-MCNC: 150 MG/DL (ref 0–100)
LYMPHOCYTES # BLD AUTO: 1.68 THOUSANDS/ΜL (ref 0.6–4.47)
LYMPHOCYTES NFR BLD AUTO: 30 % (ref 14–44)
MCH RBC QN AUTO: 30.1 PG (ref 26.8–34.3)
MCHC RBC AUTO-ENTMCNC: 33.3 G/DL (ref 31.4–37.4)
MCV RBC AUTO: 90 FL (ref 82–98)
MONOCYTES # BLD AUTO: 0.52 THOUSAND/ΜL (ref 0.17–1.22)
MONOCYTES NFR BLD AUTO: 9 % (ref 4–12)
NEUTROPHILS # BLD AUTO: 3.2 THOUSANDS/ΜL (ref 1.85–7.62)
NEUTS SEG NFR BLD AUTO: 57 % (ref 43–75)
NONHDLC SERPL-MCNC: 165 MG/DL
NRBC BLD AUTO-RTO: 0 /100 WBCS
PLATELET # BLD AUTO: 175 THOUSANDS/UL (ref 149–390)
PMV BLD AUTO: 10.2 FL (ref 8.9–12.7)
POTASSIUM SERPL-SCNC: 3.6 MMOL/L (ref 3.5–5.3)
PROT SERPL-MCNC: 7.1 G/DL (ref 6.4–8.2)
PSA SERPL-MCNC: 0.5 NG/ML (ref 0–4)
RBC # BLD AUTO: 5.28 MILLION/UL (ref 3.88–5.62)
SODIUM SERPL-SCNC: 136 MMOL/L (ref 136–145)
TRIGL SERPL-MCNC: 75 MG/DL
TSH SERPL DL<=0.05 MIU/L-ACNC: 2.1 UIU/ML (ref 0.36–3.74)
WBC # BLD AUTO: 5.6 THOUSAND/UL (ref 4.31–10.16)

## 2020-06-09 PROCEDURE — 80061 LIPID PANEL: CPT | Performed by: INTERNAL MEDICINE

## 2020-06-09 PROCEDURE — 85025 COMPLETE CBC W/AUTO DIFF WBC: CPT | Performed by: INTERNAL MEDICINE

## 2020-06-09 PROCEDURE — 36415 COLL VENOUS BLD VENIPUNCTURE: CPT | Performed by: INTERNAL MEDICINE

## 2020-06-09 PROCEDURE — 83615 LACTATE (LD) (LDH) ENZYME: CPT | Performed by: INTERNAL MEDICINE

## 2020-06-09 PROCEDURE — 80053 COMPREHEN METABOLIC PANEL: CPT | Performed by: INTERNAL MEDICINE

## 2020-06-09 PROCEDURE — 84443 ASSAY THYROID STIM HORMONE: CPT | Performed by: INTERNAL MEDICINE

## 2020-06-09 PROCEDURE — G0103 PSA SCREENING: HCPCS | Performed by: INTERNAL MEDICINE

## 2020-06-16 ENCOUNTER — OFFICE VISIT (OUTPATIENT)
Dept: FAMILY MEDICINE CLINIC | Facility: HOSPITAL | Age: 65
End: 2020-06-16
Payer: COMMERCIAL

## 2020-06-16 VITALS
BODY MASS INDEX: 35.31 KG/M2 | SYSTOLIC BLOOD PRESSURE: 130 MMHG | WEIGHT: 233 LBS | DIASTOLIC BLOOD PRESSURE: 72 MMHG | HEART RATE: 72 BPM | HEIGHT: 68 IN | TEMPERATURE: 96.8 F

## 2020-06-16 DIAGNOSIS — I10 ESSENTIAL HYPERTENSION: ICD-10-CM

## 2020-06-16 DIAGNOSIS — E66.01 SEVERE OBESITY (BMI 35.0-39.9) WITH COMORBIDITY (HCC): ICD-10-CM

## 2020-06-16 DIAGNOSIS — R22.9 LUMP OF SKIN: ICD-10-CM

## 2020-06-16 DIAGNOSIS — E78.5 DYSLIPIDEMIA: ICD-10-CM

## 2020-06-16 DIAGNOSIS — C85.91 LYMPHOMA OF LYMPH NODES OF HEAD AND NECK REGION (HCC): ICD-10-CM

## 2020-06-16 DIAGNOSIS — R73.9 HYPERGLYCEMIA: Primary | ICD-10-CM

## 2020-06-16 PROCEDURE — 3008F BODY MASS INDEX DOCD: CPT | Performed by: INTERNAL MEDICINE

## 2020-06-16 PROCEDURE — 3075F SYST BP GE 130 - 139MM HG: CPT | Performed by: INTERNAL MEDICINE

## 2020-06-16 PROCEDURE — 3078F DIAST BP <80 MM HG: CPT | Performed by: INTERNAL MEDICINE

## 2020-06-16 PROCEDURE — 1036F TOBACCO NON-USER: CPT | Performed by: INTERNAL MEDICINE

## 2020-06-16 PROCEDURE — 1160F RVW MEDS BY RX/DR IN RCRD: CPT | Performed by: INTERNAL MEDICINE

## 2020-06-16 PROCEDURE — 4040F PNEUMOC VAC/ADMIN/RCVD: CPT | Performed by: INTERNAL MEDICINE

## 2020-06-16 PROCEDURE — 99214 OFFICE O/P EST MOD 30 MIN: CPT | Performed by: INTERNAL MEDICINE

## 2020-06-16 RX ORDER — ATORVASTATIN CALCIUM 20 MG/1
20 TABLET, FILM COATED ORAL DAILY
Qty: 30 TABLET | Refills: 5 | Status: SHIPPED | OUTPATIENT
Start: 2020-06-16 | End: 2020-10-07

## 2020-07-09 DIAGNOSIS — I10 ESSENTIAL HYPERTENSION: ICD-10-CM

## 2020-07-09 RX ORDER — LOSARTAN POTASSIUM 25 MG/1
25 TABLET ORAL DAILY
Qty: 90 TABLET | Refills: 1 | Status: SHIPPED | OUTPATIENT
Start: 2020-07-09 | End: 2021-01-15

## 2020-10-07 DIAGNOSIS — E78.5 DYSLIPIDEMIA: ICD-10-CM

## 2020-10-07 RX ORDER — ATORVASTATIN CALCIUM 20 MG/1
TABLET, FILM COATED ORAL
Qty: 90 TABLET | Refills: 1 | Status: SHIPPED | OUTPATIENT
Start: 2020-10-07 | End: 2021-04-15

## 2020-10-09 ENCOUNTER — IMMUNIZATIONS (OUTPATIENT)
Dept: FAMILY MEDICINE CLINIC | Facility: HOSPITAL | Age: 65
End: 2020-10-09
Payer: COMMERCIAL

## 2020-10-09 DIAGNOSIS — Z23 ENCOUNTER FOR IMMUNIZATION: ICD-10-CM

## 2020-10-09 PROCEDURE — G0008 ADMIN INFLUENZA VIRUS VAC: HCPCS | Performed by: FAMILY MEDICINE

## 2020-10-09 PROCEDURE — 90662 IIV NO PRSV INCREASED AG IM: CPT | Performed by: FAMILY MEDICINE

## 2020-12-17 DIAGNOSIS — I10 ESSENTIAL HYPERTENSION: ICD-10-CM

## 2020-12-17 RX ORDER — HYDROCHLOROTHIAZIDE 25 MG/1
TABLET ORAL
Qty: 90 TABLET | Refills: 1 | Status: SHIPPED | OUTPATIENT
Start: 2020-12-17 | End: 2021-07-18

## 2020-12-24 ENCOUNTER — TELEPHONE (OUTPATIENT)
Dept: FAMILY MEDICINE CLINIC | Facility: HOSPITAL | Age: 65
End: 2020-12-24

## 2020-12-24 DIAGNOSIS — B34.9 VIRAL INFECTION, UNSPECIFIED: ICD-10-CM

## 2020-12-24 DIAGNOSIS — Z20.822 EXPOSURE TO COVID-19 VIRUS: ICD-10-CM

## 2020-12-24 PROCEDURE — U0003 INFECTIOUS AGENT DETECTION BY NUCLEIC ACID (DNA OR RNA); SEVERE ACUTE RESPIRATORY SYNDROME CORONAVIRUS 2 (SARS-COV-2) (CORONAVIRUS DISEASE [COVID-19]), AMPLIFIED PROBE TECHNIQUE, MAKING USE OF HIGH THROUGHPUT TECHNOLOGIES AS DESCRIBED BY CMS-2020-01-R: HCPCS | Performed by: FAMILY MEDICINE

## 2020-12-25 LAB — SARS-COV-2 RNA SPEC QL NAA+PROBE: NOT DETECTED

## 2020-12-28 ENCOUNTER — TELEMEDICINE (OUTPATIENT)
Dept: FAMILY MEDICINE CLINIC | Facility: HOSPITAL | Age: 65
End: 2020-12-28
Payer: COMMERCIAL

## 2020-12-28 VITALS — TEMPERATURE: 98.5 F

## 2020-12-28 DIAGNOSIS — B34.9 VIRAL INFECTION, UNSPECIFIED: ICD-10-CM

## 2020-12-28 DIAGNOSIS — B34.9 VIRAL INFECTION, UNSPECIFIED: Primary | ICD-10-CM

## 2020-12-28 PROCEDURE — 99213 OFFICE O/P EST LOW 20 MIN: CPT | Performed by: FAMILY MEDICINE

## 2020-12-28 PROCEDURE — U0003 INFECTIOUS AGENT DETECTION BY NUCLEIC ACID (DNA OR RNA); SEVERE ACUTE RESPIRATORY SYNDROME CORONAVIRUS 2 (SARS-COV-2) (CORONAVIRUS DISEASE [COVID-19]), AMPLIFIED PROBE TECHNIQUE, MAKING USE OF HIGH THROUGHPUT TECHNOLOGIES AS DESCRIBED BY CMS-2020-01-R: HCPCS | Performed by: FAMILY MEDICINE

## 2020-12-28 PROCEDURE — 1160F RVW MEDS BY RX/DR IN RCRD: CPT | Performed by: FAMILY MEDICINE

## 2020-12-28 PROCEDURE — 1036F TOBACCO NON-USER: CPT | Performed by: FAMILY MEDICINE

## 2020-12-29 DIAGNOSIS — U07.1 COVID-19: Primary | ICD-10-CM

## 2020-12-29 LAB — SARS-COV-2 RNA SPEC QL NAA+PROBE: DETECTED

## 2020-12-29 RX ORDER — SODIUM CHLORIDE 9 MG/ML
20 INJECTION, SOLUTION INTRAVENOUS ONCE
Status: CANCELLED | OUTPATIENT
Start: 2020-12-30

## 2020-12-29 RX ORDER — ALBUTEROL SULFATE 90 UG/1
3 AEROSOL, METERED RESPIRATORY (INHALATION) ONCE AS NEEDED
Status: CANCELLED | OUTPATIENT
Start: 2020-12-30

## 2020-12-29 RX ORDER — ACETAMINOPHEN 325 MG/1
650 TABLET ORAL ONCE AS NEEDED
Status: CANCELLED | OUTPATIENT
Start: 2020-12-30

## 2020-12-30 ENCOUNTER — HOSPITAL ENCOUNTER (OUTPATIENT)
Dept: INFUSION CENTER | Facility: HOSPITAL | Age: 65
Discharge: HOME | End: 2020-12-30
Payer: COMMERCIAL

## 2020-12-30 VITALS
TEMPERATURE: 96.9 F | SYSTOLIC BLOOD PRESSURE: 127 MMHG | RESPIRATION RATE: 18 BRPM | DIASTOLIC BLOOD PRESSURE: 71 MMHG | HEART RATE: 65 BPM | OXYGEN SATURATION: 96 %

## 2020-12-30 DIAGNOSIS — U07.1 COVID-19: Primary | ICD-10-CM

## 2020-12-30 PROCEDURE — M0239 BAMLANIVIMAB-XXXX INFUSION: HCPCS | Performed by: FAMILY MEDICINE

## 2020-12-30 RX ORDER — ALBUTEROL SULFATE 90 UG/1
3 AEROSOL, METERED RESPIRATORY (INHALATION) ONCE AS NEEDED
Status: CANCELLED | OUTPATIENT
Start: 2020-12-30

## 2020-12-30 RX ORDER — SODIUM CHLORIDE 9 MG/ML
20 INJECTION, SOLUTION INTRAVENOUS ONCE
Status: COMPLETED | OUTPATIENT
Start: 2020-12-30 | End: 2020-12-30

## 2020-12-30 RX ORDER — ALBUTEROL SULFATE 90 UG/1
3 AEROSOL, METERED RESPIRATORY (INHALATION) ONCE AS NEEDED
Status: DISCONTINUED | OUTPATIENT
Start: 2020-12-30 | End: 2021-01-02 | Stop reason: HOSPADM

## 2020-12-30 RX ORDER — SODIUM CHLORIDE 9 MG/ML
20 INJECTION, SOLUTION INTRAVENOUS ONCE
Status: CANCELLED | OUTPATIENT
Start: 2020-12-30

## 2020-12-30 RX ORDER — ACETAMINOPHEN 325 MG/1
650 TABLET ORAL ONCE AS NEEDED
Status: DISCONTINUED | OUTPATIENT
Start: 2020-12-30 | End: 2021-01-02 | Stop reason: HOSPADM

## 2020-12-30 RX ORDER — ACETAMINOPHEN 325 MG/1
650 TABLET ORAL ONCE AS NEEDED
Status: CANCELLED | OUTPATIENT
Start: 2020-12-30

## 2020-12-30 RX ADMIN — SODIUM CHLORIDE 700 MG: 9 INJECTION, SOLUTION INTRAVENOUS at 09:23

## 2020-12-30 RX ADMIN — SODIUM CHLORIDE 20 ML/HR: 0.9 INJECTION, SOLUTION INTRAVENOUS at 09:24

## 2020-12-30 NOTE — PROGRESS NOTES
Pt presents today for bamlanivimab infusion  Verified EUA of medication with pt, provided with educational handout, pt agrreeable to infusion  Received infusion without incident  Tolerated one hour observation

## 2020-12-30 NOTE — PLAN OF CARE
Problem: Potential for Falls  Goal: Patient will remain free of falls  Description: INTERVENTIONS:  - Assess patient frequently for physical needs  -  Identify cognitive and physical deficits and behaviors that affect risk of falls    -  Leo fall precautions as indicated by assessment   - Educate patient/family on patient safety including physical limitations  - Instruct patient to call for assistance with activity based on assessment  - Modify environment to reduce risk of injury  - Consider OT/PT consult to assist with strengthening/mobility  Outcome: Progressing

## 2020-12-31 ENCOUNTER — TELEMEDICINE (OUTPATIENT)
Dept: FAMILY MEDICINE CLINIC | Facility: HOSPITAL | Age: 65
End: 2020-12-31
Payer: COMMERCIAL

## 2020-12-31 VITALS — BODY MASS INDEX: 36.37 KG/M2 | HEIGHT: 68 IN | WEIGHT: 240 LBS

## 2020-12-31 DIAGNOSIS — U07.1 COVID-19: Primary | ICD-10-CM

## 2020-12-31 PROCEDURE — 99213 OFFICE O/P EST LOW 20 MIN: CPT | Performed by: INTERNAL MEDICINE

## 2020-12-31 PROCEDURE — 3008F BODY MASS INDEX DOCD: CPT | Performed by: FAMILY MEDICINE

## 2021-01-04 ENCOUNTER — TELEMEDICINE (OUTPATIENT)
Dept: FAMILY MEDICINE CLINIC | Facility: HOSPITAL | Age: 66
End: 2021-01-04
Payer: COMMERCIAL

## 2021-01-04 VITALS — WEIGHT: 240 LBS | BODY MASS INDEX: 36.37 KG/M2 | TEMPERATURE: 98 F | HEIGHT: 68 IN

## 2021-01-04 DIAGNOSIS — U07.1 COVID-19: Primary | ICD-10-CM

## 2021-01-04 PROCEDURE — 99213 OFFICE O/P EST LOW 20 MIN: CPT | Performed by: INTERNAL MEDICINE

## 2021-01-04 NOTE — ASSESSMENT & PLAN NOTE
Symptoms noted to be extensive on Xmas deidre  Was tested 12/24/20 and was negative  Symptoms persisted and wife came back + 12/24/20  He was retested 12/28/20 and was then +  S/p Bam infusion 12/30/20  Doing great and only with mild resolving fatigue  Has met CDC criteria for D/C of isolation  Recommended con't social distancing recommendations   Call with relapse in symptoms

## 2021-01-04 NOTE — PROGRESS NOTES
COVID-19 Virtual Visit     Assessment/Plan:    Problem List Items Addressed This Visit        Other    COVID-19 - Primary     Symptoms noted to be extensive on Xmas deidre  Was tested 12/24/20 and was negative  Symptoms persisted and wife came back + 12/24/20  He was retested 12/28/20 and was then +  S/p Bam infusion 12/30/20  Doing great and only with mild resolving fatigue  Has met CDC criteria for D/C of isolation  Recommended con't social distancing recommendations  Call with relapse in symptoms              I have spent 10 minutes with Patient  today in which greater than 50% of this time was spent in counseling/coordination of care regarding Intructions for management, Patient and family education, Importance of tx compliance and Impressions  Encounter provider Bandar Alves DO    Provider located at [address] [place] [address] MD [address]  [facility] [id number]    Recent Visits  Date Type Provider Dept   12/31/20 Telemedicine DO Jona Cross Md   12/28/20 Telemedicine MD Jona Barroso Md   Showing recent visits within past 7 days and meeting all other requirements     Today's Visits  Date Type Provider Dept   01/04/21 101 Lois Michael, DO Jona Baptiste Md   Showing today's visits and meeting all other requirements     Future Appointments  No visits were found meeting these conditions  Showing future appointments within next 150 days and meeting all other requirements      This virtual check-in was done via [employer] and patient was informed that this is not a secure, HIPAA-compliant platform  He agrees to proceed  Patient agrees to participate in a virtual check in via telephone or video visit instead of presenting to the office to address urgent/immediate medical needs  Patient is aware this is a billable service      After connecting through [facility], the patient was identified by name and date of birth  Tamela Cloud was informed that this was a telemedicine visit and that the exam was being conducted confidentially over secure lines  My office door was closed  No one else was in the room  Tamela Cloud acknowledged consent and understanding of privacy and security of the telemedicine visit  I informed the patient that I have reviewed his record in Epic and presented the opportunity for him to ask any questions regarding the visit today  The patient agreed to participate  Subjective:   Tamela Cloud is a 77 y o  male who has been screened for COVID-19  Symptom change since last report: improving  Date of symptom onset: 12/24/2020    Patient's symptoms include fatigue, nasal congestion and rhinorrhea  Patient denies fever, chills, sore throat, anosmia, loss of taste, cough, shortness of breath, chest tightness, abdominal pain, nausea, vomiting, diarrhea, myalgias and headaches  Leno Tanner has been staying home and has isolated themselves in his home  He is taking care to not share personal items and is cleaning all surfaces that are touched often, like counters, tabletops, and doorknobs using household cleaning sprays or wipes  He is wearing a mask when he leaves his room  Monoclonal Antibody Follow-up Symptom Questionnaire  I feel overall: much better  My breathing is: much better  My fever is: better  My fatigue is: much better    Symptoms noted to be extensive on Xmas deidre  Was tested 12/24/20 and was negative  Symptoms persisted and wife came back + 12/24/20  He was retested 12/28/20 and was then +  S/p Bam infusion 12/30/20      Lab Results   Component Value Date    SARSCOV2 Detected (A) 12/28/2020     Past Medical History:   Diagnosis Date    Cancer [address] [address] )     Hypertension 2016     Past Surgical History:   Procedure Laterality Date    COLONOSCOPY  01/18/2016    complete - serrted adenoma    CYST REMOVAL      DENTAL SURGERY  2017    Root canals due to cancer  INCISION AND DRAINAGE ABSCESS / HEMATOMA OF BURSA / KNEE / THIGH  02/09/2015    rigjt upper, inner arm abscess    PORTACATH PLACEMENT  2011    And removed      MD KNEE SCOPE,MED/LAT MENISECTOMY Left 11/19/2018    Procedure: ARTHROSCOPY KNEE PARTIAL  LATERAL MENISECTOMY;  Surgeon: Fitz Hernandez MD;  Location: [facility];  Service: Orthopedics    TONSILLECTOMY      As kid    VASECTOMY  1978     Current Outpatient Medications   Medication Sig Dispense Refill    amLODIPine (NORVASC) 5 mg tablet Take 1 tablet (5 mg total) by mouth daily 90 tablet 1    atorvastatin (LIPITOR) 20 mg tablet TAKE 1 TABLET BY MOUTH EVERY DAY 90 tablet 1    hydrochlorothiazide (HYDRODIURIL) 25 mg tablet TAKE 1 TABLET BY MOUTH EVERY DAY 90 tablet 1    losartan (COZAAR) 25 mg tablet Take 1 tablet (25 mg total) by mouth daily 90 tablet 1     No current facility-administered medications for this visit  No Known Allergies    Review of Systems   Constitutional: Positive for fatigue  Negative for appetite change, chills and fever  HENT: Positive for congestion and rhinorrhea  Negative for sore throat  Eyes: Negative for redness  Respiratory: Negative for cough, chest tightness and shortness of breath  Gastrointestinal: Negative for abdominal pain, diarrhea, nausea and vomiting  Musculoskeletal: Negative for myalgias  Skin: Negative for rash  Neurological: Negative for headaches  Objective:    Vitals:    01/04/21 0844   Temp: 98 °F (36 7 °C)   Weight: 109 kg (240 lb)   Height: 5' 8" (1 727 m)       Physical Exam  Vitals signs and nursing note reviewed  Constitutional:       General: He is not in acute distress  Appearance: He is not ill-appearing  HENT:      Head: Normocephalic and atraumatic  Eyes:      General:         Right eye: No discharge  Left eye: No discharge  Conjunctiva/sclera: Conjunctivae normal    Pulmonary:      Effort: Pulmonary effort is normal  No respiratory distress  Skin:     Coloration: Skin is not pale  Findings: No rash  Psychiatric:         Mood and Affect: Mood normal          Behavior: Behavior normal          Thought Content: Thought content normal          Judgment: Judgment normal        VIRTUAL VISIT DISCLAIMER    Eduard Ferguson acknowledges that he has consented to an online visit or consultation  He understands that the online visit is based solely on information provided by him, and that, in the absence of a face-to-face physical evaluation by the physician, the diagnosis he receives is both limited and provisional in terms of accuracy and completeness  This is not intended to replace a full medical face-to-face evaluation by the physician  Pati Alves understands and accepts these terms

## 2021-01-07 LAB
ALBUMIN SERPL-MCNC: 4.7 G/DL (ref 3.8–4.8)
ALBUMIN/GLOB SERPL: 1.9 {RATIO} (ref 1.2–2.2)
ALP SERPL-CCNC: 58 IU/L (ref 39–117)
ALT SERPL-CCNC: 25 IU/L (ref 0–44)
AST SERPL-CCNC: 24 IU/L (ref 0–40)
BILIRUB SERPL-MCNC: 1 MG/DL (ref 0–1.2)
BUN SERPL-MCNC: 17 MG/DL (ref 8–27)
BUN/CREAT SERPL: 16 (ref 10–24)
CALCIUM SERPL-MCNC: 9.4 MG/DL (ref 8.6–10.2)
CHLORIDE SERPL-SCNC: 101 MMOL/L (ref 96–106)
CHOLEST SERPL-MCNC: 150 MG/DL (ref 100–199)
CO2 SERPL-SCNC: 26 MMOL/L (ref 20–29)
CREAT SERPL-MCNC: 1.05 MG/DL (ref 0.76–1.27)
GLOBULIN SER-MCNC: 2.5 G/DL (ref 1.5–4.5)
GLUCOSE SERPL-MCNC: 93 MG/DL (ref 65–99)
HBA1C MFR BLD: 5.5 % (ref 4.8–5.6)
HDLC SERPL-MCNC: 41 MG/DL
LDLC SERPL CALC-MCNC: 91 MG/DL (ref 0–99)
POTASSIUM SERPL-SCNC: 4 MMOL/L (ref 3.5–5.2)
PROT SERPL-MCNC: 7.2 G/DL (ref 6–8.5)
SL AMB EGFR AFRICAN AMERICAN: 84 ML/MIN/1.73
SL AMB EGFR NON AFRICAN AMERICAN: 73 ML/MIN/1.73
SL AMB VLDL CHOLESTEROL CALC: 18 MG/DL (ref 5–40)
SODIUM SERPL-SCNC: 141 MMOL/L (ref 134–144)
TRIGL SERPL-MCNC: 96 MG/DL (ref 0–149)

## 2021-01-12 ENCOUNTER — OFFICE VISIT (OUTPATIENT)
Dept: FAMILY MEDICINE CLINIC | Facility: HOSPITAL | Age: 66
End: 2021-01-12
Payer: COMMERCIAL

## 2021-01-12 VITALS
WEIGHT: 239.8 LBS | TEMPERATURE: 97.8 F | BODY MASS INDEX: 36.34 KG/M2 | DIASTOLIC BLOOD PRESSURE: 92 MMHG | HEART RATE: 98 BPM | SYSTOLIC BLOOD PRESSURE: 118 MMHG | HEIGHT: 68 IN

## 2021-01-12 DIAGNOSIS — E78.5 DYSLIPIDEMIA: ICD-10-CM

## 2021-01-12 DIAGNOSIS — I10 ESSENTIAL HYPERTENSION: ICD-10-CM

## 2021-01-12 DIAGNOSIS — Z23 ENCOUNTER FOR IMMUNIZATION: ICD-10-CM

## 2021-01-12 DIAGNOSIS — R73.9 HYPERGLYCEMIA: Primary | ICD-10-CM

## 2021-01-12 DIAGNOSIS — E66.01 SEVERE OBESITY (BMI 35.0-39.9) WITH COMORBIDITY (HCC): ICD-10-CM

## 2021-01-12 DIAGNOSIS — Z12.5 SCREENING FOR PROSTATE CANCER: ICD-10-CM

## 2021-01-12 DIAGNOSIS — Z00.00 MEDICARE ANNUAL WELLNESS VISIT, SUBSEQUENT: ICD-10-CM

## 2021-01-12 DIAGNOSIS — U07.1 COVID-19: ICD-10-CM

## 2021-01-12 PROCEDURE — 3725F SCREEN DEPRESSION PERFORMED: CPT | Performed by: INTERNAL MEDICINE

## 2021-01-12 PROCEDURE — 3008F BODY MASS INDEX DOCD: CPT | Performed by: INTERNAL MEDICINE

## 2021-01-12 PROCEDURE — 1036F TOBACCO NON-USER: CPT | Performed by: INTERNAL MEDICINE

## 2021-01-12 PROCEDURE — 1160F RVW MEDS BY RX/DR IN RCRD: CPT | Performed by: INTERNAL MEDICINE

## 2021-01-12 PROCEDURE — 1101F PT FALLS ASSESS-DOCD LE1/YR: CPT | Performed by: INTERNAL MEDICINE

## 2021-01-12 PROCEDURE — 3288F FALL RISK ASSESSMENT DOCD: CPT | Performed by: INTERNAL MEDICINE

## 2021-01-12 PROCEDURE — G0439 PPPS, SUBSEQ VISIT: HCPCS | Performed by: INTERNAL MEDICINE

## 2021-01-12 PROCEDURE — 3080F DIAST BP >= 90 MM HG: CPT | Performed by: INTERNAL MEDICINE

## 2021-01-12 PROCEDURE — 90732 PPSV23 VACC 2 YRS+ SUBQ/IM: CPT | Performed by: INTERNAL MEDICINE

## 2021-01-12 PROCEDURE — 99214 OFFICE O/P EST MOD 30 MIN: CPT | Performed by: INTERNAL MEDICINE

## 2021-01-12 PROCEDURE — 1125F AMNT PAIN NOTED PAIN PRSNT: CPT | Performed by: INTERNAL MEDICINE

## 2021-01-12 PROCEDURE — 4040F PNEUMOC VAC/ADMIN/RCVD: CPT | Performed by: INTERNAL MEDICINE

## 2021-01-12 PROCEDURE — 3074F SYST BP LT 130 MM HG: CPT | Performed by: INTERNAL MEDICINE

## 2021-01-12 PROCEDURE — 1170F FXNL STATUS ASSESSED: CPT | Performed by: INTERNAL MEDICINE

## 2021-01-12 PROCEDURE — G0009 ADMIN PNEUMOCOCCAL VACCINE: HCPCS | Performed by: INTERNAL MEDICINE

## 2021-01-12 NOTE — ASSESSMENT & PLAN NOTE
TC and LDL now at goal with statin, con't current dose, diet/exercise/wgt loss encouraged, recheck FLP annually

## 2021-01-12 NOTE — PROGRESS NOTES
Assessment/Plan:    Hyperglycemia  Both FBS/A1C wnl, urged healthy diet and keep active and get wgt down - recheck BW in 1 yr    Dyslipidemia  TC and LDL now at goal with statin, con't current dose, diet/exercise/wgt loss encouraged, recheck FLP annually    Hypertension  DBP up a bit today - pt anxious about Derm appt tomorrow and wgt up a bit - urged low sodium diet/exercise/wgt loss, con't current BP meds, check BP at home and call if consistently > 140/90, re-eval in 6 mos    COVID-19  S/p Bam infusion and quarantine over, encouraged to con't social distancing practices, COVID vaccine reviewed       Diagnoses and all orders for this visit:    Hyperglycemia    Dyslipidemia    Essential hypertension    COVID-19    Medicare annual wellness visit, subsequent    Severe obesity (BMI 35 0-39  9) with comorbidity ([facility] [address] )    BMI 36 0-36 9,adult    Screening for prostate cancer  -     PSA, Total Screen; Future      Colonoscopy 1/16 - 10 yrs    Pneumovax given today        Subjective:      Patient ID: Ascencion Velez is a 77 y o  male  HPI Pt here for follow up appt/BW results and  AWV    BW results were d/w pt in detail: FBS/A1c 93/5 5, rest of CMP and FLP was wnl  Def of nml vs IFG vs DM was d/w pt in detail  Diet/exercise was reviewed - wgt up 6 lbs from June 2020  BMI reviewed  He was eating more baked goods with the holidays  He is not as active with recent COVID infection  Goal FLP was d/w pt in detail  Diet/exercise reviewed as noted above  He is taking his Atorvastatin daily as directed w/o Se  He notes no stroke/TIA symptoms/CP  BP at goal today and meds were reviewed and no changes have occurred  He denies missing doses of meds or SE with the meds  He does not check his BP outside the office  He notes no frequent HA's/dizziness/double vision/CP  Had tested + for COVID 12/28/20  Had Bam infusion 12/30/20 and did very well    Has finished quarantine and has been back to normal activities since 1/4/21       Colonoscopy 1/16 - 10 yrs    Review of Systems      Objective:    /92   Pulse 98   Temp 97 8 °F (36 6 °C) (Temporal)   Ht 5' 8" (1 727 m)   Wt 109 kg (239 lb 12 8 oz)   BMI 36 46 kg/m²      Physical Exam

## 2021-01-12 NOTE — ASSESSMENT & PLAN NOTE
DBP up a bit today - pt anxious about Derm appt tomorrow and wgt up a bit - urged low sodium diet/exercise/wgt loss, con't current BP meds, check BP at home and call if consistently > 140/90, re-eval in 6 mos

## 2021-01-12 NOTE — ASSESSMENT & PLAN NOTE
S/p Bam infusion and quarantine over, encouraged to con't social distancing practices, COVID vaccine reviewed

## 2021-01-12 NOTE — PROGRESS NOTES
Assessment and Plan:     Problem List Items Addressed This Visit        Cardiovascular and Mediastinum    Hypertension       Other    Dyslipidemia    Hyperglycemia - Primary    COVID-19        BMI Counseling: Body mass index is 36 46 kg/m²  The BMI is above normal  Nutrition recommendations include decreasing portion sizes, encouraging healthy choices of fruits and vegetables, consuming healthier snacks, limiting drinks that contain sugar, moderation in carbohydrate intake, increasing intake of lean protein and reducing intake of saturated and trans fat  Exercise recommendations include exercising 3-5 times per week  No pharmacotherapy was ordered  Preventive health issues were discussed with patient, and age appropriate screening tests were ordered as noted in patient's After Visit Summary  Personalized health advice and appropriate referrals for health education or preventive services given if needed, as noted in patient's After Visit Summary       History of Present Illness:     Patient presents for Medicare Annual Wellness visit    Patient Care Team:  Rossi Shah DO as PCP - General     Problem List:     Patient Active Problem List   Diagnosis    Apnea    Dyslipidemia    Hypertension    Lymphoma, head, face, or neck    Macular degeneration    Obesity    Serrated adenoma of colon    Umbilical hernia    Primary osteoarthritis of both knees    Hyperglycemia    COVID-19      Past Medical and Surgical History:     Past Medical History:   Diagnosis Date    Cancer ([address] )     Hypertension 2016     Past Surgical History:   Procedure Laterality Date    COLONOSCOPY  01/18/2016    complete - serrted adenoma    CYST REMOVAL      DENTAL SURGERY  2017    Root canals due to cancer    INCISION AND DRAINAGE ABSCESS / HEMATOMA OF BURSA / KNEE / THIGH  02/09/2015    rigjt upper, inner arm abscess    PORTACATH PLACEMENT  2011    And removed      WI KNEE SCOPE,MED/LAT MENISECTOMY Left 11/19/2018 Procedure: ARTHROSCOPY KNEE PARTIAL  LATERAL MENISECTOMY;  Surgeon: Nicole Costello MD;  Location: [facility] OR;  Service: Orthopedics    TONSILLECTOMY      As kid   [facility] VASECTOMY  1978      Family History:     Family History   Problem Relation Age of Onset    Atrial fibrillation Mother     COPD Father     Rheumatic fever Father     Glaucoma Father     Thyroid disease Sister     Cancer Brother     Hypertension Son     Cancer Brother     Diverticulitis Son       Social History:        Social History     Socioeconomic History    Marital status: /Civil Union     Spouse name: None    Number of children: None    Years of education: None    Highest education level: None   Occupational History     Comment: full time employment    Social Needs    Financial resource strain: None    Food insecurity     Worry: None     Inability: None    Transportation needs     Medical: None     Non-medical: None   Tobacco Use    Smoking status: Never Smoker    Smokeless tobacco: Never Used   Substance and Sexual Activity    Alcohol use: Yes     Frequency: Monthly or less     Comment: 1 x week for 9 months/ year    Drug use: No    Sexual activity: Never     Comment: Resides with Adrienne Aase (Wife)   Lifestyle    Physical activity     Days per week: None     Minutes per session: None    Stress: None   Relationships    Social connections     Talks on phone: None     Gets together: None     Attends [employer] service: None     Active member of club or organization: None     Attends meetings of clubs or organizations: None     Relationship status: None    Intimate partner violence     Fear of current or ex partner: None     Emotionally abused: None     Physically abused: None     Forced sexual activity: None   Other Topics Concern    None   Social History Narrative          Medications and Allergies:     Current Outpatient Medications   Medication Sig Dispense Refill    amLODIPine (NORVASC) 5 mg tablet Take 1 tablet (5 mg total) by mouth daily 90 tablet 1    atorvastatin (LIPITOR) 20 mg tablet TAKE 1 TABLET BY MOUTH EVERY DAY 90 tablet 1    hydrochlorothiazide (HYDRODIURIL) 25 mg tablet TAKE 1 TABLET BY MOUTH EVERY DAY 90 tablet 1    losartan (COZAAR) 25 mg tablet Take 1 tablet (25 mg total) by mouth daily 90 tablet 1     No current facility-administered medications for this visit  No Known Allergies   Immunizations:     Immunization History   Administered Date(s) Administered    Influenza, high dose seasonal 0 7 mL 12/16/2019, 10/09/2020    Influenza, injectable, quadrivalent, preservative free 0 5 mL 10/03/2018    Pneumococcal Conjugate 13-Valent 12/16/2019    Tdap 01/22/2016, 05/07/2016      Health Maintenance:         Topic Date Due    Hepatitis C Screening  1955    Colorectal Cancer Screening  01/18/2026         Topic Date Due    Pneumococcal Vaccine: 65+ Years (2 of 2 - PPSV23) 12/16/2020      Medicare Health Risk Assessment:     There were no vitals taken for this visit  Clarisa Buck is here for his Subsequent Wellness visit  Last Medicare Wellness visit information reviewed, patient interviewed and updates made to the record today  Health Risk Assessment:   Patient rates overall health as good  Patient feels that their physical health rating is same  Eyesight was rated as slightly worse  Hearing was rated as slightly worse  Patient feels that their emotional and mental health rating is same  Pain experienced in the last 7 days has been none  Patient states that he has experienced no weight loss or gain in last 6 months  Eyesight and hearing slightly worse - needs new glasses   Is looking into hearing aides    Depression Screening:   PHQ-2 Score: 0      Fall Risk Screening: In the past year, patient has experienced: no history of falling in past year      Home Safety:  Patient does not have trouble with stairs inside or outside of their home   Patient has working smoke alarms and has working carbon monoxide detector  Home safety hazards include: none  Nutrition:   Current diet is Regular and Limited junk food  Medications:   Patient is not currently taking any over-the-counter supplements  Patient is able to manage medications  Activities of Daily Living (ADLs)/Instrumental Activities of Daily Living (IADLs):   Walk and transfer into and out of bed and chair?: Yes  Dress and groom yourself?: Yes    Bathe or shower yourself?: Yes    Feed yourself? Yes  Do your laundry/housekeeping?: Yes  Manage your money, pay your bills and track your expenses?: Yes  Make your own meals?: Yes    Do your own shopping?: Yes    Previous Hospitalizations:   Any hospitalizations or ED visits within the last 12 months?: No      Advance Care Planning:   Living will: Yes    Durable POA for healthcare: Yes    Advanced directive: Yes      Cognitive Screening:   Provider or family/friend/caregiver concerned regarding cognition?: No    PREVENTIVE SCREENINGS      Cardiovascular Screening:    General: Screening Current and Risks and Benefits Discussed      Diabetes Screening:     General: Screening Current and Risks and Benefits Discussed      Colorectal Cancer Screening:     General: Screening Current      Prostate Cancer Screening:    General: Screening Current and Risks and Benefits Discussed      Osteoporosis Screening:    General: Risks and Benefits Discussed and Screening Not Indicated      Abdominal Aortic Aneurysm (AAA) Screening:    Risk factors include: age between 73-67 yo        General: Risks and Benefits Discussed and Screening Not Indicated      Lung Cancer Screening:     General: Screening Not Indicated and Risks and Benefits Discussed      Hepatitis C Screening:    General: Risks and Benefits Discussed and Patient Declines    Hep C Screening Accepted: No     Other Counseling Topics:   Car/seat belt/driving safety, sunscreen and regular weightbearing exercise         Peggy Leigh, DO

## 2021-01-12 NOTE — PATIENT INSTRUCTIONS
Medicare Preventive Visit Patient Instructions  Thank you for completing your Welcome to Medicare Visit or Medicare Annual Wellness Visit today  Your next wellness visit will be due in one year (1/12/2022)  The screening/preventive services that you may require over the next 5-10 years are detailed below  Some tests may not apply to you based off risk factors and/or age  Screening tests ordered at today's visit but not completed yet may show as past due  Also, please note that scanned in results may not display below  Preventive Screenings:  Service Recommendations Previous Testing/Comments   Colorectal Cancer Screening  · Colonoscopy    · Fecal Occult Blood Test (FOBT)/Fecal Immunochemical Test (FIT)  · Fecal DNA/Cologuard Test  · Flexible Sigmoidoscopy Age: 54-65 years old   Colonoscopy: every 10 years (May be performed more frequently if at higher risk)  OR  FOBT/FIT: every 1 year  OR  Cologuard: every 3 years  OR  Sigmoidoscopy: every 5 years  Screening may be recommended earlier than age 48 if at higher risk for colorectal cancer  Also, an individualized decision between you and your healthcare provider will decide whether screening between the ages of 74-80 would be appropriate   Colonoscopy: 01/18/2016  FOBT/FIT: Not on file  Cologuard: Not on file  Sigmoidoscopy: Not on file    Screening Current     Prostate Cancer Screening Individualized decision between patient and health care provider in men between ages of 53-78   Medicare will cover every 12 months beginning on the day after your 50th birthday PSA: 0 5 ng/mL     Screening Current     Hepatitis C Screening Once for adults born between 1945 and 1965  More frequently in patients at high risk for Hepatitis C Hep C Antibody: Not on file       Diabetes Screening 1-2 times per year if you're at risk for diabetes or have pre-diabetes Fasting glucose: 105 mg/dL   A1C: 5 5 %    Screening Current   Cholesterol Screening Once every 5 years if you don't have a lipid disorder  May order more often based on risk factors  Lipid panel: 01/06/2021    Screening Current      Other Preventive Screenings Covered by Medicare:  1  Abdominal Aortic Aneurysm (AAA) Screening: covered once if your at risk  You're considered to be at risk if you have a family history of AAA or a male between the age of 73-68 who smoking at least 100 cigarettes in your lifetime  2  Lung Cancer Screening: covers low dose CT scan once per year if you meet all of the following conditions: (1) Age 50-69; (2) No signs or symptoms of lung cancer; (3) Current smoker or have quit smoking within the last 15 years; (4) You have a tobacco smoking history of at least 30 pack years (packs per day x number of years you smoked); (5) You get a written order from a healthcare provider  3  Glaucoma Screening: covered annually if you're considered high risk: (1) You have diabetes OR (2) Family history of glaucoma OR (3)  aged 48 and older OR (3)  American aged 72 and older  3  Osteoporosis Screening: covered every 2 years if you meet one of the following conditions: (1) Have a vertebral abnormality; (2) On glucocorticoid therapy for more than 3 months; (3) Have primary hyperparathyroidism; (4) On osteoporosis medications and need to assess response to drug therapy  5  HIV Screening: covered annually if you're between the age of 12-76  Also covered annually if you are younger than 13 and older than 72 with risk factors for HIV infection  For pregnant patients, it is covered up to 3 times per pregnancy      Immunizations:  Immunization Recommendations   Influenza Vaccine Annual influenza vaccination during flu season is recommended for all persons aged >= 6 months who do not have contraindications   Pneumococcal Vaccine (Prevnar and Pneumovax)  * Prevnar = PCV13  * Pneumovax = PPSV23 Adults 25-60 years old: 1-3 doses may be recommended based on certain risk factors  Adults 72 years old: Prevnar (PCV13) vaccine recommended followed by Pneumovax (PPSV23) vaccine  If already received PPSV23 since turning 65, then PCV13 recommended at least one year after PPSV23 dose  Hepatitis B Vaccine 3 dose series if at intermediate or high risk (ex: diabetes, end stage renal disease, liver disease)   Tetanus (Td) Vaccine - COST NOT COVERED BY MEDICARE PART B Following completion of primary series, a booster dose should be given every 10 years to maintain immunity against tetanus  Td may also be given as tetanus wound prophylaxis  Tdap Vaccine - COST NOT COVERED BY MEDICARE PART B Recommended at least once for all adults  For pregnant patients, recommended with each pregnancy  Shingles Vaccine (Shingrix) - COST NOT COVERED BY MEDICARE PART B  2 shot series recommended in those aged 48 and above     Health Maintenance Due:      Topic Date Due    Hepatitis C Screening  1955    Colorectal Cancer Screening  01/18/2026     Immunizations Due:      Topic Date Due    Pneumococcal Vaccine: 65+ Years (2 of 2 - PPSV23) 12/16/2020     Advance Directives   What are advance directives? Advance directives are legal documents that state your wishes and plans for medical care  These plans are made ahead of time in case you lose your ability to make decisions for yourself  Advance directives can apply to any medical decision, such as the treatments you want, and if you want to donate organs  What are the types of advance directives? There are many types of advance directives, and each state has rules about how to use them  You may choose a combination of any of the following:  · Living will: This is a written record of the treatment you want  You can also choose which treatments you do not want, which to limit, and which to stop at a certain time  This includes surgery, medicine, IV fluid, and tube feedings  · Durable power of  for healthcare [place] [facility]):   This is a written record that states who you want to make healthcare choices for you when you are unable to make them for yourself  This person, called a proxy, is usually a family member or a friend  You may choose more than 1 proxy  · Do not resuscitate (DNR) order:  A DNR order is used in case your heart stops beating or you stop breathing  It is a request not to have certain forms of treatment, such as CPR  A DNR order may be included in other types of advance directives  · Medical directive: This covers the care that you want if you are in a coma, near death, or unable to make decisions for yourself  You can list the treatments you want for each condition  Treatment may include pain medicine, surgery, blood transfusions, dialysis, IV or tube feedings, and a ventilator (breathing machine)  · Values history: This document has questions about your views, beliefs, and how you feel and think about life  This information can help others choose the care that you would choose  Why are advance directives important? An advance directive helps you control your care  Although spoken wishes may be used, it is better to have your wishes written down  Spoken wishes can be misunderstood, or not followed  Treatments may be given even if you do not want them  An advance directive may make it easier for your family to make difficult choices about your care  Weight Management   Why it is important to manage your weight:  Being overweight increases your risk of health conditions such as heart disease, high blood pressure, type 2 diabetes, and certain types of cancer  It can also increase your risk for osteoarthritis, sleep apnea, and other respiratory problems  Aim for a slow, steady weight loss  Even a small amount of weight loss can lower your risk of health problems  How to lose weight safely:  A safe and healthy way to lose weight is to eat fewer calories and get regular exercise   You can lose up about 1 pound a week by decreasing the number of calories you eat by 500 calories each day  Healthy meal plan for weight management:  A healthy meal plan includes a variety of foods, contains fewer calories, and helps you stay healthy  A healthy meal plan includes the following:  · Eat whole-grain foods more often  A healthy meal plan should contain fiber  Fiber is the part of grains, fruits, and vegetables that is not broken down by your body  Whole-grain foods are healthy and provide extra fiber in your diet  Some examples of whole-grain foods are whole-wheat breads and pastas, oatmeal, brown rice, and bulgur  · Eat a variety of vegetables every day  Include dark, leafy greens such as spinach, kale, justina greens, and mustard greens  Eat yellow and orange vegetables such as carrots, sweet potatoes, and winter squash  · Eat a variety of fruits every day  Choose fresh or canned fruit (canned in its own juice or light syrup) instead of juice  Fruit juice has very little or no fiber  · Eat low-fat dairy foods  Drink fat-free (skim) milk or 1% milk  Eat fat-free yogurt and low-fat cottage cheese  Try low-fat cheeses such as mozzarella and other reduced-fat cheeses  · Choose meat and other protein foods that are low in fat  Choose beans or other legumes such as split peas or lentils  Choose fish, skinless poultry (chicken or turkey), or lean cuts of red meat (beef or pork)  Before you cook meat or poultry, cut off any visible fat  · Use less fat and oil  Try baking foods instead of frying them  Add less fat, such as margarine, sour cream, regular salad dressing and mayonnaise to foods  Eat fewer high-fat foods  Some examples of high-fat foods include french fries, doughnuts, ice cream, and cakes  · Eat fewer sweets  Limit foods and drinks that are high in sugar  This includes candy, cookies, regular soda, and sweetened drinks  Exercise:  Exercise at least 30 minutes per day on most days of the week  Some examples of exercise include walking, biking, dancing, and swimming  You can also fit in more physical activity by taking the stairs instead of the elevator or parking farther away from stores  Ask your healthcare provider about the best exercise plan for you  © Copyright [employer] 2018 Information is for End User's use only and may not be sold, redistributed or otherwise used for commercial purposes  All illustrations and images included in CareNotes® are the copyrighted property of [employer]  or [facility] Preventive Visit Patient Instructions  Thank you for completing your Welcome to Medicare Visit or Medicare Annual Wellness Visit today  Your next wellness visit will be due in one year (1/12/2022)  The screening/preventive services that you may require over the next 5-10 years are detailed below  Some tests may not apply to you based off risk factors and/or age  Screening tests ordered at today's visit but not completed yet may show as past due  Also, please note that scanned in results may not display below  Preventive Screenings:  Service Recommendations Previous Testing/Comments   Colorectal Cancer Screening  · Colonoscopy    · Fecal Occult Blood Test (FOBT)/Fecal Immunochemical Test (FIT)  · Fecal DNA/Cologuard Test  · Flexible Sigmoidoscopy Age: 54-65 years old   Colonoscopy: every 10 years (May be performed more frequently if at higher risk)  OR  FOBT/FIT: every 1 year  OR  Cologuard: every 3 years  OR  Sigmoidoscopy: every 5 years  Screening may be recommended earlier than age 48 if at higher risk for colorectal cancer  Also, an individualized decision between you and your healthcare provider will decide whether screening between the ages of 74-80 would be appropriate   Colonoscopy: 01/18/2016  FOBT/FIT: Not on file  Cologuard: Not on file  Sigmoidoscopy: Not on file    Screening Current     Prostate Cancer Screening Individualized decision between patient and health care provider in men between ages of 53-78   Medicare will cover every 12 months beginning on the day after your 50th birthday PSA: 0 5 ng/mL     Screening Current     Hepatitis C Screening Once for adults born between 1945 and 1965  More frequently in patients at high risk for Hepatitis C Hep C Antibody: Not on file       Diabetes Screening 1-2 times per year if you're at risk for diabetes or have pre-diabetes Fasting glucose: 105 mg/dL   A1C: 5 5 %    Screening Current   Cholesterol Screening Once every 5 years if you don't have a lipid disorder  May order more often based on risk factors  Lipid panel: 01/06/2021    Screening Current      Other Preventive Screenings Covered by Medicare:  6  Abdominal Aortic Aneurysm (AAA) Screening: covered once if your at risk  You're considered to be at risk if you have a family history of AAA or a male between the age of 73-68 who smoking at least 100 cigarettes in your lifetime  7  Lung Cancer Screening: covers low dose CT scan once per year if you meet all of the following conditions: (1) Age 50-69; (2) No signs or symptoms of lung cancer; (3) Current smoker or have quit smoking within the last 15 years; (4) You have a tobacco smoking history of at least 30 pack years (packs per day x number of years you smoked); (5) You get a written order from a healthcare provider  8  Glaucoma Screening: covered annually if you're considered high risk: (1) You have diabetes OR (2) Family history of glaucoma OR (3)  aged 48 and older OR (3)  American aged 72 and older  5  Osteoporosis Screening: covered every 2 years if you meet one of the following conditions: (1) Have a vertebral abnormality; (2) On glucocorticoid therapy for more than 3 months; (3) Have primary hyperparathyroidism; (4) On osteoporosis medications and need to assess response to drug therapy  10  HIV Screening: covered annually if you're between the age of 12-76  Also covered annually if you are younger than 13 and older than 72 with risk factors for HIV infection   For pregnant patients, it is covered up to 3 times per pregnancy  Immunizations:  Immunization Recommendations   Influenza Vaccine Annual influenza vaccination during flu season is recommended for all persons aged >= 6 months who do not have contraindications   Pneumococcal Vaccine (Prevnar and Pneumovax)  * Prevnar = PCV13  * Pneumovax = PPSV23 Adults 25-60 years old: 1-3 doses may be recommended based on certain risk factors  Adults 72 years old: Prevnar (PCV13) vaccine recommended followed by Pneumovax (PPSV23) vaccine  If already received PPSV23 since turning 65, then PCV13 recommended at least one year after PPSV23 dose  Hepatitis B Vaccine 3 dose series if at intermediate or high risk (ex: diabetes, end stage renal disease, liver disease)   Tetanus (Td) Vaccine - COST NOT COVERED BY MEDICARE PART B Following completion of primary series, a booster dose should be given every 10 years to maintain immunity against tetanus  Td may also be given as tetanus wound prophylaxis  Tdap Vaccine - COST NOT COVERED BY MEDICARE PART B Recommended at least once for all adults  For pregnant patients, recommended with each pregnancy  Shingles Vaccine (Shingrix) - COST NOT COVERED BY MEDICARE PART B  2 shot series recommended in those aged 48 and above     Health Maintenance Due:      Topic Date Due    Hepatitis C Screening  1955    Colorectal Cancer Screening  01/18/2026     Immunizations Due:      Topic Date Due    Pneumococcal Vaccine: 65+ Years (2 of 2 - PPSV23) 12/16/2020     Advance Directives   What are advance directives? Advance directives are legal documents that state your wishes and plans for medical care  These plans are made ahead of time in case you lose your ability to make decisions for yourself  Advance directives can apply to any medical decision, such as the treatments you want, and if you want to donate organs  What are the types of advance directives?   There are many types of advance directives, and each state has rules about how to use them  You may choose a combination of any of the following:  · Living will: This is a written record of the treatment you want  You can also choose which treatments you do not want, which to limit, and which to stop at a certain time  This includes surgery, medicine, IV fluid, and tube feedings  · Durable power of  for healthcare [place] [facility]): This is a written record that states who you want to make healthcare choices for you when you are unable to make them for yourself  This person, called a proxy, is usually a family member or a friend  You may choose more than 1 proxy  · Do not resuscitate (DNR) order:  A DNR order is used in case your heart stops beating or you stop breathing  It is a request not to have certain forms of treatment, such as CPR  A DNR order may be included in other types of advance directives  · Medical directive: This covers the care that you want if you are in a coma, near death, or unable to make decisions for yourself  You can list the treatments you want for each condition  Treatment may include pain medicine, surgery, blood transfusions, dialysis, IV or tube feedings, and a ventilator (breathing machine)  · Values history: This document has questions about your views, beliefs, and how you feel and think about life  This information can help others choose the care that you would choose  Why are advance directives important? An advance directive helps you control your care  Although spoken wishes may be used, it is better to have your wishes written down  Spoken wishes can be misunderstood, or not followed  Treatments may be given even if you do not want them  An advance directive may make it easier for your family to make difficult choices about your care     Weight Management   Why it is important to manage your weight:  Being overweight increases your risk of health conditions such as heart disease, high blood pressure, type 2 diabetes, and certain types of cancer  It can also increase your risk for osteoarthritis, sleep apnea, and other respiratory problems  Aim for a slow, steady weight loss  Even a small amount of weight loss can lower your risk of health problems  How to lose weight safely:  A safe and healthy way to lose weight is to eat fewer calories and get regular exercise  You can lose up about 1 pound a week by decreasing the number of calories you eat by 500 calories each day  Healthy meal plan for weight management:  A healthy meal plan includes a variety of foods, contains fewer calories, and helps you stay healthy  A healthy meal plan includes the following:  · Eat whole-grain foods more often  A healthy meal plan should contain fiber  Fiber is the part of grains, fruits, and vegetables that is not broken down by your body  Whole-grain foods are healthy and provide extra fiber in your diet  Some examples of whole-grain foods are whole-wheat breads and pastas, oatmeal, brown rice, and bulgur  · Eat a variety of vegetables every day  Include dark, leafy greens such as spinach, kale, justina greens, and mustard greens  Eat yellow and orange vegetables such as carrots, sweet potatoes, and winter squash  · Eat a variety of fruits every day  Choose fresh or canned fruit (canned in its own juice or light syrup) instead of juice  Fruit juice has very little or no fiber  · Eat low-fat dairy foods  Drink fat-free (skim) milk or 1% milk  Eat fat-free yogurt and low-fat cottage cheese  Try low-fat cheeses such as mozzarella and other reduced-fat cheeses  · Choose meat and other protein foods that are low in fat  Choose beans or other legumes such as split peas or lentils  Choose fish, skinless poultry (chicken or turkey), or lean cuts of red meat (beef or pork)  Before you cook meat or poultry, cut off any visible fat  · Use less fat and oil  Try baking foods instead of frying them   Add less fat, such as margarine, sour cream, regular salad dressing and mayonnaise to foods  Eat fewer high-fat foods  Some examples of high-fat foods include french fries, doughnuts, ice cream, and cakes  · Eat fewer sweets  Limit foods and drinks that are high in sugar  This includes candy, cookies, regular soda, and sweetened drinks  Exercise:  Exercise at least 30 minutes per day on most days of the week  Some examples of exercise include walking, biking, dancing, and swimming  You can also fit in more physical activity by taking the stairs instead of the elevator or parking farther away from stores  Ask your healthcare provider about the best exercise plan for you  © Copyright [employer] 2018 Information is for End User's use only and may not be sold, redistributed or otherwise used for commercial purposes   All illustrations and images included in CareNotes® are the copyrighted property of [employer]  or [address] [place] [address]

## 2021-01-15 DIAGNOSIS — I10 ESSENTIAL HYPERTENSION: ICD-10-CM

## 2021-01-15 RX ORDER — LOSARTAN POTASSIUM 25 MG/1
TABLET ORAL
Qty: 90 TABLET | Refills: 1 | Status: SHIPPED | OUTPATIENT
Start: 2021-01-15 | End: 2021-08-20

## 2021-01-15 NOTE — TELEPHONE ENCOUNTER
Cheryl Ferro is a 78 y.o. male patient rib pain is better    Current Facility-Administered Medications   Medication Dose Route Frequency Provider Last Rate Last Dose    albuterol (PROVENTIL) nebulizer solution 2.5 mg  2.5 mg Nebulization 4x daily Amalia Staples MD   2.5 mg at 05/19/20 0423    simvastatin (ZOCOR) tablet 20 mg  20 mg Oral Nightly Amalia Staples MD   20 mg at 05/18/20 2244    [Held by provider] lisinopril (PRINIVIL;ZESTRIL) tablet 2.5 mg  2.5 mg Oral Daily Amalia Staples MD        ranolazine [facility]) extended release tablet 1,000 mg  1,000 mg Oral BID Amalia Staples MD   1,000 mg at 05/18/20 2244    sertraline (ZOLOFT) tablet 100 mg  100 mg Oral Daily Amalia Staples MD        vitamin B-1 (THIAMINE) tablet 100 mg  100 mg Oral Daily Amalia Staples MD   100 mg at 05/18/20 2244    clopidogrel (PLAVIX) tablet 75 mg  75 mg Oral Daily Amalia Staples MD        oxyCODONE-acetaminophen (PERCOCET) 5-325 MG per tablet 1 tablet  1 tablet Oral Q4H PRN Amalia Staples MD   1 tablet at 05/18/20 2244    morphine (PF) injection 1 mg  1 mg Intravenous Q4H PRN Amalia Staples MD   1 mg at 05/19/20 0411    sodium chloride flush 0.9 % injection 10 mL  10 mL Intravenous 2 times per day Amalia Staples MD   10 mL at 05/18/20 2244    sodium chloride flush 0.9 % injection 10 mL  10 mL Intravenous PRN Amalia Staples MD        acetaminophen (TYLENOL) tablet 650 mg  650 mg Oral Q6H PRN Amalia Staples MD        Or   [place] acetaminophen (TYLENOL) suppository 650 mg  650 mg Rectal Q6H PRN Amalia Staples MD        polyethylene glycol (GLYCOLAX) packet 17 g  17 g Oral Daily PRN Amalia Staples MD        promethazine (PHENERGAN) tablet 12.5 mg  12.5 mg Oral Q6H PRN Amalia Staples MD        enoxaparin (LOVENOX) injection 30 mg  30 mg Subcutaneous Daily Amalia Staples MD         No Known Allergies  Active Problems:    Closed fracture of one rib of left side with delayed healing Requested medication(s) are due for refill today: Yes  Patient has already received a courtesy refill: No  Other reason request has been forwarded to provider: Fracture four ribs-closed, unspecified laterality, sequela  Resolved Problems:    * No resolved hospital problems. *    Blood pressure 107/68, pulse 68, temperature 98.1 °F (36.7 °C), temperature source Oral, resp. rate 12, height 5' 6\" (1.676 m), weight 189 lb 1.6 oz (85.8 kg), SpO2 96 %. Subjective:  Symptoms:  Stable. Diet:  Adequate intake. Pain:  He complains of pain that is mild. Objective:  General Appearance:  Comfortable. Vital signs: (most recent): Blood pressure (!) 92/58, pulse 79, temperature 98.4 °F (36.9 °C), temperature source Oral, resp. rate 18, height 5' 6\" (1.676 m), weight 189 lb 1.6 oz (85.8 kg), SpO2 100 %. Vital signs are normal.    HEENT: Normal HEENT exam.    Heart: Normal rate. Abdomen: Abdomen is soft. Bowel sounds are normal.     Extremities: Decreased range of motion. Neurological: Patient is alert. Pupils:  Pupils are equal, round, and reactive to light. Skin:  Warm. Assessment & Plan  Chest pain due to left 4-7 rib fx from mechanical fall  -CT surg   -covid neg 5/13  -CT with bronchiolitis  CAD  -plavix, ranexa  Hyperkalemia  -hold ACE  -low potassium diet and nephro consult  Afib  - not on AC due to fall  CHF diastolic and systolic  -unable to start on ACE with hyperkalemia  -unalbe to start BB as SBP 90s  CKD 3   -stable  Macrocytosis  -B12 folate level  DVT prophylaxis  -lovenox      Not safe for discharge with his persistent hyperkalemia      Patient was seen in hospital for chf, cad. I am prescribing oxygen because the diagnosis and testing requires the patient to have oxygen in the home. Conditions will improve or be benefited by oxygen use. The patient is able to perform good mobility and therefore requires the use of a portable oxygen system for ambulation.     Kenny Dozier MD  5/19/2020

## 2021-03-04 DIAGNOSIS — Z23 ENCOUNTER FOR IMMUNIZATION: ICD-10-CM

## 2021-03-05 ENCOUNTER — TELEPHONE (OUTPATIENT)
Dept: FAMILY MEDICINE CLINIC | Facility: HOSPITAL | Age: 66
End: 2021-03-05

## 2021-03-05 NOTE — TELEPHONE ENCOUNTER
Pt got their 3rd notice in the mail that wellness visit 1/12/21 is Not being covered  He said its not a bill, just a notice, can we help him with this?

## 2021-03-07 DIAGNOSIS — I10 ESSENTIAL HYPERTENSION: ICD-10-CM

## 2021-03-08 RX ORDER — AMLODIPINE BESYLATE 5 MG/1
5 TABLET ORAL DAILY
Qty: 90 TABLET | Refills: 1 | Status: SHIPPED | OUTPATIENT
Start: 2021-03-08 | End: 2021-09-03

## 2021-03-09 ENCOUNTER — IMMUNIZATIONS (OUTPATIENT)
Dept: FAMILY MEDICINE CLINIC | Facility: HOSPITAL | Age: 66
End: 2021-03-09

## 2021-03-09 DIAGNOSIS — Z23 ENCOUNTER FOR IMMUNIZATION: Primary | ICD-10-CM

## 2021-03-09 PROCEDURE — 91300 SARS-COV-2 / COVID-19 MRNA VACCINE (PFIZER-BIONTECH) 30 MCG: CPT | Performed by: NURSE PRACTITIONER

## 2021-03-09 PROCEDURE — 0001A SARS-COV-2 / COVID-19 MRNA VACCINE (PFIZER-BIONTECH) 30 MCG: CPT | Performed by: NURSE PRACTITIONER

## 2021-03-29 ENCOUNTER — IMMUNIZATIONS (OUTPATIENT)
Dept: FAMILY MEDICINE CLINIC | Facility: HOSPITAL | Age: 66
End: 2021-03-29

## 2021-03-29 DIAGNOSIS — Z23 ENCOUNTER FOR IMMUNIZATION: Primary | ICD-10-CM

## 2021-03-29 PROCEDURE — 91300 SARS-COV-2 / COVID-19 MRNA VACCINE (PFIZER-BIONTECH) 30 MCG: CPT

## 2021-03-29 PROCEDURE — 0002A SARS-COV-2 / COVID-19 MRNA VACCINE (PFIZER-BIONTECH) 30 MCG: CPT

## 2021-04-13 NOTE — TELEPHONE ENCOUNTER
Spoke w/ patient's wife  This was an EOB, likely stating that the measurements we are required to report are not covered - these are zero dollar items, so no bill was generated  Patient currently has no self-pay balance

## 2021-04-15 DIAGNOSIS — E78.5 DYSLIPIDEMIA: ICD-10-CM

## 2021-04-15 RX ORDER — ATORVASTATIN CALCIUM 20 MG/1
TABLET, FILM COATED ORAL
Qty: 90 TABLET | Refills: 1 | Status: SHIPPED | OUTPATIENT
Start: 2021-04-15 | End: 2021-06-09 | Stop reason: SDUPTHER

## 2021-05-24 DIAGNOSIS — E78.5 DYSLIPIDEMIA: ICD-10-CM

## 2021-05-24 RX ORDER — ATORVASTATIN CALCIUM 20 MG/1
20 TABLET, FILM COATED ORAL DAILY
Qty: 90 TABLET | Refills: 0 | OUTPATIENT
Start: 2021-05-24

## 2021-06-09 DIAGNOSIS — E78.5 DYSLIPIDEMIA: ICD-10-CM

## 2021-06-10 RX ORDER — ATORVASTATIN CALCIUM 20 MG/1
20 TABLET, FILM COATED ORAL DAILY
Qty: 90 TABLET | Refills: 1 | Status: SHIPPED | OUTPATIENT
Start: 2021-06-10 | End: 2021-09-13 | Stop reason: SDUPTHER

## 2021-06-24 LAB — PSA SERPL-MCNC: 0.4 NG/ML (ref 0–4)

## 2021-07-10 DIAGNOSIS — I10 ESSENTIAL HYPERTENSION: ICD-10-CM

## 2021-07-18 RX ORDER — HYDROCHLOROTHIAZIDE 25 MG/1
TABLET ORAL
Qty: 90 TABLET | Refills: 1 | Status: SHIPPED | OUTPATIENT
Start: 2021-07-18 | End: 2022-01-27

## 2021-08-05 ENCOUNTER — OFFICE VISIT (OUTPATIENT)
Dept: FAMILY MEDICINE CLINIC | Facility: HOSPITAL | Age: 66
End: 2021-08-05
Payer: COMMERCIAL

## 2021-08-05 VITALS
BODY MASS INDEX: 35.07 KG/M2 | HEIGHT: 68 IN | WEIGHT: 231.4 LBS | SYSTOLIC BLOOD PRESSURE: 124 MMHG | DIASTOLIC BLOOD PRESSURE: 84 MMHG | HEART RATE: 64 BPM | TEMPERATURE: 96.1 F

## 2021-08-05 DIAGNOSIS — M25.561 CHRONIC PAIN OF BOTH KNEES: ICD-10-CM

## 2021-08-05 DIAGNOSIS — C85.91 LYMPHOMA OF LYMPH NODES OF HEAD AND NECK REGION (HCC): ICD-10-CM

## 2021-08-05 DIAGNOSIS — E78.5 DYSLIPIDEMIA: ICD-10-CM

## 2021-08-05 DIAGNOSIS — I10 ESSENTIAL HYPERTENSION: Primary | ICD-10-CM

## 2021-08-05 DIAGNOSIS — R73.9 HYPERGLYCEMIA: ICD-10-CM

## 2021-08-05 DIAGNOSIS — M25.562 CHRONIC PAIN OF BOTH KNEES: ICD-10-CM

## 2021-08-05 DIAGNOSIS — G89.29 CHRONIC PAIN OF BOTH KNEES: ICD-10-CM

## 2021-08-05 PROBLEM — U07.1 COVID-19: Status: RESOLVED | Noted: 2020-12-29 | Resolved: 2021-08-05

## 2021-08-05 PROCEDURE — 3008F BODY MASS INDEX DOCD: CPT | Performed by: INTERNAL MEDICINE

## 2021-08-05 PROCEDURE — 99214 OFFICE O/P EST MOD 30 MIN: CPT | Performed by: INTERNAL MEDICINE

## 2021-08-05 PROCEDURE — 3074F SYST BP LT 130 MM HG: CPT | Performed by: INTERNAL MEDICINE

## 2021-08-05 PROCEDURE — 3079F DIAST BP 80-89 MM HG: CPT | Performed by: INTERNAL MEDICINE

## 2021-08-05 PROCEDURE — 1160F RVW MEDS BY RX/DR IN RCRD: CPT | Performed by: INTERNAL MEDICINE

## 2021-08-05 PROCEDURE — 1036F TOBACCO NON-USER: CPT | Performed by: INTERNAL MEDICINE

## 2021-08-05 NOTE — ASSESSMENT & PLAN NOTE
Exercise and wgt loss encouraged, tx option with OTC Tylenol as well as PT and/or Ortho referral reviewed - pt deferring for now, encouraged ice/OTC pain med prn, call with new/worse symptoms and would start with PT

## 2021-08-05 NOTE — ASSESSMENT & PLAN NOTE
Bp at goal by end of appt, con't current meds, healthy diet/exercise/wgt loss encouraged, recheck in 6 mos

## 2021-08-05 NOTE — PROGRESS NOTES
Assessment/Plan:    Hypertension  Bp at goal by end of appt, con't current meds, healthy diet/exercise/wgt loss encouraged, recheck in 6 mos    Chronic pain of both knees  Exercise and wgt loss encouraged, tx option with OTC Tylenol as well as PT and/or Ortho referral reviewed - pt deferring for now, encouraged ice/OTC pain med prn, call with new/worse symptoms and would start with PT    Lymphoma, head, face, or neck  Was discharged from [place], no red flag symptoms, CBC/LDH with labs annually - order given    Dyslipidemia  FLP due in [place] - order given, con't current statin for now, diet/exercise/wgt loss encouraged    Hyperglycemia  BW due in Jan - order given, diet/exercise/wgt loss encouraged       Diagnoses and all orders for this visit:    Essential hypertension  -     CBC and differential; Future  -     Comprehensive metabolic panel; Future  -     Lipid panel; Future  -     TSH, 3rd generation with Free T4 reflex; Future  -     CBC and differential  -     Comprehensive metabolic panel  -     Lipid panel  -     TSH, 3rd generation with Free T4 reflex    Chronic pain of both knees  -     CBC and differential; Future  -     Comprehensive metabolic panel; Future  -     Lipid panel; Future  -     TSH, 3rd generation with Free T4 reflex; Future  -     CBC and differential  -     Comprehensive metabolic panel  -     Lipid panel  -     TSH, 3rd generation with Free T4 reflex    Lymphoma, head, face, or neck  -     CBC and differential; Future  -     Comprehensive metabolic panel; Future  -     Lipid panel; Future  -     TSH, 3rd generation with Free T4 reflex; Future  -     CBC and differential  -     Comprehensive metabolic panel  -     Lipid panel  -     TSH, 3rd generation with Free T4 reflex  -     LD,Blood; Future  -     LD,Blood    Hyperglycemia  -     CBC and differential; Future  -     Comprehensive metabolic panel; Future  -     Lipid panel; Future  -     TSH, 3rd generation with Free T4 reflex;  Future  - CBC and differential  -     Comprehensive metabolic panel  -     Lipid panel  -     TSH, 3rd generation with Free T4 reflex    Dyslipidemia  -     CBC and differential; Future  -     Comprehensive metabolic panel; Future  -     Lipid panel; Future  -     TSH, 3rd generation with Free T4 reflex; Future  -     CBC and differential  -     Comprehensive metabolic panel  -     Lipid panel  -     TSH, 3rd generation with Free T4 reflex      Colonoscopy 1/16 - 5 yrs (sessile serrated polyp) - pt states he will schedule      BW 1/20  PSA 6/20        Subjective:      Patient ID: Lance Smith is a 77 y o  male  HPI Pt here for follow up appt    BP at upper end of goal today and meds were reviewed and no changes have occurred  He denies missing doses of meds or SE with the meds  He does not check his BP outside the office  He notes no frequent HA's/dizziness/double vision/CP  Con't to c/o intermittent B/L knee pain  Will use Tylenol as needed but not daily  Notes some popping sensation but no giving out or locking up  He no longer following with Onc for history of lymphoma  Notes no adenopathy/night sweats/unintentional wgt loss  Colonoscopy 1/16 - 5 yrs (sessile serrated polyp)    BW 1/20  PSA 6/20      Review of Systems   Constitutional: Negative for chills, fatigue, fever and unexpected weight change  HENT: Negative for congestion and sinus pain  Eyes: Negative for pain and visual disturbance  Respiratory: Negative for cough and shortness of breath  Cardiovascular: Negative for chest pain, palpitations and leg swelling  Gastrointestinal: Negative for abdominal pain, blood in stool, constipation, diarrhea, nausea and vomiting  Endocrine: Negative for polydipsia and polyuria  Genitourinary: Negative for difficulty urinating and dysuria  Musculoskeletal: Positive for arthralgias  Negative for joint swelling and myalgias  Skin: Negative for rash and wound     Neurological: Negative for dizziness and headaches  Hematological: Does not bruise/bleed easily  Psychiatric/Behavioral: Negative for behavioral problems and confusion  Objective:    /84   Pulse 64   Temp (!) 96 1 °F (35 6 °C) (Temporal)   Ht 5' 8" (1 727 m)   Wt 105 kg (231 lb 6 4 oz)   BMI 35 18 kg/m²      Physical Exam  Vitals and nursing note reviewed  Constitutional:       General: He is not in acute distress  Appearance: He is well-developed  He is not ill-appearing  HENT:      Head: Normocephalic and atraumatic  Eyes:      General:         Right eye: No discharge  Left eye: No discharge  Conjunctiva/sclera: Conjunctivae normal    Neck:      Trachea: No tracheal deviation  Cardiovascular:      Rate and Rhythm: Normal rate and regular rhythm  Heart sounds: No murmur heard  Pulmonary:      Effort: Pulmonary effort is normal  No respiratory distress  Breath sounds: Normal breath sounds  No wheezing, rhonchi or rales  Abdominal:      General: There is no distension  Palpations: Abdomen is soft  Tenderness: There is no abdominal tenderness  There is no guarding or rebound  Musculoskeletal:         General: No deformity or signs of injury  Cervical back: Neck supple  Skin:     General: Skin is warm and dry  Coloration: Skin is not pale  Findings: No rash  Neurological:      General: No focal deficit present  Mental Status: He is alert  Motor: No abnormal muscle tone  Gait: Gait normal    Psychiatric:         Mood and Affect: Mood normal          Behavior: Behavior normal          Thought Content:  Thought content normal          Judgment: Judgment normal

## 2021-08-20 DIAGNOSIS — I10 ESSENTIAL HYPERTENSION: ICD-10-CM

## 2021-08-20 RX ORDER — LOSARTAN POTASSIUM 25 MG/1
TABLET ORAL
Qty: 90 TABLET | Refills: 1 | Status: SHIPPED | OUTPATIENT
Start: 2021-08-20 | End: 2022-01-27 | Stop reason: SDUPTHER

## 2021-09-03 DIAGNOSIS — I10 ESSENTIAL HYPERTENSION: ICD-10-CM

## 2021-09-03 RX ORDER — AMLODIPINE BESYLATE 5 MG/1
TABLET ORAL
Qty: 90 TABLET | Refills: 1 | Status: SHIPPED | OUTPATIENT
Start: 2021-09-03 | End: 2022-03-13

## 2021-09-13 DIAGNOSIS — E78.5 DYSLIPIDEMIA: ICD-10-CM

## 2021-09-14 RX ORDER — ATORVASTATIN CALCIUM 20 MG/1
20 TABLET, FILM COATED ORAL DAILY
Qty: 90 TABLET | Refills: 0 | Status: SHIPPED | OUTPATIENT
Start: 2021-09-14 | End: 2021-12-13 | Stop reason: SDUPTHER

## 2021-10-01 NOTE — PROGRESS NOTES
Assessment/Plan:    Hypertension  Bp at goal, con't current meds       Diagnoses and all orders for this visit:    Essential hypertension  -     amLODIPine (NORVASC) 5 mg tablet; Take 1 tablet (5 mg total) by mouth daily for 90 days    Left knee pain, unspecified chronicity  Comments:  Saw Ortho and had Xray - mild effusion only, had injection with short lived benefit, pain limiting activities, will check MRI to eval for MMT - will prior auth, encourage RICE, call with new/worse symptoms  Orders:  -     MRI knee left  wo contrast; Future       5/18    [facility] 2016 - 10 yr follow up    Subjective:      Patient ID: Quillian Simmonds is a 57 y o  male  HPI Pt here for BP check  At last visit in July pts BP was improved with recent changes in BP regimen  BP at goal today and meds were reviewed and no changes have occurred  He denies missing doses of meds or SE with the meds  He does check his BP outside the office - averages 130's/80's  He notes no frequent Ha's/dizziness/double vision/CP  Pt notes "there is something seriously wrong with my knee"  He has had L knee pain since July  Twisted knee getting out of car and has pain intermittently since then  He has L knee pain with flexing at the knee  He has no pain with standing  He notes no locking up or giving out  He notes no significant swelling or redness  He has seen Ortho and had an injection and had benefit with the shot but it was short lived   5/18    [facility] 2016 - 10 yr follow up     He has had his flu vaccine this year already    Review of Systems   Constitutional: Negative for chills, fatigue and fever  HENT: Negative for congestion and sinus pain  Eyes: Negative for pain and redness  Respiratory: Negative for cough, chest tightness and shortness of breath  Cardiovascular: Negative for chest pain, palpitations and leg swelling  Gastrointestinal: Negative for abdominal pain, diarrhea, nausea and vomiting     Genitourinary: NURSING INTAKE COMMENTS: Patient presents with:  Knee Pain: follow up left knee, compliat with brace, denies pain      HPI: This 4year old female presents today with complaints of left knee injury follow-up. Its been 5 weeks since time of injury.   She ha Negative for difficulty urinating and dysuria  Musculoskeletal: Positive for arthralgias  Negative for myalgias  Skin: Negative for rash and wound  Neurological: Negative for dizziness and headaches  Hematological: Does not bruise/bleed easily  Psychiatric/Behavioral: Negative for behavioral problems and confusion  Objective:    /82   Pulse 78   Temp 97 8 °F (36 6 °C)   Ht 5' 8" (1 727 m)   Wt 105 kg (232 lb)   BMI 35 28 kg/m²      Physical Exam   Constitutional: He appears well-developed and well-nourished  No distress  HENT:   Head: Normocephalic and atraumatic  Mouth/Throat: No oropharyngeal exudate  Eyes: Conjunctivae are normal  Right eye exhibits no discharge  Left eye exhibits no discharge  Neck: Neck supple  No tracheal deviation present  Cardiovascular: Normal rate and regular rhythm  No murmur heard  Pulmonary/Chest: Effort normal and breath sounds normal  No respiratory distress  He has no wheezes  He has no rales  Abdominal: Soft  He exhibits no distension  There is no tenderness  Musculoskeletal: He exhibits no deformity  Nml gait  L knee: + crepitus with PROM, neg ant/post drawer, + valgus stress pain, - varus stress, no significant effusion   Neurological: He is alert  He exhibits normal muscle tone  Skin: Skin is warm and dry  No rash noted  Psychiatric: He has a normal mood and affect  His behavior is normal    Nursing note and vitals reviewed  [address] [place] [address] Dr Tidwell, CPT=[id number])  COMPARISON: [facility], XR TIBIA + visit today. Follow Up: Return in about 3 weeks (around 10/22/2021).     Eddi Koo MD

## 2021-10-26 ENCOUNTER — TELEPHONE (OUTPATIENT)
Dept: GASTROENTEROLOGY | Facility: CLINIC | Age: 66
End: 2021-10-26

## 2021-11-10 ENCOUNTER — TELEPHONE (OUTPATIENT)
Dept: FAMILY MEDICINE CLINIC | Facility: HOSPITAL | Age: 66
End: 2021-11-10

## 2021-12-06 ENCOUNTER — IMMUNIZATIONS (OUTPATIENT)
Dept: FAMILY MEDICINE CLINIC | Facility: HOSPITAL | Age: 66
End: 2021-12-06

## 2021-12-06 DIAGNOSIS — Z23 ENCOUNTER FOR IMMUNIZATION: Primary | ICD-10-CM

## 2021-12-06 PROCEDURE — 0001A COVID-19 PFIZER VACC 0.3 ML: CPT

## 2021-12-06 PROCEDURE — 91300 COVID-19 PFIZER VACC 0.3 ML: CPT

## 2021-12-13 DIAGNOSIS — E78.5 DYSLIPIDEMIA: ICD-10-CM

## 2021-12-13 RX ORDER — ATORVASTATIN CALCIUM 20 MG/1
20 TABLET, FILM COATED ORAL DAILY
Qty: 90 TABLET | Refills: 0 | Status: SHIPPED | OUTPATIENT
Start: 2021-12-13 | End: 2022-06-18

## 2021-12-22 ENCOUNTER — TELEPHONE (OUTPATIENT)
Dept: FAMILY MEDICINE CLINIC | Facility: HOSPITAL | Age: 66
End: 2021-12-22

## 2021-12-22 PROCEDURE — U0005 INFEC AGEN DETEC AMPLI PROBE: HCPCS | Performed by: FAMILY MEDICINE

## 2021-12-22 PROCEDURE — U0003 INFECTIOUS AGENT DETECTION BY NUCLEIC ACID (DNA OR RNA); SEVERE ACUTE RESPIRATORY SYNDROME CORONAVIRUS 2 (SARS-COV-2) (CORONAVIRUS DISEASE [COVID-19]), AMPLIFIED PROBE TECHNIQUE, MAKING USE OF HIGH THROUGHPUT TECHNOLOGIES AS DESCRIBED BY CMS-2020-01-R: HCPCS | Performed by: FAMILY MEDICINE

## 2021-12-28 ENCOUNTER — TELEMEDICINE (OUTPATIENT)
Dept: FAMILY MEDICINE CLINIC | Facility: HOSPITAL | Age: 66
End: 2021-12-28
Payer: COMMERCIAL

## 2021-12-28 VITALS — TEMPERATURE: 98.6 F | BODY MASS INDEX: 35.61 KG/M2 | WEIGHT: 235 LBS | HEIGHT: 68 IN

## 2021-12-28 DIAGNOSIS — B34.9 VIRAL INFECTION, UNSPECIFIED: Primary | ICD-10-CM

## 2021-12-28 PROCEDURE — 99213 OFFICE O/P EST LOW 20 MIN: CPT | Performed by: INTERNAL MEDICINE

## 2021-12-29 PROCEDURE — 87636 SARSCOV2 & INF A&B AMP PRB: CPT | Performed by: INTERNAL MEDICINE

## 2022-01-03 ENCOUNTER — TELEPHONE (OUTPATIENT)
Dept: FAMILY MEDICINE CLINIC | Facility: HOSPITAL | Age: 67
End: 2022-01-03

## 2022-01-03 LAB
FLUAV RNA RESP QL NAA+PROBE: NEGATIVE
FLUBV RNA RESP QL NAA+PROBE: NEGATIVE
SARS-COV-2 RNA RESP QL NAA+PROBE: NEGATIVE

## 2022-01-03 NOTE — TELEPHONE ENCOUNTER
Pt aware results not back    States that his visit w/ Dr Narayan Tidwell on the 28th is showing an outstanding balance of $69 and change  He isn't sure what this charge is for  Can one of you look into this?  Thanks

## 2022-01-05 NOTE — TELEPHONE ENCOUNTER
Requested this date of service to be submitted to [place] [facility]  Not sure why is wasn't done in the first place  Message left for patient to make him aware to disregard bill at this time

## 2022-01-20 ENCOUNTER — RA CDI HCC (OUTPATIENT)
Dept: OTHER | Facility: HOSPITAL | Age: 67
End: 2022-01-20

## 2022-01-20 NOTE — PROGRESS NOTES
Lety [facility]  coding opportunities       Chart reviewed, no opportunity found: CHART REVIEWED, NO OPPORTUNITY FOUND                        Patients insurance company: [employer] (Medicare Advantage and Commercial)

## 2022-01-24 LAB
ALBUMIN SERPL-MCNC: 4.6 G/DL (ref 3.8–4.8)
ALBUMIN/GLOB SERPL: 1.8 {RATIO} (ref 1.2–2.2)
ALP SERPL-CCNC: 56 IU/L (ref 44–121)
ALT SERPL-CCNC: 19 IU/L (ref 0–44)
AST SERPL-CCNC: 21 IU/L (ref 0–40)
BASOPHILS # BLD AUTO: 0.1 X10E3/UL (ref 0–0.2)
BASOPHILS NFR BLD AUTO: 1 %
BILIRUB SERPL-MCNC: 0.7 MG/DL (ref 0–1.2)
BUN SERPL-MCNC: 20 MG/DL (ref 8–27)
BUN/CREAT SERPL: 18 (ref 10–24)
CALCIUM SERPL-MCNC: 9.5 MG/DL (ref 8.6–10.2)
CHLORIDE SERPL-SCNC: 99 MMOL/L (ref 96–106)
CHOLEST SERPL-MCNC: 175 MG/DL (ref 100–199)
CHOLEST/HDLC SERPL: 3.4 RATIO (ref 0–5)
CO2 SERPL-SCNC: 23 MMOL/L (ref 20–29)
CREAT SERPL-MCNC: 1.14 MG/DL (ref 0.76–1.27)
EOSINOPHIL # BLD AUTO: 0.1 X10E3/UL (ref 0–0.4)
EOSINOPHIL NFR BLD AUTO: 2 %
ERYTHROCYTE [DISTWIDTH] IN BLOOD BY AUTOMATED COUNT: 13.1 % (ref 11.6–15.4)
GLOBULIN SER-MCNC: 2.6 G/DL (ref 1.5–4.5)
GLUCOSE SERPL-MCNC: 94 MG/DL (ref 65–99)
HCT VFR BLD AUTO: 49 % (ref 37.5–51)
HDLC SERPL-MCNC: 51 MG/DL
HGB BLD-MCNC: 16.8 G/DL (ref 13–17.7)
IMM GRANULOCYTES # BLD: 0 X10E3/UL (ref 0–0.1)
IMM GRANULOCYTES NFR BLD: 0 %
LDLC SERPL CALC-MCNC: 108 MG/DL (ref 0–99)
LYMPHOCYTES # BLD AUTO: 1.9 X10E3/UL (ref 0.7–3.1)
LYMPHOCYTES NFR BLD AUTO: 30 %
MCH RBC QN AUTO: 29.5 PG (ref 26.6–33)
MCHC RBC AUTO-ENTMCNC: 34.3 G/DL (ref 31.5–35.7)
MCV RBC AUTO: 86 FL (ref 79–97)
MONOCYTES # BLD AUTO: 0.4 X10E3/UL (ref 0.1–0.9)
MONOCYTES NFR BLD AUTO: 7 %
NEUTROPHILS # BLD AUTO: 3.9 X10E3/UL (ref 1.4–7)
NEUTROPHILS NFR BLD AUTO: 60 %
PLATELET # BLD AUTO: 189 X10E3/UL (ref 150–450)
POTASSIUM SERPL-SCNC: 4.2 MMOL/L (ref 3.5–5.2)
PROT SERPL-MCNC: 7.2 G/DL (ref 6–8.5)
RBC # BLD AUTO: 5.7 X10E6/UL (ref 4.14–5.8)
SL AMB EGFR AFRICAN AMERICAN: 76 ML/MIN/1.73
SL AMB EGFR NON AFRICAN AMERICAN: 66 ML/MIN/1.73
SL AMB VLDL CHOLESTEROL CALC: 16 MG/DL (ref 5–40)
SODIUM SERPL-SCNC: 139 MMOL/L (ref 134–144)
TRIGL SERPL-MCNC: 86 MG/DL (ref 0–149)
TSH SERPL DL<=0.005 MIU/L-ACNC: 3.04 UIU/ML (ref 0.45–4.5)
WBC # BLD AUTO: 6.5 X10E3/UL (ref 3.4–10.8)

## 2022-01-25 LAB — LDH SERPL-CCNC: 221 IU/L (ref 121–224)

## 2022-01-27 ENCOUNTER — OFFICE VISIT (OUTPATIENT)
Dept: FAMILY MEDICINE CLINIC | Facility: HOSPITAL | Age: 67
End: 2022-01-27
Payer: COMMERCIAL

## 2022-01-27 VITALS
HEIGHT: 68 IN | SYSTOLIC BLOOD PRESSURE: 148 MMHG | HEART RATE: 68 BPM | DIASTOLIC BLOOD PRESSURE: 90 MMHG | BODY MASS INDEX: 36.1 KG/M2 | WEIGHT: 238.2 LBS | TEMPERATURE: 97.4 F

## 2022-01-27 DIAGNOSIS — I10 PRIMARY HYPERTENSION: ICD-10-CM

## 2022-01-27 DIAGNOSIS — E66.01 SEVERE OBESITY (BMI 35.0-39.9) WITH COMORBIDITY (HCC): ICD-10-CM

## 2022-01-27 DIAGNOSIS — Z12.11 SCREENING FOR COLON CANCER: ICD-10-CM

## 2022-01-27 DIAGNOSIS — I10 ESSENTIAL HYPERTENSION: ICD-10-CM

## 2022-01-27 DIAGNOSIS — C85.91 LYMPHOMA OF LYMPH NODES OF HEAD AND NECK REGION (HCC): ICD-10-CM

## 2022-01-27 DIAGNOSIS — Z00.00 MEDICARE ANNUAL WELLNESS VISIT, SUBSEQUENT: ICD-10-CM

## 2022-01-27 DIAGNOSIS — E78.5 DYSLIPIDEMIA: Primary | ICD-10-CM

## 2022-01-27 DIAGNOSIS — R73.9 HYPERGLYCEMIA: ICD-10-CM

## 2022-01-27 PROCEDURE — G0439 PPPS, SUBSEQ VISIT: HCPCS | Performed by: INTERNAL MEDICINE

## 2022-01-27 PROCEDURE — 99214 OFFICE O/P EST MOD 30 MIN: CPT | Performed by: INTERNAL MEDICINE

## 2022-01-27 PROCEDURE — 1160F RVW MEDS BY RX/DR IN RCRD: CPT | Performed by: INTERNAL MEDICINE

## 2022-01-27 PROCEDURE — 1125F AMNT PAIN NOTED PAIN PRSNT: CPT | Performed by: INTERNAL MEDICINE

## 2022-01-27 PROCEDURE — 1101F PT FALLS ASSESS-DOCD LE1/YR: CPT | Performed by: INTERNAL MEDICINE

## 2022-01-27 PROCEDURE — 3725F SCREEN DEPRESSION PERFORMED: CPT | Performed by: INTERNAL MEDICINE

## 2022-01-27 PROCEDURE — 1170F FXNL STATUS ASSESSED: CPT | Performed by: INTERNAL MEDICINE

## 2022-01-27 PROCEDURE — 1036F TOBACCO NON-USER: CPT | Performed by: INTERNAL MEDICINE

## 2022-01-27 PROCEDURE — 3080F DIAST BP >= 90 MM HG: CPT | Performed by: INTERNAL MEDICINE

## 2022-01-27 PROCEDURE — 3288F FALL RISK ASSESSMENT DOCD: CPT | Performed by: INTERNAL MEDICINE

## 2022-01-27 PROCEDURE — 3077F SYST BP >= 140 MM HG: CPT | Performed by: INTERNAL MEDICINE

## 2022-01-27 PROCEDURE — 3008F BODY MASS INDEX DOCD: CPT | Performed by: INTERNAL MEDICINE

## 2022-01-27 RX ORDER — HYDROCHLOROTHIAZIDE 25 MG/1
TABLET ORAL
Qty: 90 TABLET | Refills: 1 | Status: SHIPPED | OUTPATIENT
Start: 2022-01-27 | End: 2022-08-05

## 2022-01-27 RX ORDER — LOSARTAN POTASSIUM 50 MG/1
50 TABLET ORAL DAILY
Qty: 90 TABLET | Refills: 1 | Status: SHIPPED | OUTPATIENT
Start: 2022-01-27

## 2022-01-27 NOTE — PROGRESS NOTES
Assessment/Plan:    Hyperglycemia  BS wnl, con't annual labs and healthy diet/exercise/wgt loss encouraged    Dyslipidemia  LDL a bit elevated but rest of panel wnl, urged diet/exercise/wgt loss, recheck annually, con't current statin for now    Hypertension  BP not at goal, increase Losartan to 50 mg q day, con't Amlodipine and HCTZ, diet/exercise/wgt loss encouraged, recheck in 4-6 wks    Lymphoma, head, face, or neck  Reassured CBC/LDH wnl, call with any concerning fatigue/night sweats/wgt loss/adenopathy       Diagnoses and all orders for this visit:    Dyslipidemia    Hyperglycemia    Primary hypertension    Essential hypertension  -     losartan (COZAAR) 50 mg tablet; Take 1 tablet (50 mg total) by mouth daily    Lymphoma, head, face, or neck    Medicare annual wellness visit, subsequent    Severe obesity (BMI 35 0-39  9) with comorbidity ([facility] )    BMI 36 0-36 9,adult    Screening for colon cancer  -     Ambulatory Referral to Gastroenterology; Future      Colonoscopy 1/16 - 5 yrs - number GI given    Shringrix encouraged        Subjective:      Patient ID: Kimberlee Nicole is a 74 y o  male  HPI Pt here for follow up appt/BW results and AWV    BW results were d/w pt in detail: FLP with , TC/TG/HLD as well as CBC/CMP/TSH/LDH were all wnl  Def of nml vs IFG vs DM was d/w pt in detail  Diet/exercise was reviewed - wgt up 7 lbs from Aug 2021  He admits sweets were bad with the holidays  He does no formal exercise  BMI reviewed  Goal FLP was d/w pt in detail  Diet/exercise reviewed as noted above  He is taking his Atorvastatin daily as directed w/o SE  He notes no stroke/TIA symptoms/CP  BP a bit above goal today and meds were reviewed and no changes have occurred  He denies missing doses of meds on a regular basis but states "I might miss one every 2 wks or so"  He notes no SE with the meds  He does not check his BP outside the office    He notes no frequent HA's/dizziness/double vision/CP  Pt had COVID symptoms in Dec and was tested and results came back NEGATIVE  He states symptoms lasted a few days and resolved  He notes no residual symptoms  He is vaccinated and boosted  Nml LDH reviewed  Pt voices no concerns for recurrent of his lymphoma  NO longer following with Heme/Onc        Review of Systems   Constitutional: Negative for chills and fever  HENT: Negative for congestion and trouble swallowing  Eyes: Negative for pain and visual disturbance  Respiratory: Negative for cough, shortness of breath and wheezing  Cardiovascular: Negative for chest pain, palpitations and leg swelling  Gastrointestinal: Negative for abdominal pain, blood in stool, constipation, diarrhea, nausea and vomiting  Endocrine: Negative for polydipsia and polyuria  Genitourinary: Negative for difficulty urinating, dysuria and hematuria  Musculoskeletal: Positive for arthralgias  Negative for myalgias  Skin: Negative for rash and wound  Neurological: Negative for dizziness and headaches  Hematological: Does not bruise/bleed easily  Psychiatric/Behavioral: Negative for behavioral problems, confusion and dysphoric mood  Objective:    /90   Pulse 68   Temp (!) 97 4 °F (36 3 °C) (Tympanic)   Ht 5' 8" (1 727 m)   Wt 108 kg (238 lb 3 2 oz)   BMI 36 22 kg/m²      Physical Exam  Vitals and nursing note reviewed  Constitutional:       General: He is not in acute distress  Appearance: He is well-developed  He is not ill-appearing  HENT:      Head: Normocephalic and atraumatic  Right Ear: Tympanic membrane and external ear normal  There is no impacted cerumen  Left Ear: Tympanic membrane and external ear normal  There is no impacted cerumen  Eyes:      General:         Right eye: No discharge  Left eye: No discharge  Conjunctiva/sclera: Conjunctivae normal    Neck:      Vascular: No carotid bruit  Trachea: No tracheal deviation  Cardiovascular:      Rate and Rhythm: Normal rate and regular rhythm  Heart sounds: No murmur heard  Pulmonary:      Effort: Pulmonary effort is normal  No respiratory distress  Breath sounds: Normal breath sounds  No wheezing, rhonchi or rales  Abdominal:      General: There is no distension  Palpations: Abdomen is soft  Tenderness: There is no abdominal tenderness  There is no guarding or rebound  Musculoskeletal:         General: No deformity or signs of injury  Cervical back: Neck supple  Lymphadenopathy:      Cervical: No cervical adenopathy  Skin:     General: Skin is warm and dry  Coloration: Skin is not pale  Findings: No rash  Neurological:      General: No focal deficit present  Mental Status: He is alert  Mental status is at baseline  Motor: No abnormal muscle tone  Gait: Gait normal    Psychiatric:         Mood and Affect: Mood normal          Behavior: Behavior normal          Thought Content:  Thought content normal          Judgment: Judgment normal

## 2022-01-27 NOTE — ASSESSMENT & PLAN NOTE
BP not at goal, increase Losartan to 50 mg q day, con't Amlodipine and HCTZ, diet/exercise/wgt loss encouraged, recheck in 4-6 wks

## 2022-01-27 NOTE — ASSESSMENT & PLAN NOTE
LDL a bit elevated but rest of panel wnl, urged diet/exercise/wgt loss, recheck annually, con't current statin for now

## 2022-01-27 NOTE — PATIENT INSTRUCTIONS

## 2022-01-27 NOTE — PROGRESS NOTES
Assessment and Plan:     Problem List Items Addressed This Visit        Other    Dyslipidemia - Primary    Hyperglycemia     BS wnl, con't annual labs and healthy diet/exercise/wgt loss encouraged             BMI Counseling: Body mass index is 36 22 kg/m²  The BMI is above normal  Nutrition recommendations include decreasing portion sizes, encouraging healthy choices of fruits and vegetables, consuming healthier snacks, moderation in carbohydrate intake, increasing intake of lean protein and reducing intake of saturated and trans fat  Exercise recommendations include exercising 3-5 times per week  No pharmacotherapy was ordered  Rationale for BMI follow-up plan is due to patient being overweight or obese  Depression Screening and Follow-up Plan: Patient was screened for depression during today's encounter  They screened negative with a PHQ-2 score of 0  Preventive health issues were discussed with patient, and age appropriate screening tests were ordered as noted in patient's After Visit Summary  Personalized health advice and appropriate referrals for health education or preventive services given if needed, as noted in patient's After Visit Summary       History of Present Illness:     Patient presents for Medicare Annual Wellness visit    Patient Care Team:  Rankin Angelucci, DO as PCP - General  Yoanna Callaway MD (Dermatology)     Problem List:     Patient Active Problem List   Diagnosis    Apnea    Dyslipidemia    Hypertension    Lymphoma, head, face, or neck    Macular degeneration    Obesity    Serrated adenoma of colon    Umbilical hernia    Primary osteoarthritis of both knees    Hyperglycemia    Chronic pain of both knees      Past Medical and Surgical History:     Past Medical History:   Diagnosis Date    Hypertension 2016     Past Surgical History:   Procedure Laterality Date    COLONOSCOPY  01/18/2016    complete - serrted adenoma    CYST REMOVAL     [place] DENTAL SURGERY  2017    Root canals due to cancer    INCISION AND DRAINAGE ABSCESS / HEMATOMA OF BURSA / KNEE / THIGH  02/09/2015    rigjt upper, inner arm abscess    PORTACATH PLACEMENT  2011    And removed      NE KNEE SCOPE,MED/LAT MENISECTOMY Left 11/19/2018    Procedure: ARTHROSCOPY KNEE PARTIAL  LATERAL MENISECTOMY;  Surgeon: Kayce Washington MD;  Location:  MAIN OR;  Service: Orthopedics    TONSILLECTOMY      As kid   [place]  VASECTOMY  1978      Family History:     Family History   Problem Relation Age of Onset    Atrial fibrillation Mother     COPD Father     Rheumatic fever Father     Glaucoma Father     Thyroid disease Sister     Cancer Brother     Hypertension Son     Cancer Brother     Diverticulitis Son       Social History:     Social History     Socioeconomic History    Marital status: /Civil Union     Spouse name: None    Number of children: None    Years of education: None    Highest education level: None   Occupational History     Comment: full time employment    Tobacco Use    Smoking status: Never Smoker    Smokeless tobacco: Never Used   Vaping Use    Vaping Use: Never used   Substance and Sexual Activity    Alcohol use: Yes     Comment: 1 x week for 9 months/ year    Drug use: No    Sexual activity: Never     Comment: Resides with Jocy Lynch (Wife)   Other Topics Concern    None   Social History Narrative         Social Determinants of Health     Financial Resource Strain: Not on file   Food Insecurity: Not on file   Transportation Needs: Not on file   Physical Activity: Not on file   Stress: Not on file   Social Connections: Not on file   Intimate Partner Violence: Not on file   Housing Stability: Not on file      Medications and Allergies:     Current Outpatient Medications   Medication Sig Dispense Refill    amLODIPine (NORVASC) 5 mg tablet TAKE 1 TABLET BY MOUTH EVERY DAY 90 tablet 1    atorvastatin (LIPITOR) 20 mg tablet Take 1 tablet (20 mg total) by mouth daily 90 tablet 0    hydrochlorothiazide (HYDRODIURIL) 25 mg tablet TAKE 1 TABLET BY MOUTH EVERY DAY 90 tablet 1    losartan (COZAAR) 25 mg tablet TAKE 1 TABLET BY MOUTH EVERY DAY 90 tablet 1     No current facility-administered medications for this visit  No Known Allergies   Immunizations:     Immunization History   Administered Date(s) Administered    COVID-19 PFIZER VACCINE 0 3 ML IM 03/09/2021, 03/29/2021, 12/06/2021    INFLUENZA 10/15/2021    Influenza, high dose seasonal 0 7 mL 12/16/2019, 10/09/2020    Influenza, injectable, quadrivalent, preservative free 0 5 mL 10/03/2018    Pneumococcal Conjugate 13-Valent 12/16/2019    Pneumococcal Polysaccharide PPV23 01/12/2021    Tdap 01/22/2016, 05/07/2016      Health Maintenance:         Topic Date Due    Hepatitis C Screening  Never done    Colorectal Cancer Screening  01/18/2021     There are no preventive care reminders to display for this patient  Medicare Health Risk Assessment:     There were no vitals taken for this visit  Mariana Hanks is here for his Subsequent Wellness visit  Last Medicare Wellness visit information reviewed, patient interviewed and updates made to the record today  Health Risk Assessment:   Patient rates overall health as good  Patient feels that their physical health rating is same  Patient is satisfied with their life  Eyesight was rated as slightly worse  Hearing was rated as same  Patient feels that their emotional and mental health rating is same  Patients states they are never, rarely angry  Patient states they are sometimes unusually tired/fatigued  Pain experienced in the last 7 days has been none  Patient states that he has experienced no weight loss or gain in last 6 months  Vision slightly worse with "fluid collections" in eyes - following with optho regularly    Depression Screening:   PHQ-2 Score: 0      Fall Risk Screening:    In the past year, patient has experienced: no history of falling in past year      Home Safety:  Patient has trouble with stairs inside or outside of their home  Patient has working smoke alarms and has working carbon monoxide detector  Home safety hazards include: none  Nutrition:   Current diet is Regular  Medications:   Patient is currently taking over-the-counter supplements  OTC medications include: see medication list  Patient is able to manage medications  Activities of Daily Living (ADLs)/Instrumental Activities of Daily Living (IADLs):   Walk and transfer into and out of bed and chair?: Yes  Dress and groom yourself?: Yes    Bathe or shower yourself?: Yes    Feed yourself? Yes  Do your laundry/housekeeping?: Yes  Manage your money, pay your bills and track your expenses?: Yes  Make your own meals?: Yes    Do your own shopping?: Yes    Previous Hospitalizations:   Any hospitalizations or ED visits within the last 12 months?: No      Advance Care Planning:   Living will: Yes    Durable POA for healthcare:  Yes    Advanced directive: Yes      Cognitive Screening:   Provider or family/friend/caregiver concerned regarding cognition?: No    PREVENTIVE SCREENINGS      Cardiovascular Screening:    General: Screening Current, Risks and Benefits Discussed and History Lipid Disorder      Diabetes Screening:     General: Screening Current and Risks and Benefits Discussed      Colorectal Cancer Screening:     General: Screening Current    Due for: Colonoscopy - High Risk      Prostate Cancer Screening:    General: Screening Current and Risks and Benefits Discussed      Osteoporosis Screening:    General: Risks and Benefits Discussed and Screening Not Indicated      Abdominal Aortic Aneurysm (AAA) Screening:    Risk factors include: age between 73-69 yo        General: Risks and Benefits Discussed and Screening Not Indicated      Lung Cancer Screening:     General: Screening Not Indicated and Risks and Benefits Discussed      Hepatitis C Screening:    General: Risks and Benefits Discussed    Hep C Screening Accepted: Yes      Screening, Brief Intervention, and Referral to Treatment (SBIRT)    Screening  Typical number of drinks in a day: 0  Typical number of drinks in a week: 0  Interpretation: Low risk drinking behavior  Single Item Drug Screening:  How often have you used an illegal drug (including marijuana) or a prescription medication for non-medical reasons in the past year? never    Single Item Drug Screen Score: 0  Interpretation: Negative screen for possible drug use disorder    Other Counseling Topics:   Car/seat belt/driving safety and regular weightbearing exercise         Bubba Rowe, DO

## 2022-02-17 ENCOUNTER — TELEPHONE (OUTPATIENT)
Dept: GASTROENTEROLOGY | Facility: CLINIC | Age: 67
End: 2022-02-17

## 2022-02-25 ENCOUNTER — RA CDI HCC (OUTPATIENT)
Dept: OTHER | Facility: HOSPITAL | Age: 67
End: 2022-02-25

## 2022-02-25 NOTE — PROGRESS NOTES
[facility]  coding opportunities       Chart reviewed, no opportunity found: CHART REVIEWED, NO OPPORTUNITY FOUND                        Patients insurance company: [employer] (Medicare Advantage and Commercial)

## 2022-03-04 ENCOUNTER — OFFICE VISIT (OUTPATIENT)
Dept: FAMILY MEDICINE CLINIC | Facility: HOSPITAL | Age: 67
End: 2022-03-04
Payer: COMMERCIAL

## 2022-03-04 VITALS
HEART RATE: 58 BPM | TEMPERATURE: 96.2 F | HEIGHT: 68 IN | BODY MASS INDEX: 35.74 KG/M2 | SYSTOLIC BLOOD PRESSURE: 122 MMHG | WEIGHT: 235.8 LBS | DIASTOLIC BLOOD PRESSURE: 80 MMHG

## 2022-03-04 DIAGNOSIS — I10 PRIMARY HYPERTENSION: Primary | ICD-10-CM

## 2022-03-04 DIAGNOSIS — R07.9 CHEST PAIN, UNSPECIFIED TYPE: ICD-10-CM

## 2022-03-04 PROCEDURE — 99214 OFFICE O/P EST MOD 30 MIN: CPT | Performed by: INTERNAL MEDICINE

## 2022-03-04 PROCEDURE — 3074F SYST BP LT 130 MM HG: CPT | Performed by: INTERNAL MEDICINE

## 2022-03-04 PROCEDURE — 3008F BODY MASS INDEX DOCD: CPT | Performed by: INTERNAL MEDICINE

## 2022-03-04 PROCEDURE — 1160F RVW MEDS BY RX/DR IN RCRD: CPT | Performed by: INTERNAL MEDICINE

## 2022-03-04 PROCEDURE — 3079F DIAST BP 80-89 MM HG: CPT | Performed by: INTERNAL MEDICINE

## 2022-03-04 PROCEDURE — 1036F TOBACCO NON-USER: CPT | Performed by: INTERNAL MEDICINE

## 2022-03-04 PROCEDURE — 93000 ELECTROCARDIOGRAM COMPLETE: CPT | Performed by: INTERNAL MEDICINE

## 2022-03-04 NOTE — PROGRESS NOTES
Assessment/Plan:    Hypertension  BP great today with recent med changes, con't current regimen, recheck in 4 mos       Diagnoses and all orders for this visit:    Primary hypertension    Chest pain, unspecified type  Comments:  Reassured pt that s/sx sound more c/w anxiety, ECG in office today w/o ischemia or arrhythmia (LAD and sinus farheen was noted), red flag CV symptoms reviewed and urged to go to ED if they occur, also urged to call with persistent symptoms - if occurs would need further CV eval with stress test, on ARB and statin  Orders:  -     POCT ECG      Colonoscopy 1/16 - 5 yrs - has scheduled for April 2022    BW 1/22 - annually      Subjective:      Patient ID: Gloria Malone is a 74 y o  male  HPI Pt here for BP check  Last visit in Jan pts BP was elevated and we subsequently increased his losartan to 50 mg 1 tab PO q day  He was told to con't his current Amlodipine and HCTZ  He is here for a BP/med check  BP at goal today and meds were reviewed and med list is UTD  Wgt down 3 lbs from Jan 2022  He denies missing doses of meds or SE with the meds/increase in Losartan  He does not check his BP outside the office  He notes no frequent Ha's/dizziness/double vision  He has had some CP for about 3 wks  The pain is intermittent and is more frequent with the cold and when he is anxious  The pain is described as pressure and is in the center of the chest   The pain does not radiate  He notes no associated SOB/dizziness/N/V/diaphoresis  He notes no exertional symptoms  He denies reflux or cough/recent resp illness  Review of Systems   Constitutional: Negative for chills and fever  Eyes: Negative for pain and visual disturbance  Respiratory: Negative for cough and shortness of breath  Cardiovascular: Positive for chest pain  Negative for palpitations and leg swelling     Gastrointestinal: Negative for abdominal pain, blood in stool, constipation, diarrhea, nausea and vomiting  Musculoskeletal: Negative for arthralgias and myalgias  Skin: Negative for rash and wound  Neurological: Negative for dizziness and headaches  Hematological: Does not bruise/bleed easily  Psychiatric/Behavioral: Negative for behavioral problems and confusion  Objective:    /80   Pulse 58   Temp (!) 96 2 °F (35 7 °C)   Ht 5' 8" (1 727 m)   Wt 107 kg (235 lb 12 8 oz)   BMI 35 85 kg/m²      Physical Exam  Vitals and nursing note reviewed  Constitutional:       General: He is not in acute distress  Appearance: He is well-developed  He is not ill-appearing  HENT:      Head: Normocephalic and atraumatic  Eyes:      General:         Right eye: No discharge  Left eye: No discharge  Conjunctiva/sclera: Conjunctivae normal    Neck:      Trachea: No tracheal deviation  Cardiovascular:      Rate and Rhythm: Normal rate and regular rhythm  Heart sounds: No murmur heard  Pulmonary:      Effort: Pulmonary effort is normal  No respiratory distress  Breath sounds: Normal breath sounds  No wheezing, rhonchi or rales  Abdominal:      General: There is no distension  Palpations: Abdomen is soft  Tenderness: There is no abdominal tenderness  There is no guarding or rebound  Musculoskeletal:         General: No deformity or signs of injury  Cervical back: Neck supple  Lymphadenopathy:      Cervical: No cervical adenopathy  Skin:     General: Skin is warm and dry  Coloration: Skin is not pale  Findings: No rash  Neurological:      General: No focal deficit present  Mental Status: He is alert  Mental status is at baseline  Motor: No abnormal muscle tone  Gait: Gait normal    Psychiatric:         Mood and Affect: Mood normal          Behavior: Behavior normal          Thought Content:  Thought content normal          Judgment: Judgment normal

## 2022-03-13 DIAGNOSIS — I10 ESSENTIAL HYPERTENSION: ICD-10-CM

## 2022-03-13 RX ORDER — AMLODIPINE BESYLATE 5 MG/1
TABLET ORAL
Qty: 90 TABLET | Refills: 1 | Status: SHIPPED | OUTPATIENT
Start: 2022-03-13 | End: 2022-08-02 | Stop reason: SDUPTHER

## 2022-03-25 DIAGNOSIS — Z12.11 ENCOUNTER FOR SCREENING COLONOSCOPY: Primary | ICD-10-CM

## 2022-03-25 NOTE — TELEPHONE ENCOUNTER
Scheduled date of colonoscopy (as of today):4/4/22   Physician performing colonoscopy:Dr José Silva  Location of colonoscopy:[facility]  Bowel prep reviewed with patient:Coljosh  Instructions reviewed with patient by: Mercedes Subramanian  Clearances: No

## 2022-04-04 ENCOUNTER — ANESTHESIA (OUTPATIENT)
Dept: GASTROENTEROLOGY | Facility: AMBULATORY SURGERY CENTER | Age: 67
End: 2022-04-04

## 2022-04-04 ENCOUNTER — ANESTHESIA EVENT (OUTPATIENT)
Dept: GASTROENTEROLOGY | Facility: AMBULATORY SURGERY CENTER | Age: 67
End: 2022-04-04

## 2022-04-04 ENCOUNTER — HOSPITAL ENCOUNTER (OUTPATIENT)
Dept: GASTROENTEROLOGY | Facility: AMBULATORY SURGERY CENTER | Age: 67
Discharge: HOME | End: 2022-04-04
Payer: COMMERCIAL

## 2022-04-04 VITALS
SYSTOLIC BLOOD PRESSURE: 127 MMHG | HEART RATE: 58 BPM | RESPIRATION RATE: 22 BRPM | OXYGEN SATURATION: 96 % | DIASTOLIC BLOOD PRESSURE: 73 MMHG | TEMPERATURE: 97.4 F

## 2022-04-04 DIAGNOSIS — Z86.010 HISTORY OF COLON POLYPS: ICD-10-CM

## 2022-04-04 PROCEDURE — 45380 COLONOSCOPY AND BIOPSY: CPT | Performed by: INTERNAL MEDICINE

## 2022-04-04 PROCEDURE — 88305 TISSUE EXAM BY PATHOLOGIST: CPT | Performed by: PATHOLOGY

## 2022-04-04 RX ORDER — SODIUM CHLORIDE, SODIUM LACTATE, POTASSIUM CHLORIDE, CALCIUM CHLORIDE 600; 310; 30; 20 MG/100ML; MG/100ML; MG/100ML; MG/100ML
50 INJECTION, SOLUTION INTRAVENOUS CONTINUOUS
Status: DISCONTINUED | OUTPATIENT
Start: 2022-04-04 | End: 2022-04-08 | Stop reason: HOSPADM

## 2022-04-04 RX ORDER — PROPOFOL 10 MG/ML
INJECTION, EMULSION INTRAVENOUS CONTINUOUS PRN
Status: DISCONTINUED | OUTPATIENT
Start: 2022-04-04 | End: 2022-04-04

## 2022-04-04 RX ORDER — SODIUM CHLORIDE, SODIUM LACTATE, POTASSIUM CHLORIDE, CALCIUM CHLORIDE 600; 310; 30; 20 MG/100ML; MG/100ML; MG/100ML; MG/100ML
125 INJECTION, SOLUTION INTRAVENOUS CONTINUOUS
Status: DISCONTINUED | OUTPATIENT
Start: 2022-04-04 | End: 2022-04-08 | Stop reason: HOSPADM

## 2022-04-04 RX ORDER — LIDOCAINE HYDROCHLORIDE 10 MG/ML
INJECTION, SOLUTION EPIDURAL; INFILTRATION; INTRACAUDAL; PERINEURAL AS NEEDED
Status: DISCONTINUED | OUTPATIENT
Start: 2022-04-04 | End: 2022-04-04

## 2022-04-04 RX ORDER — LIDOCAINE HYDROCHLORIDE 10 MG/ML
0.5 INJECTION, SOLUTION EPIDURAL; INFILTRATION; INTRACAUDAL; PERINEURAL ONCE AS NEEDED
Status: DISCONTINUED | OUTPATIENT
Start: 2022-04-04 | End: 2022-04-08 | Stop reason: HOSPADM

## 2022-04-04 RX ORDER — PROPOFOL 10 MG/ML
INJECTION, EMULSION INTRAVENOUS AS NEEDED
Status: DISCONTINUED | OUTPATIENT
Start: 2022-04-04 | End: 2022-04-04

## 2022-04-04 RX ADMIN — PROPOFOL 120 MCG/KG/MIN: 10 INJECTION, EMULSION INTRAVENOUS at 08:10

## 2022-04-04 RX ADMIN — PROPOFOL 100 MG: 10 INJECTION, EMULSION INTRAVENOUS at 08:10

## 2022-04-04 RX ADMIN — LIDOCAINE HYDROCHLORIDE 50 MG: 10 INJECTION, SOLUTION EPIDURAL; INFILTRATION; INTRACAUDAL; PERINEURAL at 08:10

## 2022-04-04 RX ADMIN — SODIUM CHLORIDE, SODIUM LACTATE, POTASSIUM CHLORIDE, CALCIUM CHLORIDE 50 ML/HR: 600; 310; 30; 20 INJECTION, SOLUTION INTRAVENOUS at 07:46

## 2022-04-04 NOTE — ANESTHESIA POSTPROCEDURE EVALUATION
Post-Op Assessment Note    CV Status:  Stable  Pain Score: 0    Pain management: adequate     Mental Status:  Alert and awake   Hydration Status:  Euvolemic   PONV Controlled:  Controlled   Airway Patency:  Patent      Post Op Vitals Reviewed: Yes      Staff: CRNA         No complications documented      BP   110/67   Temp     Pulse  56   Resp   15   SpO2   97%

## 2022-04-04 NOTE — ANESTHESIA PREPROCEDURE EVALUATION
Procedure:  COLONOSCOPY    Relevant Problems   CARDIO   (+) Hypertension      HEMATOLOGY   (+) Lymphoma, head, face, or neck      MUSCULOSKELETAL   (+) Primary osteoarthritis of both knees      Other   (+) Lymphoma, head, face, or neck        CAD/PCI/MI/CHF -- denies  COPD/ASTHMA/HERNÁN -- denies  PROBS WITH PRIOR ANESTHESIA -- denies  NPO STATUS CONFIRMED    NHL -- remission x 13 years    Lab Results   Component Value Date    SODIUM 139 01/24/2022    K 4 2 01/24/2022    BUN 20 01/24/2022    CREATININE 1 14 01/24/2022    EGFR 74 06/09/2020    GLUCOSE 148 (H) 09/18/2015     Lab Results   Component Value Date    HGBA1C 5 5 01/06/2021       Lab Results   Component Value Date    HGB 16 8 01/24/2022    HGB 15 9 06/09/2020    HGB 16 1 10/25/2019     01/24/2022     06/09/2020     10/25/2019     Lab Results   Component Value Date    WBC 6 5 01/24/2022       Lab Results   Component Value Date    CREATININE 1 14 01/24/2022    CREATININE 1 05 01/06/2021    CREATININE 1 05 06/09/2020       No results found for: INR  No results found for: PTT    No results found for: LACTATE                          Physical Exam    Airway    Mallampati score: III  TM Distance: >3 FB       Dental   No notable dental hx     Cardiovascular      Pulmonary      Other Findings        Anesthesia Plan  ASA Score- 2     Anesthesia Type- IV sedation with anesthesia with ASA Monitors  Additional Monitors:   Airway Plan:     Comment: SARAN Rosario , have personally seen and evaluated the patient prior to anesthetic care  I have reviewed the pre-anesthetic record, and other medical records if appropriate to the anesthetic care  If a CRNA is involved in the case, I have reviewed the CRNA assessment, if present, and agree  Risks/benefits and alternatives discussed with patient including possible PONV, sore throat, and possibility of rare anesthetic and surgical emergencies            Plan Factors-    Chart reviewed  EKG reviewed  Imaging results reviewed  Existing labs reviewed  Patient summary reviewed  Patient instructed to abstain from smoking on day of procedure  Obstructive sleep apnea risk education given perioperatively  Induction-     Postoperative Plan-     Informed Consent- Anesthetic plan and risks discussed with patient  I personally reviewed this patient with the CRNA  Discussed and agreed on the Anesthesia Plan with the CRNA  Alessandro Uribe

## 2022-04-04 NOTE — H&P
History and Physical -  [facility]  Yasmeen Burris 74 y o  male MRN: [id number]    HPI: Yasmeen Burris is a 74y o  year old male who presents for personal history of colon polyps    REVIEW OF SYSTEMS: Per the HPI, and otherwise unremarkable      Historical Information   Past Medical History:   Diagnosis Date    Cancer ([facility] [address] ) 6/2010    non hodgekins lymphoma    Colon polyp     Hyperlipidemia     Hypertension 2016     Past Surgical History:   Procedure Laterality Date    COLONOSCOPY  01/18/2016    complete - serrted adenoma    CYST REMOVAL      DENTAL SURGERY  2017    Root canals due to cancer    INCISION AND DRAINAGE ABSCESS / HEMATOMA OF BURSA / KNEE / THIGH  02/09/2015    rigjt upper, inner arm abscess    PORTACATH PLACEMENT  2011    And removed      WA KNEE SCOPE,MED/LAT MENISECTOMY Left 11/19/2018    Procedure: ARTHROSCOPY KNEE PARTIAL  LATERAL MENISECTOMY;  Surgeon: Shea Castano MD;  Location: [facility];  Service: Orthopedics    TONSILLECTOMY      As kid    VASECTOMY  1978     Social History   Social History     Substance and Sexual Activity   Alcohol Use Yes    Alcohol/week: 0 0 standard drinks    Comment: 1 / year     Social History     Substance and Sexual Activity   Drug Use No     Social History     Tobacco Use   Smoking Status Never Smoker   Smokeless Tobacco Never Used     Family History   Problem Relation Age of Onset    Atrial fibrillation Mother    [place] COPD Father         Deciced    Rheumatic fever Father     Glaucoma Father         Deciced    Alcohol abuse Father         Deciced    Thyroid disease Sister     Cancer Brother     Hypertension Son     Cancer Brother     Diverticulitis Son        Meds/Allergies       Current Outpatient Medications:     amLODIPine (NORVASC) 5 mg tablet    atorvastatin (LIPITOR) 20 mg tablet    hydrochlorothiazide (HYDRODIURIL) 25 mg tablet    losartan (COZAAR) 50 mg tablet    polyethylene glycol (GOLYTELY) 4000 mL solution    Current Facility-Administered Medications:     lactated ringers infusion, 50 mL/hr, Intravenous, Continuous, Continue from Pre-op at 04/04/22 0808    lactated ringers infusion, 125 mL/hr, Intravenous, Continuous    lidocaine (PF) (XYLOCAINE-MPF) 1 % injection 0 5 mL, 0 5 mL, Infiltration, Once PRN    No Known Allergies    Objective     /76   Pulse 60   Temp (!) 97 4 °F (36 3 °C) (Temporal)   Resp 14   SpO2 95%     PHYSICAL EXAM    Gen: NAD AAOx3  Head: Normocephalic, Atraumatic  CV: S1S2 RRR no m/r/g  CHEST: Clear b/l no c/r/w  ABD: soft, +BS NT/ND  EXT: no edema    ASSESSMENT/PLAN:  This is a 74y o  year old male here for colonoscopy, and he is stable and optimized for his procedure

## 2022-04-04 NOTE — DISCHARGE INSTRUCTIONS
High Fiber Diet   WHAT YOU NEED TO KNOW:   What is a high-fiber diet? A high-fiber diet includes foods that have a high amount of fiber  Fiber is the part of fruits, vegetables, and grains that is not broken down by your body  Fiber keeps your bowel movements regular  Fiber can also help lower your cholesterol level, control blood sugar in people with diabetes, and relieve constipation  Fiber can also help you control your weight because it helps you feel full faster  Most adults should eat 25 to 35 grams of fiber each day  Talk to your dietitian or healthcare provider about the amount of fiber you need  What foods are good sources of fiber? · Foods with at least 4 grams of fiber per serving:      ? ? to ½ cup of high-fiber cereal (check the nutrition label on the box)    ? ½ cup of blackberries or raspberries    ? 4 dried prunes    ? 1 cooked artichoke    ? ½ cup of cooked legumes, such as lentils, or red, kidney, and dey beans    · Foods with 1 to 3 grams of fiber per serving:      ? 1 slice of whole-wheat, pumpernickel, or rye bread    ? ½ cup of cooked brown rice    ? 4 whole-wheat crackers    ? 1 cup of oatmeal    ? ½ cup of cereal with 1 to 3 grams of fiber per serving (check the nutrition label on the box)    ? 1 small piece of fruit, such as an apple, banana, pear, kiwi, or orange    ? 3 dates    ? ½ cup of canned apricots, fruit cocktail, peaches, or pears    ? ½ cup of raw or cooked vegetables, such as carrots, cauliflower, cabbage, spinach, squash, or corn    What are some ways that I can increase fiber in my diet? · Choose brown or wild rice instead of white rice  · Use whole wheat flour in recipes instead of white or all-purpose flour  · Add beans and peas to casseroles or soups  · Choose fresh fruit and vegetables with peels or skins on instead of juices  What other guidelines should I follow? · Add fiber to your diet slowly    You may have abdominal discomfort, bloating, and gas if you add fiber to your diet too quickly  · Drink plenty of liquids as you add fiber to your diet  You may have nausea or develop constipation if you do not drink enough water  Ask how much liquid to drink each day and which liquids are best for you  CARE AGREEMENT:   You have the right to help plan your care  Discuss treatment options with your healthcare provider to decide what care you want to receive  You always have the right to refuse treatment  The above information is an  only  It is not intended as medical advice for individual conditions or treatments  Talk to your doctor, nurse or pharmacist before following any medical regimen to see if it is safe and effective for you  © Copyright [employer] 2022 Information is for End User's use only and may not be sold, redistributed or otherwise used for commercial purposes  All illustrations and images included in CareNotes® are the copyrighted property of [employer]  or Marissa Cabrera   Hemorrhoids   WHAT YOU NEED TO KNOW:   What are hemorrhoids? Hemorrhoids are swollen blood vessels inside your rectum (internal hemorrhoids) or on your anus (external hemorrhoids)  Sometimes a hemorrhoid may prolapse  This means it extends out of your anus  What increases my risk for hemorrhoids? · Pregnancy or obesity    · Straining or sitting for a long time during bowel movements    · Liver disease    · Weak muscles around the anus caused by older age, rectal surgery, or anal intercourse    · A lack of physical activity    · Chronic diarrhea or constipation    · A low-fiber diet    What are the signs and symptoms of hemorrhoids?    · Pain or itching around your anus or inside your rectum    · Swelling or bumps around your anus    · Bright red blood in your bowel movement, on the toilet paper, or in the toilet bowl    · Tissue bulging out of your anus (prolapsed hemorrhoids)    · Incontinence (poor control over urine or bowel movements)    How are hemorrhoids diagnosed? Your healthcare provider will ask about your symptoms, the foods you eat, and your bowel movements  He or she will examine your anus for external hemorrhoids  You may need the following:  · A digital rectal exam  is a test to check for hemorrhoids  Your healthcare provider will put a gloved finger inside your anus to feel for the hemorrhoids  · An anoscopy  is a test that uses a scope (small tube with a light and camera on the end) to look at your hemorrhoids  How are hemorrhoids treated? Treatment will depend on your symptoms  You may need any of the following:  · Medicines  can help decrease pain and swelling, and soften your bowel movement  The medicine may be a pill, pad, cream, or ointment  · Procedures  may be used to shrink or remove your hemorrhoid  Examples include rubber-band ligation, sclerotherapy, and photocoagulation  These procedures may be done in your healthcare provider's office  Ask your healthcare provider for more information about these procedures  · Surgery  may be needed to shrink or remove your hemorrhoids  How can I manage my symptoms? · Apply ice on your anus for 15 to 20 minutes every hour or as directed  Use an ice pack, or put crushed ice in a plastic bag  Cover it with a towel before you apply it to your anus  Ice helps prevent tissue damage and decreases swelling and pain  · Take a sitz bath  Fill a bathtub with 4 to 6 inches of warm water  You may also use a sitz bath pan that fits inside a toilet bowl  Sit in the sitz bath for 15 minutes  Do this 3 times a day, and after each bowel movement  The warm water can help decrease pain and swelling  · Keep your anal area clean  Gently wash the area with warm water daily  Soap may irritate the area  After a bowel movement, wipe with moist towelettes or wet toilet paper  Dry toilet paper can irritate the area  How can I help prevent hemorrhoids?    · Do not strain to have a bowel movement  Do not sit on the toilet too long  These actions can increase pressure on the tissues in your rectum and anus  · Drink plenty of liquids  Liquids can help prevent constipation  Ask how much liquid to drink each day and which liquids are best for you  · Eat a variety of high-fiber foods  Examples include fruits, vegetables, and whole grains  Ask your healthcare provider how much fiber you need each day  You may need to take a fiber supplement  · Exercise as directed  Exercise, such as walking, may make it easier to have a bowel movement  Ask your healthcare provider to help you create an exercise plan  · Do not have anal sex  Anal sex can weaken the skin around your rectum and anus  · Avoid heavy lifting  This can cause straining and increase your risk for another hemorrhoid  When should I seek immediate care? · You have severe pain in your rectum or around your anus  · You have severe pain in your abdomen and you are vomiting  · You have bleeding from your anus that soaks through your underwear  When should I contact my healthcare provider? · You have frequent and painful bowel movements  · Your hemorrhoid looks or feels more swollen than usual      · You do not have a bowel movement for 2 days or more  · You see or feel tissue coming through your anus  · You have questions or concerns about your condition or care  CARE AGREEMENT:   You have the right to help plan your care  Learn about your health condition and how it may be treated  Discuss treatment options with your healthcare providers to decide what care you want to receive  You always have the right to refuse treatment  The above information is an  only  It is not intended as medical advice for individual conditions or treatments  Talk to your doctor, nurse or pharmacist before following any medical regimen to see if it is safe and effective for you    © Copyright [employer] 2022 Information is for End User's use only and may not be sold, redistributed or otherwise used for commercial purposes  All illustrations and images included in CareNotes® are the copyrighted property of [employer]  or Marissa Ruiz  Colorectal Polyps   WHAT YOU NEED TO KNOW:   What are colorectal polyps? Colorectal polyps are small growths of tissue in the lining of the colon and rectum  Most polyps are usually benign (not cancer)  Certain types of polyps, called adenomatous polyps, may turn into cancer  What increases my risk for colorectal polyps? The exact cause of colorectal polyps is unknown  The following may increase your risk:  · Older age    · Foods high in fat and low in fiber    · Family history of polyps    · Intestinal diseases, such as Crohn disease or ulcerative colitis    · Smoking cigarettes or drinking alcohol    · Lack of physical activity, such as exercise    · Obesity    What are the signs and symptoms of colorectal polyps? · Blood in your bowel movement or bleeding from the rectum    · Change in bowel movement habits, such as diarrhea or constipation    · Abdominal pain    What do I need to know about colorectal polyp screening and diagnosis? Screening means you are checked for polyps that may be cancer, even if you do not have signs or symptoms  Screening is recommended starting at age 48 and continuing to age 76 if you are at average risk  Your healthcare provider may suggest screening starting at age 39  Screening may start before you are 45 or continue after you are 75 if your risk is high  Your provider will tell you how often to get screened  Timing depends on the type of screening and if polyps are found  Timing also depends on your age and if you are at increased risk for cancer  Screening may be recommended every 1, 2, 5, or 10 years  Your healthcare provider will need to test polyps to find out if they are cancer   Any of the following may be used to find polyps:  · A digital rectal exam  means your provider will use a finger to check for polyps  · A barium enema  is an x-ray of the colon  A tube is put into your anus, and a liquid called barium is put through the tube  Barium is used so that healthcare providers can see your colon better on the x-ray film  · A virtual colonoscopy  is a CT scan that takes pictures of the inside of your colon and rectum  A small, flexible tube is put into your rectum and air or carbon dioxide (gas) is used to expand your colon  This lets healthcare providers clearly see your colon and any polyps on a monitor  · Colonoscopy or sigmoidoscopy  are procedures to help your provider see the inside of your colon  He or she will use a flexible tube with a small light and camera on the end  During a sigmoidoscopy, your provider will only look at rectum and lower colon  During a colonoscopy, he or she will look at the full length of your colon  A small amount of tissue may be removed from your colon for tests  How are colorectal polyps treated? Small, benign polyps may not need treatment  Your healthcare provider will check the polyp over time to make sure it is not changing  Polyps that are cancer may be removed with one of the following:  · A polypectomy  is a minimally invasive procedure to remove a polyp during a colonoscopy or sigmoidoscopy  Your healthcare provider may need to remove the polyp with a laparoscope  Laparoscopy is done by inserting a small, flexible scope into incisions made on your abdomen  · Surgery  may be needed to remove large or deep polyps that cannot be removed in a polypectomy  Tissues or lymph nodes near a polyp may also be removed  This helps stop cancer from spreading  What can I do to lower my risk for colorectal polyps? · Eat a variety of healthy foods  Healthy foods include fruit, vegetables, whole-grain breads, low-fat dairy products, beans, lean meat, and fish   Ask if you need to be on a special diet  · Maintain a healthy weight  Ask your healthcare provider what a healthy weight is for you  Ask him or her to help with a weight loss plan if you are overweight  · Exercise as directed  Begin to exercise slowly and do more as you get stronger  Talk with your healthcare provider before you start an exercise program          · Limit alcohol  Your risk for polyps increases the more you drink  · Do not smoke  Nicotine and other chemicals in cigarettes and cigars increase your risk for polyps  Ask your healthcare provider for information if you currently smoke and need help to quit  E-cigarettes or smokeless tobacco still contain nicotine  Talk to your healthcare provider before you use these products  Where can I find more information? · [address] (Levine, Susan. \[facility].\"")  [address], [place] [address]  Phone: [phone]  Web Address: [facility] gov    Call your local emergency number (911 in the [address]) if:   · You have sudden shortness of breath  · You have a fast heart rate, fast breathing, or are too dizzy to stand up  When should I seek immediate care? · You have severe abdominal pain  · You see blood in your bowel movement  When should I call my doctor? · You have a fever  · You have chills, a cough, or feel weak and achy  · You have abdominal pain that does not go away or gets worse after you take medicine  · Your abdomen is swollen  · You are losing weight without trying  · You have questions or concerns about your condition or care  CARE AGREEMENT:   You have the right to help plan your care  Learn about your health condition and how it may be treated  Discuss treatment options with your healthcare providers to decide what care you want to receive  You always have the right to refuse treatment  The above information is an  only   It is not intended as medical advice for individual conditions or treatments  Talk to your doctor, nurse or pharmacist before following any medical regimen to see if it is safe and effective for you  © Copyright [employer] 2022 Information is for End User's use only and may not be sold, redistributed or otherwise used for commercial purposes  All illustrations and images included in CareNotes® are the copyrighted property of [employer]  or Marissa Cabrera   Diverticulosis   WHAT YOU NEED TO KNOW:   What is diverticulosis? Diverticulosis is a condition that causes small pockets called diverticula to form in your intestine  These pockets make it difficult for bowel movements to pass through your digestive system  What causes diverticulosis? Diverticula form when muscles have to work hard to move bowel movements through the intestine  The force causes bulges to form at weak areas in the intestine  This may happen if you eat foods that are low in fiber  Fiber helps give your bowel movements more bulk so they are larger and easier to move through your colon  The following may increase your risk of diverticulosis:  · A history of constipation    · Age 36 or older    · Obesity    · Lack of exercise    What are the signs and symptoms of diverticulosis? Diverticulosis usually does not cause any signs or symptoms  It may cause any of the following in some people:  · Pain or discomfort in your lower abdomen    · Abdominal bloating    · Constipation or diarrhea    How is diverticulosis diagnosed? Your healthcare provider will examine you and ask about your bowel movements, diet, and symptoms  He or she will also ask about any medical conditions you have or medicines you take  You may need any of the following:  · Blood tests  may be done to check for signs of inflammation  · A barium enema  is an x-ray of your colon that may show diverticula  A tube is put into your anus, and a liquid called barium is put through the tube  Barium is used so that healthcare providers can see your colon more clearly  · Flexible sigmoidoscopy  is a test to look for any changes in your lower intestines and rectum  It may also show the cause of any bleeding or pain  A soft, bendable tube with a light on the end will be put into your anus  It will then be moved forward into your intestine  · A colonoscopy  is used to look at your whole colon  A scope (long bendable tube with a light on the end) is used to take pictures  This test may show diverticula  · A CT scan , or CAT scan, may show diverticula  You may be given contrast liquid before the scan  Tell the healthcare provider if you have ever had an allergic reaction to contrast liquid  How is diverticulosis managed? The goal of treatment is to manage any symptoms you have and prevent other problems such as diverticulitis  Diverticulitis is swelling or infection of the diverticula  Your healthcare provider may recommend any of the following:  · Eat a variety of high-fiber foods  High-fiber foods help you have regular bowel movements  High-fiber foods include cooked beans, fruits, vegetables, and some cereals  Most adults need 25 to 35 grams of fiber each day  Your healthcare provider may recommend that you have more  Ask your healthcare provider how much fiber you need  Increase fiber slowly  You may have abdominal discomfort, bloating, and gas if you add fiber to your diet too quickly  You may need to take a fiber supplement if you are not getting enough fiber from food  · Medicines  to soften your bowel movements may be given  You may also need medicines to treat symptoms such as bloating and pain  · Drink liquids as directed  You may need to drink 2 to 3 liters (8 to 12 cups) of liquids every day  Ask your healthcare provider how much liquid to drink each day and which liquids are best for you      · Apply heat  on your abdomen for 20 to 30 minutes every 2 hours for as many days as directed  Heat helps decrease pain and muscle spasms  How can I help prevent diverticulitis or other symptoms? The following may help decrease your risk for diverticulitis or symptoms, such as bleeding  Talk to your provider about these or other things you can do to prevent problems that may occur with diverticulosis  · Exercise regularly  Ask your healthcare provider about the best exercise plan for you  Exercise can help you have regular bowel movements  Get 30 minutes of exercise on most days of the week  · Maintain a healthy weight  Ask your healthcare provider what a healthy weight is for you  Ask him or her to help you create a weight loss plan if you are overweight  · Do not smoke  Nicotine and other chemicals in cigarettes increase your risk for diverticulitis  Ask your healthcare provider for information if you currently smoke and need help to quit  E-cigarettes or smokeless tobacco still contain nicotine  Talk to your healthcare provider before you use these products  · Ask your healthcare provider if it is safe to take NSAIDs  NSAIDs may increase your risk of diverticulitis  When should I seek immediate care? · You have severe pain on the left side of your lower abdomen  · Your bowel movements are bright or dark red  When should I call my doctor? · You have a fever and chills  · You feel dizzy or lightheaded  · You have nausea, or you are vomiting  · You have a change in your bowel movements  · You have questions or concerns about your condition or care  CARE AGREEMENT:   You have the right to help plan your care  Learn about your health condition and how it may be treated  Discuss treatment options with your healthcare providers to decide what care you want to receive  You always have the right to refuse treatment  The above information is an  only  It is not intended as medical advice for individual conditions or treatments   Talk to your doctor, nurse or pharmacist before following any medical regimen to see if it is safe and effective for you  © Copyright [address] 2022 Information is for End User's use only and may not be sold, redistributed or otherwise used for commercial purposes  All illustrations and images included in CareNotes® are the copyrighted property of [employer]  or [address]  Colonoscopy   WHAT YOU NEED TO KNOW:   A colonoscopy is a procedure to examine the inside of your colon (intestine) with a scope  Polyps or tissue growths may have been removed during your colonoscopy  It is normal to feel bloated and to have some abdominal discomfort  You should be passing gas  If you have hemorrhoids or you had polyps removed, you may have a small amount of bleeding  DISCHARGE INSTRUCTIONS:   Seek care immediately if:    You have sudden, severe abdominal pain   You have problems swallowing   You have a large amount of black, sticky bowel movements or blood in your bowel movements   You have sudden trouble breathing   You feel weak, lightheaded, or faint or your heart beats faster than normal for you  Contact your healthcare provider if:    You have a fever and chills   You have nausea or are vomiting   Your abdomen is bloated or feels full and hard   You have abdominal pain   You have black, sticky bowel movements or blood in your bowel movements   You have not had a bowel movement for 3 days after your procedure   You have rash or hives   You have questions or concerns about your procedure  Activity:    Do not lift, strain, or run for 24 hours after your procedure   Rest after your procedure  You have been given medicine to relax you  Do not drive or make important decisions until the day after your procedure  Return to your normal activity as directed        Relieve gas and discomfort from bloating by lying on your right side with a heating pad on your abdomen  You may need to take short walks to help the gas move out  Eat small meals until bloating is relieved  Follow up with your healthcare provider as directed: Write down your questions so you remember to ask them during your visits  If you take a blood thinner, please review the specific instructions from your endoscopist about when you should resume it  These can be found in the Recommendation and Your Medication list sections of this After Visit Summary

## 2022-04-04 NOTE — PROGRESS NOTES
Dr Maxim Rodriguez spoke with pt and reviewed  Results  Pt given written and verbal d/c instructions

## 2022-06-18 DIAGNOSIS — E78.5 DYSLIPIDEMIA: ICD-10-CM

## 2022-06-18 RX ORDER — ATORVASTATIN CALCIUM 20 MG/1
TABLET, FILM COATED ORAL
Qty: 90 TABLET | Refills: 1 | Status: SHIPPED | OUTPATIENT
Start: 2022-06-18

## 2022-06-20 ENCOUNTER — OFFICE VISIT (OUTPATIENT)
Dept: FAMILY MEDICINE CLINIC | Facility: HOSPITAL | Age: 67
End: 2022-06-20
Payer: COMMERCIAL

## 2022-06-20 VITALS
SYSTOLIC BLOOD PRESSURE: 128 MMHG | HEART RATE: 72 BPM | TEMPERATURE: 97.1 F | DIASTOLIC BLOOD PRESSURE: 70 MMHG | HEIGHT: 68 IN | BODY MASS INDEX: 35.64 KG/M2 | WEIGHT: 235.2 LBS

## 2022-06-20 DIAGNOSIS — J01.20 ACUTE NON-RECURRENT ETHMOIDAL SINUSITIS: Primary | ICD-10-CM

## 2022-06-20 PROCEDURE — 1160F RVW MEDS BY RX/DR IN RCRD: CPT | Performed by: NURSE PRACTITIONER

## 2022-06-20 PROCEDURE — 3008F BODY MASS INDEX DOCD: CPT | Performed by: NURSE PRACTITIONER

## 2022-06-20 PROCEDURE — 1036F TOBACCO NON-USER: CPT | Performed by: NURSE PRACTITIONER

## 2022-06-20 PROCEDURE — 99214 OFFICE O/P EST MOD 30 MIN: CPT | Performed by: NURSE PRACTITIONER

## 2022-06-20 PROCEDURE — 3074F SYST BP LT 130 MM HG: CPT | Performed by: NURSE PRACTITIONER

## 2022-06-20 PROCEDURE — 3078F DIAST BP <80 MM HG: CPT | Performed by: NURSE PRACTITIONER

## 2022-06-20 RX ORDER — AMOXICILLIN AND CLAVULANATE POTASSIUM 875; 125 MG/1; MG/1
1 TABLET, FILM COATED ORAL EVERY 12 HOURS SCHEDULED
Qty: 14 TABLET | Refills: 0 | Status: SHIPPED | OUTPATIENT
Start: 2022-06-20 | End: 2022-06-27

## 2022-06-20 NOTE — PROGRESS NOTES
Assessment/Plan:     Sinusitis vs toothy infection  Start Augmentin  Has dentist appointment tomorrow  Given lymphoma history should have low threshold for imaging if symptoms fail to resolve  Diagnoses and all orders for this visit:    Acute non-recurrent ethmoidal sinusitis  -     amoxicillin-clavulanate (AUGMENTIN) 875-125 mg per tablet; Take 1 tablet by mouth every 12 (twelve) hours for 7 days          Subjective:     Patient ID: Willam Steiner is a 74 y o  male  Pt presents with symptoms that began three weeks  Reports he had a cold, took a covid test 3-5 days into symptoms and it was negative  Pt's wife believes he has allergies, is not diagnosed and does not take allergy medication  Pt reports initial cold symptoms included runny nose, sore throat, and cough  Reports that his current symptoms are only sinus pressure  Reports he has a history of non hodgkins lymphoma in 2009  Now in remission  Received chemo and radiation  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  Reports that this "kind of feels just like that did " Initially identified the cancer through a root canal  Has not been to a dentist since 2019 due to a lack of dental insurance  Pt clarifies that the pain is in the mid right nare as opposed to a sinus  Reports that if feels like severe congestion, will occasionally blow blood out  Reports that he does still have some gum pain, resolved after his cancer resolution and is now back as of two weeks ago  Denies headache, ear pain, fever, N/V/D, body aches, chest pain, shortness of breath  Pt has been taking tylenol for his symptoms which seems to take the edge off  Review of Systems   Constitutional: Negative for chills, fatigue and fever  HENT: Positive for congestion, sinus pressure and sinus pain  Negative for sore throat  Respiratory: Negative for cough and shortness of breath  Gastrointestinal: Negative for abdominal pain, diarrhea, nausea and vomiting     Musculoskeletal: Negative for myalgias  Neurological: Negative for headaches  The following portions of the patient's history were reviewed and updated as appropriate: allergies, current medications, past family history, past medical history, past social history, past surgical history and problem list     Objective:  Vitals:    06/20/22 1329   BP: 128/70   Pulse: 72   Temp: (!) 97 1 °F (36 2 °C)      Physical Exam  Constitutional:       General: He is not in acute distress  Appearance: Normal appearance  He is not ill-appearing  HENT:      Right Ear: Tympanic membrane, ear canal and external ear normal       Left Ear: Tympanic membrane, ear canal and external ear normal       Nose: Mucosal edema (with erythema) present  Comments: Right sided ethmoidal sinus tenderness     Mouth/Throat:      Mouth: Mucous membranes are moist       Pharynx: Oropharynx is clear  Comments: Mild gum swelling noted over right eye tooth  Cardiovascular:      Rate and Rhythm: Normal rate and regular rhythm  Heart sounds: Normal heart sounds  Pulmonary:      Effort: Pulmonary effort is normal       Breath sounds: Normal breath sounds  Neurological:      Mental Status: He is alert and oriented to person, place, and time  Psychiatric:         Mood and Affect: Mood normal          Behavior: Behavior normal          Thought Content:  Thought content normal          Judgment: Judgment normal

## 2022-07-08 ENCOUNTER — OFFICE VISIT (OUTPATIENT)
Dept: FAMILY MEDICINE CLINIC | Facility: HOSPITAL | Age: 67
End: 2022-07-08
Payer: COMMERCIAL

## 2022-07-08 VITALS
HEIGHT: 68 IN | SYSTOLIC BLOOD PRESSURE: 114 MMHG | BODY MASS INDEX: 36.07 KG/M2 | WEIGHT: 238 LBS | DIASTOLIC BLOOD PRESSURE: 80 MMHG | HEART RATE: 69 BPM | TEMPERATURE: 97.4 F

## 2022-07-08 DIAGNOSIS — E78.5 DYSLIPIDEMIA: ICD-10-CM

## 2022-07-08 DIAGNOSIS — H35.713 CENTRAL SEROUS CHORIORETINOPATHY OF BOTH EYES: ICD-10-CM

## 2022-07-08 DIAGNOSIS — Z12.5 PROSTATE CANCER SCREENING: ICD-10-CM

## 2022-07-08 DIAGNOSIS — I10 PRIMARY HYPERTENSION: Primary | ICD-10-CM

## 2022-07-08 DIAGNOSIS — R73.9 HYPERGLYCEMIA: ICD-10-CM

## 2022-07-08 DIAGNOSIS — C85.91 LYMPHOMA OF LYMPH NODES OF HEAD AND NECK REGION (HCC): ICD-10-CM

## 2022-07-08 DIAGNOSIS — K63.5 HYPERPLASTIC COLONIC POLYP, UNSPECIFIED PART OF COLON: ICD-10-CM

## 2022-07-08 PROCEDURE — 3079F DIAST BP 80-89 MM HG: CPT | Performed by: INTERNAL MEDICINE

## 2022-07-08 PROCEDURE — 1160F RVW MEDS BY RX/DR IN RCRD: CPT | Performed by: INTERNAL MEDICINE

## 2022-07-08 PROCEDURE — 99214 OFFICE O/P EST MOD 30 MIN: CPT | Performed by: INTERNAL MEDICINE

## 2022-07-08 PROCEDURE — 3074F SYST BP LT 130 MM HG: CPT | Performed by: INTERNAL MEDICINE

## 2022-07-08 NOTE — PROGRESS NOTES
Assessment/Plan:    Hypertension  BP at goal, con't current meds for now - advised if needs to stop HCTZ d/t new ophtho med then call and will stop the rx and increase Amlodipine to 10 mg, con't current meds for now, diet/exercise/wgt loss encouraged, recheck in 6 mo or sooner if needed    Central serous chorioretinopathy of both eyes  Following with optho (Dr Casper Osborne) and may need to try Eplerenone but not able at this time as pt is on HCTZ, advised pt we can D/C HCTZ and increase Amlodipine and/or losartan - pt deferring for now but will call if med change is needed    Colon polyp  2 benign polyps removed 4/22 - 5 yr follow up recommended, d/w pt in detail today    Lymphoma, head, face, or neck  No current s/sx of remission, check CBC/LDH annually -BW order given    Hyperglycemia  FBS/A1c annually - order given for Jan 23, diet/exercise/wgt loss encouraged    Dyslipidemia  FLP annually - order given for Jan 23, diet/exercise/wgt loss encouraged, con't current statin for now       Diagnoses and all orders for this visit:    Primary hypertension  -     CBC and differential; Future  -     Comprehensive metabolic panel; Future  -     Hemoglobin A1C; Future  -     Lipid panel; Future  -     TSH, 3rd generation with Free T4 reflex; Future  -     CBC and differential  -     Comprehensive metabolic panel  -     Hemoglobin A1C  -     Lipid panel  -     TSH, 3rd generation with Free T4 reflex    Central serous chorioretinopathy of both eyes    Hyperplastic colonic polyp, unspecified part of colon  -     CBC and differential; Future  -     Comprehensive metabolic panel; Future  -     Hemoglobin A1C; Future  -     Lipid panel; Future  -     TSH, 3rd generation with Free T4 reflex;  Future  -     CBC and differential  -     Comprehensive metabolic panel  -     Hemoglobin A1C  -     Lipid panel  -     TSH, 3rd generation with Free T4 reflex    Hyperglycemia  -     CBC and differential; Future  -     Comprehensive metabolic panel; Future  -     Hemoglobin A1C; Future  -     Lipid panel; Future  -     TSH, 3rd generation with Free T4 reflex; Future  -     CBC and differential  -     Comprehensive metabolic panel  -     Hemoglobin A1C  -     Lipid panel  -     TSH, 3rd generation with Free T4 reflex    Dyslipidemia  -     CBC and differential; Future  -     Comprehensive metabolic panel; Future  -     Hemoglobin A1C; Future  -     Lipid panel; Future  -     TSH, 3rd generation with Free T4 reflex; Future  -     CBC and differential  -     Comprehensive metabolic panel  -     Hemoglobin A1C  -     Lipid panel  -     TSH, 3rd generation with Free T4 reflex    Lymphoma, head, face, or neck  -     LD,Blood; Future  -     LD,Blood    Prostate cancer screening  -     PSA Total (Reflex To Free); Future  -     PSA Total (Reflex To Free)          Colonoscopy 4/22 - 5  yrs    BW 1/22        Subjective:      Patient ID: Patrica Naylor is a 74 y o  male  HPI Pt here for follow up appt    BP at goal today and meds were reviewed and no changes have occurred  He denies missing doses of meds or SE with the meds  He does not check his BP outside the office  He notes no frequent Ha's/dizziness/double vision/CP  He follows with optho for central serous chorioretinopathy of L eye  The option of Eplerenone was reviewed but could not rx as pt is on HCTZ  He wants to discuss further with Opho before we make any BP med changes  Pt had his colonoscopy 4/22 - procedure note reviewed  2 sessile polyps removed from the cecum  Path showed hyperplastic changes and nml colonic mucousa  A 5 yr f/u was recommended d/t personal h/o colon polyps  He admits diet has not been good - his wife likes to bake  He does eat fruit and veggies and watch his junk food  He does walk for exercise  He is not at goal exercise recommendations  Having some dental issues at R upper tooth "number 4"  A little anxious as this is where his lymphoma was     He notes no night sweats/untentional wgt loss/fever  BW 1/22      Review of Systems   Constitutional: Negative for chills, fever and unexpected weight change  HENT: Negative for congestion and trouble swallowing  Eyes: Negative for pain and visual disturbance  Respiratory: Negative for cough and shortness of breath  Cardiovascular: Negative for chest pain and palpitations  Gastrointestinal: Negative for abdominal pain, blood in stool, constipation, diarrhea, nausea and vomiting  Endocrine: Negative for polydipsia and polyuria  Genitourinary: Negative for difficulty urinating and dysuria  Musculoskeletal: Positive for arthralgias  Negative for myalgias  Skin: Negative for rash and wound  Neurological: Negative for dizziness and headaches  Hematological: Does not bruise/bleed easily  Psychiatric/Behavioral: Negative for dysphoric mood  The patient is not nervous/anxious  Objective:    /80   Pulse 69   Temp (!) 97 4 °F (36 3 °C)   Ht 5' 8" (1 727 m)   Wt 108 kg (238 lb)   BMI 36 19 kg/m²      Physical Exam  Vitals and nursing note reviewed  Constitutional:       General: He is not in acute distress  Appearance: He is well-developed  He is obese  He is not ill-appearing  HENT:      Head: Normocephalic and atraumatic  Eyes:      General:         Right eye: No discharge  Left eye: No discharge  Conjunctiva/sclera: Conjunctivae normal    Neck:      Trachea: No tracheal deviation  Cardiovascular:      Rate and Rhythm: Normal rate and regular rhythm  Heart sounds: No murmur heard  Pulmonary:      Effort: Pulmonary effort is normal  No respiratory distress  Breath sounds: Normal breath sounds  No wheezing, rhonchi or rales  Abdominal:      General: There is no distension  Palpations: Abdomen is soft  Tenderness: There is no abdominal tenderness  There is no guarding or rebound     Musculoskeletal:         General: No deformity or signs of injury  Cervical back: Neck supple  Lymphadenopathy:      Cervical: No cervical adenopathy  Skin:     General: Skin is warm and dry  Coloration: Skin is not pale  Findings: No rash  Neurological:      General: No focal deficit present  Mental Status: He is alert  Mental status is at baseline  Motor: No abnormal muscle tone  Gait: Gait normal    Psychiatric:         Mood and Affect: Mood normal          Behavior: Behavior normal          Thought Content:  Thought content normal          Judgment: Judgment normal

## 2022-07-08 NOTE — ASSESSMENT & PLAN NOTE
Following with optho (Dr Kermit Moya) and may need to try Eplerenone but not able at this time as pt is on HCTZ, advised pt we can D/C HCTZ and increase Amlodipine and/or losartan - pt deferring for now but will call if med change is needed

## 2022-07-08 NOTE — ASSESSMENT & PLAN NOTE
BP at goal, con't current meds for now - advised if needs to stop HCTZ d/t new ophtho med then call and will stop the rx and increase Amlodipine to 10 mg, con't current meds for now, diet/exercise/wgt loss encouraged, recheck in 6 mo or sooner if needed

## 2022-07-08 NOTE — ASSESSMENT & PLAN NOTE
FLP annually - order given for Jan 23, diet/exercise/wgt loss encouraged, con't current statin for now

## 2022-08-02 ENCOUNTER — TELEPHONE (OUTPATIENT)
Dept: FAMILY MEDICINE CLINIC | Facility: HOSPITAL | Age: 67
End: 2022-08-02

## 2022-08-02 DIAGNOSIS — I10 ESSENTIAL HYPERTENSION: ICD-10-CM

## 2022-08-02 RX ORDER — AMLODIPINE BESYLATE 10 MG/1
10 TABLET ORAL DAILY
Qty: 30 TABLET | Refills: 1 | Status: SHIPPED | OUTPATIENT
Start: 2022-08-02 | End: 2022-08-26

## 2022-08-02 NOTE — TELEPHONE ENCOUNTER
This was d/w pt in detail at next appt - he can start the Eplerenone as requested by eye doc - can you please notify ophtho office  Also please call pt and aware he needs to stop his HCTZ and increase Amlodipine to 10 mg daily and start the Eplerenone as per Optho  Needs BP check in 3-4 wks d/t BP med changes

## 2022-08-02 NOTE — TELEPHONE ENCOUNTER
Dr Khushi Self from [facility] questioning if he can give Eplerenone to pt   PCB cell [phone]

## 2022-08-02 NOTE — TELEPHONE ENCOUNTER
Dr Yonny Lewis is aware    Pt also aware, appt scheduled for 8/29 and refill for new amlodipine dose sent to pharm

## 2022-08-05 DIAGNOSIS — I10 ESSENTIAL HYPERTENSION: ICD-10-CM

## 2022-08-05 RX ORDER — HYDROCHLOROTHIAZIDE 25 MG/1
TABLET ORAL
Qty: 90 TABLET | Refills: 1 | Status: SHIPPED | OUTPATIENT
Start: 2022-08-05 | End: 2022-08-29

## 2022-08-26 DIAGNOSIS — I10 ESSENTIAL HYPERTENSION: ICD-10-CM

## 2022-08-26 RX ORDER — AMLODIPINE BESYLATE 10 MG/1
TABLET ORAL
Qty: 90 TABLET | Refills: 1 | Status: SHIPPED | OUTPATIENT
Start: 2022-08-26 | End: 2022-08-29 | Stop reason: SDUPTHER

## 2022-08-29 ENCOUNTER — OFFICE VISIT (OUTPATIENT)
Dept: FAMILY MEDICINE CLINIC | Facility: HOSPITAL | Age: 67
End: 2022-08-29
Payer: COMMERCIAL

## 2022-08-29 VITALS
SYSTOLIC BLOOD PRESSURE: 140 MMHG | DIASTOLIC BLOOD PRESSURE: 96 MMHG | WEIGHT: 235.6 LBS | HEIGHT: 68 IN | BODY MASS INDEX: 35.71 KG/M2 | TEMPERATURE: 96.4 F | HEART RATE: 66 BPM

## 2022-08-29 DIAGNOSIS — R60.0 EDEMA OF BOTH LOWER LEGS: ICD-10-CM

## 2022-08-29 DIAGNOSIS — I10 ESSENTIAL HYPERTENSION: ICD-10-CM

## 2022-08-29 DIAGNOSIS — H35.713 CENTRAL SEROUS CHORIORETINOPATHY OF BOTH EYES: Primary | ICD-10-CM

## 2022-08-29 DIAGNOSIS — I10 PRIMARY HYPERTENSION: ICD-10-CM

## 2022-08-29 PROCEDURE — 3080F DIAST BP >= 90 MM HG: CPT | Performed by: INTERNAL MEDICINE

## 2022-08-29 PROCEDURE — 99214 OFFICE O/P EST MOD 30 MIN: CPT | Performed by: INTERNAL MEDICINE

## 2022-08-29 PROCEDURE — 3077F SYST BP >= 140 MM HG: CPT | Performed by: INTERNAL MEDICINE

## 2022-08-29 PROCEDURE — 1160F RVW MEDS BY RX/DR IN RCRD: CPT | Performed by: INTERNAL MEDICINE

## 2022-08-29 RX ORDER — AMLODIPINE BESYLATE 5 MG/1
5 TABLET ORAL DAILY
Qty: 30 TABLET | Refills: 5 | Status: SHIPPED | OUTPATIENT
Start: 2022-08-29

## 2022-08-29 RX ORDER — LOSARTAN POTASSIUM 100 MG/1
100 TABLET ORAL DAILY
Qty: 30 TABLET | Refills: 5 | Status: SHIPPED | OUTPATIENT
Start: 2022-08-29

## 2022-08-29 RX ORDER — EPLERENONE 50 MG/1
50 TABLET, FILM COATED ORAL DAILY
Start: 2022-08-29

## 2022-08-29 RX ORDER — EPLERENONE 50 MG/1
50 TABLET, FILM COATED ORAL DAILY
COMMUNITY
Start: 2022-08-02 | End: 2022-08-29 | Stop reason: SDUPTHER

## 2022-08-29 NOTE — PROGRESS NOTES
Assessment/Plan:    Central serous chorioretinopathy of both eyes  Started on Eplerenone by optho, con't meds and f/u as per optho    Hypertension  Pt UTT higher dose of Amlodipine d/t LE edema/erythema - decrease back to 5 mg and increase losartan to 100 mg q day, check BMP in 1 wk, SE of hyperkalemia/azotemia reviewed, keep hydrated, will follow, recheck BP in 3 wk       Diagnoses and all orders for this visit:    Central serous chorioretinopathy of both eyes  -     eplerenone (INSPRA) 50 MG tablet; Take 1 tablet (50 mg total) by mouth daily    Primary hypertension  -     Basic metabolic panel; Future  -     Basic metabolic panel    Essential hypertension  -     amLODIPine (NORVASC) 5 mg tablet; Take 1 tablet (5 mg total) by mouth daily  -     losartan (COZAAR) 100 MG tablet; Take 1 tablet (100 mg total) by mouth daily    Edema of both lower legs  Comments:  Likely d/t increase in Amlodipine, decrease back to 5 mg as he had no edema on that dose, increase Losartan instead, s/sx of HF reviewed, call if they occur, encouraged low sodium diet and elevate LE at rest and avoid NSAIDs, will follow  Orders:  -     Basic metabolic panel; Future  -     Basic metabolic panel    Other orders  -     Discontinue: eplerenone (INSPRA) 50 MG tablet; Take 50 mg by mouth daily      Colonoscopy 4/22 - 5 yrs    BW 1/22 - has order for 1/23      Subjective:      Patient ID: Jesica Fernandez is a 74 y o  male  HPI Pt here for BP check    Pt has been following with optho for central serous chorioretinopathy  He has no symptoms but has increase in fluid in the L eye on ophtho exam  Optho is requesting pt start Eplerenone 50 mg 1 tab PO q day  Pt was advised by our office that he could start the rx but stop his HCTZ and increase Amlodipine to 10 mg 1 tab PO q day  He is here today for a BP check  BP at goal today and meds were reviewed and med list is UTD    He denies missing doses of meds frequently "I might miss one every 3 wks or so"  He has had some increase in LE edema - rosa maria at the end of the day  He does not check his BP outside the office  He notes no frequent HA's/dizziness/double vision/CP  He notes no orthopnea/SOB/PND  Review of Systems   Constitutional: Negative for chills and fever  Eyes: Negative for pain and visual disturbance  Respiratory: Negative for cough and shortness of breath  Cardiovascular: Positive for leg swelling  Negative for chest pain and palpitations  Gastrointestinal: Negative for abdominal pain, diarrhea, nausea and vomiting  Skin: Positive for color change  Negative for rash and wound  Neurological: Negative for dizziness and headaches  Hematological: Does not bruise/bleed easily  Psychiatric/Behavioral: Negative for dysphoric mood  Objective:    /96   Pulse 66   Temp (!) 96 4 °F (35 8 °C) (Tympanic)   Ht 5' 8" (1 727 m)   Wt 107 kg (235 lb 9 6 oz)   BMI 35 82 kg/m²      Physical Exam  Vitals and nursing note reviewed  Constitutional:       General: He is not in acute distress  Appearance: He is well-developed  He is obese  He is not ill-appearing  HENT:      Head: Normocephalic and atraumatic  Eyes:      General:         Right eye: No discharge  Left eye: No discharge  Conjunctiva/sclera: Conjunctivae normal    Neck:      Trachea: No tracheal deviation  Cardiovascular:      Rate and Rhythm: Normal rate and regular rhythm  Heart sounds: No murmur heard  Comments: +1 B/L LE edema to just below knees  Pulmonary:      Effort: Pulmonary effort is normal  No respiratory distress  Breath sounds: Normal breath sounds  No wheezing, rhonchi or rales  Musculoskeletal:         General: No deformity or signs of injury  Cervical back: Neck supple  Lymphadenopathy:      Cervical: No cervical adenopathy  Skin:     General: Skin is warm and dry  Coloration: Skin is not pale  Findings: No bruising     Neurological: General: No focal deficit present  Mental Status: He is alert  Mental status is at baseline  Motor: No abnormal muscle tone  Gait: Gait normal    Psychiatric:         Mood and Affect: Mood normal          Behavior: Behavior normal          Thought Content:  Thought content normal          Judgment: Judgment normal

## 2022-08-29 NOTE — ASSESSMENT & PLAN NOTE
Pt UTT higher dose of Amlodipine d/t LE edema/erythema - decrease back to 5 mg and increase losartan to 100 mg q day, check BMP in 1 wk, SE of hyperkalemia/azotemia reviewed, keep hydrated, will follow, recheck BP in 3 wk

## 2022-09-15 LAB
BUN SERPL-MCNC: 24 MG/DL (ref 8–27)
BUN/CREAT SERPL: 21 (ref 10–24)
CALCIUM SERPL-MCNC: 9.4 MG/DL (ref 8.6–10.2)
CHLORIDE SERPL-SCNC: 102 MMOL/L (ref 96–106)
CO2 SERPL-SCNC: 23 MMOL/L (ref 20–29)
CREAT SERPL-MCNC: 1.16 MG/DL (ref 0.76–1.27)
EGFR: 69 ML/MIN/1.73
GLUCOSE SERPL-MCNC: 98 MG/DL (ref 65–99)
POTASSIUM SERPL-SCNC: 4.5 MMOL/L (ref 3.5–5.2)
SODIUM SERPL-SCNC: 139 MMOL/L (ref 134–144)

## 2022-09-19 ENCOUNTER — OFFICE VISIT (OUTPATIENT)
Dept: FAMILY MEDICINE CLINIC | Facility: HOSPITAL | Age: 67
End: 2022-09-19
Payer: COMMERCIAL

## 2022-09-19 VITALS
DIASTOLIC BLOOD PRESSURE: 80 MMHG | WEIGHT: 233.2 LBS | HEIGHT: 68 IN | SYSTOLIC BLOOD PRESSURE: 134 MMHG | HEART RATE: 64 BPM | TEMPERATURE: 97.1 F | BODY MASS INDEX: 35.34 KG/M2

## 2022-09-19 DIAGNOSIS — I10 PRIMARY HYPERTENSION: Primary | ICD-10-CM

## 2022-09-19 PROCEDURE — 3079F DIAST BP 80-89 MM HG: CPT | Performed by: INTERNAL MEDICINE

## 2022-09-19 PROCEDURE — 1160F RVW MEDS BY RX/DR IN RCRD: CPT | Performed by: INTERNAL MEDICINE

## 2022-09-19 PROCEDURE — 99213 OFFICE O/P EST LOW 20 MIN: CPT | Performed by: INTERNAL MEDICINE

## 2022-09-19 PROCEDURE — 3075F SYST BP GE 130 - 139MM HG: CPT | Performed by: INTERNAL MEDICINE

## 2022-09-19 NOTE — ASSESSMENT & PLAN NOTE
BP better with increase in losartan - con't current Amlodipine and Losartan, encouraged to cont with healthy diet and regular exercise

## 2022-09-19 NOTE — PROGRESS NOTES
Name: Rosalina Yeung      : 1955      MRN: [id number]  Encounter Provider: Yves Johnson DO  Encounter Date: 2022   Encounter department: [facility]  Primary hypertension  Assessment & Plan:  BP better with increase in losartan - con't current Amlodipine and Losartan, encouraged to cont with healthy diet and regular exercise        Colonoscopy  - 5 yrs    BW  - has order for     Deferring flu vaccine today      Subjective      HPI Pt here for follow up appt    Last visit we increased pts Losartan to 100 mg 1 tab PO q day d/t elevated BP and pt UTT higher dose of Amlodipine  He is here for a BP/med check  BP at goal today and meds were reviewed and med list is UTD  He denies missing doses of meds or SE with the meds  He does not check his BP outside the office  He notes no frequent HA's/dizziness/double vision/CP  Review of Systems   Constitutional: Negative for chills and fever  Eyes: Negative for pain and visual disturbance  Respiratory: Negative for cough and shortness of breath  Cardiovascular: Negative for chest pain, palpitations and leg swelling  Gastrointestinal: Negative for constipation, diarrhea, nausea and vomiting  Skin: Negative for rash and wound  Neurological: Negative for dizziness and headaches         Current Outpatient Medications on File Prior to Visit   Medication Sig    amLODIPine (NORVASC) 5 mg tablet Take 1 tablet (5 mg total) by mouth daily    atorvastatin (LIPITOR) 20 mg tablet TAKE 1 TABLET BY MOUTH EVERY DAY    eplerenone (INSPRA) 50 MG tablet Take 1 tablet (50 mg total) by mouth daily    losartan (COZAAR) 100 MG tablet Take 1 tablet (100 mg total) by mouth daily       Objective     /80   Pulse 64   Temp (!) 97 1 °F (36 2 °C) (Tympanic)   Ht 5' 8" (1 727 m)   Wt 106 kg (233 lb 3 2 oz)   BMI 35 46 kg/m²     Physical Exam  Vitals and nursing note reviewed  Constitutional:       General: He is not in acute distress  Appearance: He is well-developed  He is not ill-appearing  HENT:      Head: Normocephalic and atraumatic  Eyes:      General:         Right eye: No discharge  Left eye: No discharge  Conjunctiva/sclera: Conjunctivae normal    Neck:      Trachea: No tracheal deviation  Cardiovascular:      Rate and Rhythm: Normal rate and regular rhythm  Heart sounds: No murmur heard  Pulmonary:      Effort: Pulmonary effort is normal  No respiratory distress  Breath sounds: Normal breath sounds  No wheezing, rhonchi or rales  Musculoskeletal:         General: No deformity or signs of injury  Cervical back: Neck supple  Lymphadenopathy:      Cervical: No cervical adenopathy  Skin:     General: Skin is warm and dry  Coloration: Skin is not pale  Findings: No rash  Neurological:      General: No focal deficit present  Mental Status: He is alert  Mental status is at baseline  Motor: No abnormal muscle tone  Gait: Gait normal    Psychiatric:         Mood and Affect: Mood normal          Behavior: Behavior normal          Thought Content:  Thought content normal          Judgment: Judgment normal        Jeanette Martinez DO

## 2022-12-17 DIAGNOSIS — E78.5 DYSLIPIDEMIA: ICD-10-CM

## 2022-12-17 RX ORDER — ATORVASTATIN CALCIUM 20 MG/1
TABLET, FILM COATED ORAL
Qty: 90 TABLET | Refills: 1 | Status: SHIPPED | OUTPATIENT
Start: 2022-12-17

## 2022-12-19 ENCOUNTER — OFFICE VISIT (OUTPATIENT)
Dept: FAMILY MEDICINE CLINIC | Facility: HOSPITAL | Age: 67
End: 2022-12-19

## 2022-12-19 VITALS
HEART RATE: 70 BPM | WEIGHT: 234.8 LBS | HEIGHT: 68 IN | TEMPERATURE: 96.6 F | BODY MASS INDEX: 35.58 KG/M2 | SYSTOLIC BLOOD PRESSURE: 132 MMHG | DIASTOLIC BLOOD PRESSURE: 84 MMHG

## 2022-12-19 DIAGNOSIS — H65.191 OTHER NON-RECURRENT ACUTE NONSUPPURATIVE OTITIS MEDIA OF RIGHT EAR: ICD-10-CM

## 2022-12-19 DIAGNOSIS — M70.52 BURSITIS OF LEFT KNEE, UNSPECIFIED BURSA: Primary | ICD-10-CM

## 2022-12-19 RX ORDER — AMOXICILLIN AND CLAVULANATE POTASSIUM 875; 125 MG/1; MG/1
1 TABLET, FILM COATED ORAL EVERY 12 HOURS SCHEDULED
Qty: 14 TABLET | Refills: 0 | Status: SHIPPED | OUTPATIENT
Start: 2022-12-19 | End: 2022-12-26

## 2022-12-19 RX ORDER — FLUTICASONE PROPIONATE 50 MCG
2 SPRAY, SUSPENSION (ML) NASAL DAILY
Qty: 16 G | Refills: 1 | Status: SHIPPED | OUTPATIENT
Start: 2022-12-19

## 2022-12-19 NOTE — PROGRESS NOTES
Name: Gertha Leventhal      : 1955      MRN: [id number]  Encounter Provider: Elza Gonzales DO  Encounter Date: 2022   Encounter department: [facility]      1  Bursitis of left knee, unspecified bursa  Comments:  Very mild L knee bursitis-urged to refrain from kneeling directly on knee and use knee pads if going to, urged RICE and call with persistent/new/worse symptoms    2  Other non-recurrent acute nonsuppurative otitis media of right ear  Comments:  D/t duration of symptoms and R TM erythema & retratction will start Augementin bid x 7 days, con't decongestant and will add Flonase 2 sprays daily, call with persistent/new/worse symptoms  Orders:  -     amoxicillin-clavulanate (AUGMENTIN) 875-125 mg per tablet; Take 1 tablet by mouth every 12 (twelve) hours for 7 days  -     fluticasone (FLONASE) 50 mcg/act nasal spray; 2 sprays into each nostril daily           Subjective      HPI Pt here for an acute visit    Pt noting some L knee pain for about a week  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  He started to note L knee pain and a lump at lateral part of knee through the weekend  He notes pain with changing position and with sleep  He notes no locking up or giving out sensation in the knee  He notes no warmth/redness to the knee and denied F/C  He states "it just throbbed"  He was using some OTC Tylenol through the weekend  Symptoms are almost gone today  He was worried as symptoms felt similar to when he had a meniscus tear in the past       Noting 3 wks of cold symptoms  Having a lot of persistent congestion/PND and ear fullness and pain with decrease in hearing  Has been using Coricidin HBP w/some benefit  Denies F/C/SOB  Review of Systems   Constitutional: Negative for chills and fever  HENT: Positive for congestion, ear pain, postnasal drip and rhinorrhea  Negative for ear discharge and sore throat      Eyes: Negative for discharge, redness and itching  Respiratory: Negative for cough, shortness of breath and wheezing  Cardiovascular: Negative for chest pain and palpitations  Gastrointestinal: Negative for abdominal pain, diarrhea, nausea and vomiting  Musculoskeletal: Positive for arthralgias and joint swelling  Negative for myalgias  Skin: Negative for rash and wound  Neurological: Negative for dizziness and headaches  Hematological: Negative for adenopathy  Current Outpatient Medications on File Prior to Visit   Medication Sig   • amLODIPine (NORVASC) 5 mg tablet Take 1 tablet (5 mg total) by mouth daily   • atorvastatin (LIPITOR) 20 mg tablet TAKE 1 TABLET BY MOUTH EVERY DAY   • eplerenone (INSPRA) 50 MG tablet Take 1 tablet (50 mg total) by mouth daily   • losartan (COZAAR) 100 MG tablet Take 1 tablet (100 mg total) by mouth daily       Objective     /84   Pulse 70   Temp (!) 96 6 °F (35 9 °C) (Tympanic)   Ht 5' 8" (1 727 m)   Wt 107 kg (234 lb 12 8 oz)   BMI 35 70 kg/m²     Physical Exam  Vitals and nursing note reviewed  Constitutional:       General: He is not in acute distress  Appearance: He is well-developed  He is not ill-appearing  HENT:      Head: Normocephalic and atraumatic  Right Ear: There is no impacted cerumen  Left Ear: Tympanic membrane and external ear normal  There is no impacted cerumen  Ears:      Comments: R canal and TM erythematous, R TM also retracted a bit     Mouth/Throat:      Pharynx: Posterior oropharyngeal erythema present  No oropharyngeal exudate  Eyes:      General:         Right eye: No discharge  Left eye: No discharge  Conjunctiva/sclera: Conjunctivae normal    Neck:      Trachea: No tracheal deviation  Cardiovascular:      Rate and Rhythm: Normal rate and regular rhythm  Heart sounds: No murmur heard  Pulmonary:      Effort: Pulmonary effort is normal  No respiratory distress        Breath sounds: Normal breath sounds  No wheezing, rhonchi or rales  Musculoskeletal:         General: Swelling present  No tenderness, deformity or signs of injury  Cervical back: Neck supple  Comments: L knee: very mild prepatellar and lateral patellar bursitis, no crepitus or popping/clicking with PROM, neg ant/post drawer test and neg valgus/varus pain   Lymphadenopathy:      Cervical: Cervical adenopathy present  Skin:     General: Skin is warm and dry  Coloration: Skin is not pale  Findings: No rash  Neurological:      General: No focal deficit present  Mental Status: He is alert  Mental status is at baseline  Motor: No abnormal muscle tone  Gait: Gait normal    Psychiatric:         Mood and Affect: Mood normal          Behavior: Behavior normal          Thought Content:  Thought content normal          Judgment: Judgment normal        Michael Melyl, DO

## 2023-01-06 ENCOUNTER — TELEPHONE (OUTPATIENT)
Dept: FAMILY MEDICINE CLINIC | Facility: HOSPITAL | Age: 68
End: 2023-01-06

## 2023-01-06 NOTE — TELEPHONE ENCOUNTER
Pt recently met with eye specialist who advised to stop taking diuretic Eplerenone in one month  States Dr Ebony Bain had stopped the Hydrochlorithiazide at last visit  Pt asking what to be taking when both meds are stopped   PCB

## 2023-01-10 DIAGNOSIS — I10 ESSENTIAL HYPERTENSION: ICD-10-CM

## 2023-01-10 RX ORDER — LOSARTAN POTASSIUM 100 MG/1
100 TABLET ORAL DAILY
Qty: 30 TABLET | Refills: 0 | Status: SHIPPED | OUTPATIENT
Start: 2023-01-10

## 2023-02-03 DIAGNOSIS — I10 ESSENTIAL HYPERTENSION: ICD-10-CM

## 2023-02-03 RX ORDER — LOSARTAN POTASSIUM 100 MG/1
TABLET ORAL
Qty: 90 TABLET | Refills: 1 | Status: SHIPPED | OUTPATIENT
Start: 2023-02-03

## 2023-02-10 LAB
ALBUMIN SERPL-MCNC: 4.9 G/DL (ref 3.8–4.8)
ALBUMIN/GLOB SERPL: 2.3 {RATIO} (ref 1.2–2.2)
ALP SERPL-CCNC: 50 IU/L (ref 44–121)
ALT SERPL-CCNC: 17 IU/L (ref 0–44)
AST SERPL-CCNC: 19 IU/L (ref 0–40)
BASOPHILS # BLD AUTO: 0 X10E3/UL (ref 0–0.2)
BASOPHILS NFR BLD AUTO: 1 %
BILIRUB SERPL-MCNC: 0.9 MG/DL (ref 0–1.2)
BUN SERPL-MCNC: 24 MG/DL (ref 8–27)
BUN/CREAT SERPL: 20 (ref 10–24)
CALCIUM SERPL-MCNC: 9.2 MG/DL (ref 8.6–10.2)
CHLORIDE SERPL-SCNC: 103 MMOL/L (ref 96–106)
CHOLEST SERPL-MCNC: 169 MG/DL (ref 100–199)
CHOLEST/HDLC SERPL: 3.4 RATIO (ref 0–5)
CO2 SERPL-SCNC: 20 MMOL/L (ref 20–29)
CREAT SERPL-MCNC: 1.21 MG/DL (ref 0.76–1.27)
EGFR: 65 ML/MIN/1.73
EOSINOPHIL # BLD AUTO: 0.1 X10E3/UL (ref 0–0.4)
EOSINOPHIL NFR BLD AUTO: 2 %
ERYTHROCYTE [DISTWIDTH] IN BLOOD BY AUTOMATED COUNT: 13.1 % (ref 11.6–15.4)
EST. AVERAGE GLUCOSE BLD GHB EST-MCNC: 117 MG/DL
GLOBULIN SER-MCNC: 2.1 G/DL (ref 1.5–4.5)
GLUCOSE SERPL-MCNC: 106 MG/DL (ref 70–99)
HBA1C MFR BLD: 5.7 % (ref 4.8–5.6)
HCT VFR BLD AUTO: 50.4 % (ref 37.5–51)
HDLC SERPL-MCNC: 50 MG/DL
HGB BLD-MCNC: 17.4 G/DL (ref 13–17.7)
IMM GRANULOCYTES # BLD: 0 X10E3/UL (ref 0–0.1)
IMM GRANULOCYTES NFR BLD: 0 %
LDH SERPL-CCNC: 215 IU/L (ref 121–224)
LDLC SERPL CALC-MCNC: 105 MG/DL (ref 0–99)
LYMPHOCYTES # BLD AUTO: 1.9 X10E3/UL (ref 0.7–3.1)
LYMPHOCYTES NFR BLD AUTO: 30 %
MCH RBC QN AUTO: 30.4 PG (ref 26.6–33)
MCHC RBC AUTO-ENTMCNC: 34.5 G/DL (ref 31.5–35.7)
MCV RBC AUTO: 88 FL (ref 79–97)
MONOCYTES # BLD AUTO: 0.4 X10E3/UL (ref 0.1–0.9)
MONOCYTES NFR BLD AUTO: 6 %
NEUTROPHILS # BLD AUTO: 3.9 X10E3/UL (ref 1.4–7)
NEUTROPHILS NFR BLD AUTO: 61 %
PLATELET # BLD AUTO: 189 X10E3/UL (ref 150–450)
POTASSIUM SERPL-SCNC: 4.2 MMOL/L (ref 3.5–5.2)
PROT SERPL-MCNC: 7 G/DL (ref 6–8.5)
PSA SERPL-MCNC: 0.5 NG/ML (ref 0–4)
RBC # BLD AUTO: 5.73 X10E6/UL (ref 4.14–5.8)
SL AMB REFLEX CRITERIA: NORMAL
SL AMB VLDL CHOLESTEROL CALC: 14 MG/DL (ref 5–40)
SODIUM SERPL-SCNC: 140 MMOL/L (ref 134–144)
TRIGL SERPL-MCNC: 74 MG/DL (ref 0–149)
TSH SERPL DL<=0.005 MIU/L-ACNC: 3.67 UIU/ML (ref 0.45–4.5)
WBC # BLD AUTO: 6.4 X10E3/UL (ref 3.4–10.8)

## 2023-02-17 ENCOUNTER — OFFICE VISIT (OUTPATIENT)
Dept: FAMILY MEDICINE CLINIC | Facility: HOSPITAL | Age: 68
End: 2023-02-17

## 2023-02-17 VITALS
TEMPERATURE: 97.1 F | HEART RATE: 58 BPM | HEIGHT: 68 IN | WEIGHT: 239.8 LBS | BODY MASS INDEX: 36.34 KG/M2 | SYSTOLIC BLOOD PRESSURE: 126 MMHG | DIASTOLIC BLOOD PRESSURE: 88 MMHG

## 2023-02-17 DIAGNOSIS — I10 PRIMARY HYPERTENSION: ICD-10-CM

## 2023-02-17 DIAGNOSIS — Z00.00 MEDICARE ANNUAL WELLNESS VISIT, SUBSEQUENT: ICD-10-CM

## 2023-02-17 DIAGNOSIS — R73.9 HYPERGLYCEMIA: Primary | ICD-10-CM

## 2023-02-17 DIAGNOSIS — E66.01 SEVERE OBESITY (BMI 35.0-39.9) WITH COMORBIDITY (HCC): ICD-10-CM

## 2023-02-17 DIAGNOSIS — E78.5 DYSLIPIDEMIA: ICD-10-CM

## 2023-02-17 DIAGNOSIS — Z85.79 HISTORY OF LYMPHOMA: ICD-10-CM

## 2023-02-17 DIAGNOSIS — Z11.59 NEED FOR HEPATITIS C SCREENING TEST: ICD-10-CM

## 2023-02-17 NOTE — ASSESSMENT & PLAN NOTE
BP at goal, DBP a bit upper end of goal, urged diet/exercise/wgt loss, con't current BP meds for now, con't checking BP at home and if consistently > 140/90

## 2023-02-17 NOTE — PROGRESS NOTES
Assessment and Plan:     Problem List Items Addressed This Visit        Cardiovascular and Mediastinum    Hypertension     BP at goal, DBP a bit upper end of goal, urged diet/exercise/wgt loss, con't current BP meds for now, con't checking BP at home and if consistently > 140/90            Other    Dyslipidemia     LDL a bit elevated - diet/exercise encouraged, con't current statin, recheck FLP In 1 yr         History of lymphoma    Hyperglycemia - Primary     Both FBS/A1C in IFG range, urged low sugar/carb diet and keep active, recheck in 6 mo -BW order given         Relevant Orders    Hemoglobin A3T    Basic metabolic panel   Other Visit Diagnoses     Medicare annual wellness visit, subsequent        Severe obesity (BMI 35 0-39  9) with comorbidity ([address] )        diet/exercise/wgt loss encouraged    BMI 36 0-36 9,adult        Need for hepatitis C screening test        Relevant Orders    Hepatitis C antibody        BMI Counseling: Body mass index is 36 46 kg/m²  The BMI is above normal  Nutrition recommendations include decreasing portion sizes, encouraging healthy choices of fruits and vegetables, consuming healthier snacks, limiting drinks that contain sugar, moderation in carbohydrate intake, increasing intake of lean protein, reducing intake of saturated and trans fat and reducing intake of cholesterol  Exercise recommendations include exercising 3-5 times per week  No pharmacotherapy was ordered  Rationale for BMI follow-up plan is due to patient being overweight or obese  Depression Screening and Follow-up Plan: Patient was screened for depression during today's encounter  They screened negative with a PHQ-2 score of 0  Preventive health issues were discussed with patient, and age appropriate screening tests were ordered as noted in patient's After Visit Summary      Colonoscopy 4/22 - 5 yrs    BW 2/23    Personalized health advice and appropriate referrals for health education or preventive services given if needed, as noted in patient's After Visit Summary  History of Present Illness:     Patient presents for a Medicare Wellness Visit    HPI Pt here for follow up appt and BW results    BW results were d/w pt in detail: FBS/A1C 106/5 7, Alb a bit elevated at 4 9 but rest of CMP/CBC/LDH/PSA/TSH were wnl, FLP with  but TC/TG and HDL at goal     Def of nml vs IFG vs DM was d/w pt in detail  Diet/exercise was reviewed - wgt up 4 lbs from Aug 22  Admits diet was "horrible" with the holiday months  He is not doing any exercise  BMI reviewed  Goal FLP was d/w pt in detail  Diet/exercise reviewed as noted above  He is taking his Atorvastatin daily as directed w/o Se  He notes no stroke/TIA symptoms/CP  BP at goal today and meds were reviewed and no changes have occurred  He denies missing doses of meds or SE with the meds  He does not check his BP outside the office  He notes no frequent Ha's/dizziness/double vision/CP  Pt was taken off Eplerenone 2 wks ago  Approx a week later her felt some lightheadedness and felt "off"  He monitored his BP and was having some low BP at the time  His BP has seemed to stabilized and has improved since 2 days ago  He feels more balanced  He notes no HA's/double vision/CP/palp  Patient Care Team:  Jatinder Brown DO as PCP - General  Leland Bean MD (Dermatology)  Kiki Rivera MD (Ophthalmology)     Review of Systems:     Review of Systems   Constitutional: Negative for chills, fever and unexpected weight change  HENT: Negative for congestion, hearing loss and trouble swallowing  Eyes: Positive for visual disturbance  Negative for pain  Respiratory: Negative for cough, shortness of breath and wheezing  Cardiovascular: Negative for chest pain, palpitations and leg swelling  Gastrointestinal: Negative for abdominal pain, blood in stool, constipation, diarrhea, nausea and vomiting     Genitourinary: Negative for difficulty urinating and dysuria  Musculoskeletal: Positive for arthralgias  Negative for myalgias  Skin: Negative for rash and wound  Neurological: Negative for dizziness and headaches  Hematological: Does not bruise/bleed easily  Psychiatric/Behavioral: Negative for confusion and dysphoric mood          Problem List:     Patient Active Problem List   Diagnosis   • Apnea   • Dyslipidemia   • Hypertension   • History of lymphoma   • Macular degeneration   • Obesity   • Serrated adenoma of colon   • Umbilical hernia   • Primary osteoarthritis of both knees   • Hyperglycemia   • Chronic pain of both knees   • Colon polyp   • Central serous chorioretinopathy of both eyes      Past Medical and Surgical History:     Past Medical History:   Diagnosis Date   • Colon polyp    • Hypertension 2016     Past Surgical History:   Procedure Laterality Date   • COLONOSCOPY  01/18/2016    complete - serrted adenoma   • CYST REMOVAL     • DENTAL SURGERY  2017    Root canals due to cancer   • INCISION AND DRAINAGE ABSCESS / HEMATOMA OF BURSA / KNEE / THIGH  02/09/2015    rigjt upper, inner arm abscess   • PORTACATH PLACEMENT  2011    And removed     • OH ARTHRS KNE SURG W/MENISCECTOMY MED/LAT W/SHVG Left 11/19/2018    Procedure: ARTHROSCOPY KNEE PARTIAL  LATERAL MENISECTOMY;  Surgeon: Denia Manzo MD;  Location: [facility];  Service: Orthopedics   • TONSILLECTOMY      As kid   • VASECTOMY  1      Family History:     Family History   Problem Relation Age of Onset   • Atrial fibrillation Mother    • COPD Father         Deciced   • Rheumatic fever Father    • Glaucoma Father         Deciced   • Alcohol abuse Father         Deciced   • Thyroid disease Sister    • Cancer Brother    • Hypertension Son    • Cancer Brother    • Diverticulitis Son       Social History:     Social History     Socioeconomic History   • Marital status: /Civil Union     Spouse name: None   • Number of children: None   • Years of education: None   • Highest education level: None   Occupational History     Comment: full time employment    Tobacco Use   • Smoking status: Never   • Smokeless tobacco: Never   Vaping Use   • Vaping Use: Never used   Substance and Sexual Activity   • Alcohol use: Yes     Alcohol/week: 0 0 standard drinks     Comment: 1 / year   • Drug use: No   • Sexual activity: Never     Comment: Resides with Saluda Guise (Wife)   Other Topics Concern   • None   Social History Narrative         Social Determinants of Health     Financial Resource Strain: Low Risk    • Difficulty of Paying Living Expenses: Not hard at all   Food Insecurity: Not on file   Transportation Needs: No Transportation Needs   • Lack of Transportation (Medical): No   • Lack of Transportation (Non-Medical): No   Physical Activity: Not on file   Stress: Not on file   Social Connections: Not on file   Intimate Partner Violence: Not on file   Housing Stability: Not on file      Medications and Allergies:     Current Outpatient Medications   Medication Sig Dispense Refill   • amLODIPine (NORVASC) 5 mg tablet Take 1 tablet (5 mg total) by mouth daily 30 tablet 5   • atorvastatin (LIPITOR) 20 mg tablet TAKE 1 TABLET BY MOUTH EVERY DAY 90 tablet 1   • fluticasone (FLONASE) 50 mcg/act nasal spray SPRAY 2 SPRAYS INTO EACH NOSTRIL EVERY DAY 48 mL 1   • losartan (COZAAR) 100 MG tablet TAKE 1 TABLET BY MOUTH EVERY DAY 90 tablet 1     No current facility-administered medications for this visit       No Known Allergies   Immunizations:     Immunization History   Administered Date(s) Administered   • COVID-19 PFIZER VACCINE 0 3 ML IM 03/09/2021, 03/29/2021, 12/06/2021   • INFLUENZA 10/15/2021   • Influenza, high dose seasonal 0 7 mL 12/16/2019, 10/09/2020   • Influenza, injectable, quadrivalent, preservative free 0 5 mL 10/03/2018   • Pneumococcal Conjugate 13-Valent 12/16/2019   • Pneumococcal Polysaccharide PPV23 01/12/2021   • Tdap 01/22/2016, 05/07/2016      Health Maintenance:         Topic Date Due   • Hepatitis C Screening  Never done   • Colorectal Cancer Screening  04/03/2027         Topic Date Due   • COVID-19 Vaccine (4 - Booster for Pfizer series) 01/31/2022   • Influenza Vaccine (1) 09/01/2022      Medicare Screening Tests and Risk Assessments:     Sarah Ann is here for his Subsequent Wellness visit  Last Medicare Wellness visit information reviewed, patient interviewed and updates made to the record today  Health Risk Assessment:   Patient rates overall health as good  Patient feels that their physical health rating is same  Patient is satisfied with their life  Eyesight was rated as slightly worse  Hearing was rated as same  Patient feels that their emotional and mental health rating is same  Patients states they are never, rarely angry  Patient states they are sometimes unusually tired/fatigued  Patient states that he has experienced no weight loss or gain in last 6 months  Eyesight worse with some progression of cataracts    Depression Screening:   PHQ-2 Score: 0      Fall Risk Screening: In the past year, patient has experienced: no history of falling in past year      Home Safety:  Patient has trouble with stairs inside or outside of their home  Patient has working smoke alarms and has working carbon monoxide detector  Home safety hazards include: none  Some knee pain/unsteadiness going down stairs since L knee meniscus tear    Nutrition:   Current diet is Regular  Medications:   Patient is currently taking over-the-counter supplements  OTC medications include: see medication list  Patient is able to manage medications  Activities of Daily Living (ADLs)/Instrumental Activities of Daily Living (IADLs):   Walk and transfer into and out of bed and chair?: Yes  Dress and groom yourself?: Yes    Bathe or shower yourself?: Yes    Feed yourself?  Yes  Do your laundry/housekeeping?: Yes  Manage your money, pay your bills and track your expenses?: Yes  Make your own meals?: Yes    Do your own shopping?: Yes    Previous Hospitalizations:   Any hospitalizations or ED visits within the last 12 months?: No      Advance Care Planning:   Living will: Yes    Durable POA for healthcare: Yes    Advanced directive: Yes      Cognitive Screening:   Provider or family/friend/caregiver concerned regarding cognition?: No    PREVENTIVE SCREENINGS      Cardiovascular Screening:    General: Screening Current, Risks and Benefits Discussed and History Lipid Disorder      Diabetes Screening:     General: Screening Current and Risks and Benefits Discussed      Colorectal Cancer Screening:     General: Screening Current      Prostate Cancer Screening:    General: Screening Current and Risks and Benefits Discussed      Osteoporosis Screening:    General: Risks and Benefits Discussed and Screening Not Indicated      Abdominal Aortic Aneurysm (AAA) Screening:    Risk factors include: age between 73-69 yo        General: Risks and Benefits Discussed and Screening Not Indicated      Lung Cancer Screening:     General: Screening Not Indicated and Risks and Benefits Discussed      Hepatitis C Screening:    General: Risks and Benefits Discussed and Screening Not Indicated    Screening, Brief Intervention, and Referral to Treatment (SBIRT)    Screening  Typical number of drinks in a day: 0  Typical number of drinks in a week: 0  Interpretation: Low risk drinking behavior  Single Item Drug Screening:  How often have you used an illegal drug (including marijuana) or a prescription medication for non-medical reasons in the past year? never    Single Item Drug Screen Score: 0  Interpretation: Negative screen for possible drug use disorder    Other Counseling Topics:   Car/seat belt/driving safety, sunscreen and regular weightbearing exercise       Vision Screening    Right eye Left eye Both eyes   Without correction      With correction 20/20 20/40 20/15        Physical Exam:     /88   Pulse 58   Temp (!) 97 1 °F (36 2 °C) (Tympanic)   Ht 5' 8" (1 727 m)   Wt 109 kg (239 lb 12 8 oz)   BMI 36 46 kg/m²     Physical Exam  Vitals and nursing note reviewed  Constitutional:       General: He is not in acute distress  Appearance: He is well-developed  He is obese  He is not ill-appearing  HENT:      Head: Normocephalic and atraumatic  Right Ear: Tympanic membrane and external ear normal  There is no impacted cerumen  Left Ear: Tympanic membrane and external ear normal  There is no impacted cerumen  Eyes:      General:         Right eye: No discharge  Left eye: No discharge  Conjunctiva/sclera: Conjunctivae normal    Neck:      Vascular: No carotid bruit  Trachea: No tracheal deviation  Cardiovascular:      Rate and Rhythm: Normal rate and regular rhythm  Heart sounds: Normal heart sounds  No murmur heard  Pulmonary:      Effort: Pulmonary effort is normal  No respiratory distress  Breath sounds: Normal breath sounds  No wheezing, rhonchi or rales  Abdominal:      General: There is no distension  Palpations: Abdomen is soft  Tenderness: There is no abdominal tenderness  There is no guarding or rebound  Musculoskeletal:         General: No deformity or signs of injury  Cervical back: Neck supple  Lymphadenopathy:      Cervical: No cervical adenopathy  Skin:     General: Skin is warm and dry  Coloration: Skin is not pale  Findings: No rash  Neurological:      General: No focal deficit present  Mental Status: He is alert  Mental status is at baseline  Motor: No abnormal muscle tone  Gait: Gait normal    Psychiatric:         Mood and Affect: Mood normal          Behavior: Behavior normal          Thought Content:  Thought content normal          Judgment: Judgment normal           Myrtis Anger, DO

## 2023-02-17 NOTE — PATIENT INSTRUCTIONS
Medicare Preventive Visit Patient Instructions  Thank you for completing your Welcome to Medicare Visit or Medicare Annual Wellness Visit today  Your next wellness visit will be due in one year (2/18/2024)  The screening/preventive services that you may require over the next 5-10 years are detailed below  Some tests may not apply to you based off risk factors and/or age  Screening tests ordered at today's visit but not completed yet may show as past due  Also, please note that scanned in results may not display below  Preventive Screenings:  Service Recommendations Previous Testing/Comments   Colorectal Cancer Screening  · Colonoscopy    · Fecal Occult Blood Test (FOBT)/Fecal Immunochemical Test (FIT)  · Fecal DNA/Cologuard Test  · Flexible Sigmoidoscopy Age: 39-70 years old   Colonoscopy: every 10 years (May be performed more frequently if at higher risk)  OR  FOBT/FIT: every 1 year  OR  Cologuard: every 3 years  OR  Sigmoidoscopy: every 5 years  Screening may be recommended earlier than age 39 if at higher risk for colorectal cancer  Also, an individualized decision between you and your healthcare provider will decide whether screening between the ages of 74-80 would be appropriate   Colonoscopy: 04/04/2022  FOBT/FIT: Not on file  Cologuard: Not on file  Sigmoidoscopy: Not on file    Screening Current     Prostate Cancer Screening Individualized decision between patient and health care provider in men between ages of 53-78   Medicare will cover every 12 months beginning on the day after your 50th birthday PSA: 0 5 ng/mL     Screening Current     Hepatitis C Screening Once for adults born between 1945 and 1965  More frequently in patients at high risk for Hepatitis C Hep C Antibody: Not on file        Diabetes Screening 1-2 times per year if you're at risk for diabetes or have pre-diabetes Fasting glucose: 105 mg/dL (6/9/2020)  A1C: 5 7 % (2/9/2023)  Screening Current   Cholesterol Screening Once every 5 years if you don't have a lipid disorder  May order more often based on risk factors  Lipid panel: 02/09/2023  Screening Current      Other Preventive Screenings Covered by Medicare:  1  Abdominal Aortic Aneurysm (AAA) Screening: covered once if your at risk  You're considered to be at risk if you have a family history of AAA or a male between the age of 73-68 who smoking at least 100 cigarettes in your lifetime  2  Lung Cancer Screening: covers low dose CT scan once per year if you meet all of the following conditions: (1) Age 50-69; (2) No signs or symptoms of lung cancer; (3) Current smoker or have quit smoking within the last 15 years; (4) You have a tobacco smoking history of at least 20 pack years (packs per day x number of years you smoked); (5) You get a written order from a healthcare provider  3  Glaucoma Screening: covered annually if you're considered high risk: (1) You have diabetes OR (2) Family history of glaucoma OR (3)  aged 48 and older OR (3)  American aged 72 and older  3  Osteoporosis Screening: covered every 2 years if you meet one of the following conditions: (1) Have a vertebral abnormality; (2) On glucocorticoid therapy for more than 3 months; (3) Have primary hyperparathyroidism; (4) On osteoporosis medications and need to assess response to drug therapy  5  HIV Screening: covered annually if you're between the age of 12-76  Also covered annually if you are younger than 13 and older than 72 with risk factors for HIV infection  For pregnant patients, it is covered up to 3 times per pregnancy      Immunizations:  Immunization Recommendations   Influenza Vaccine Annual influenza vaccination during flu season is recommended for all persons aged >= 6 months who do not have contraindications   Pneumococcal Vaccine   * Pneumococcal conjugate vaccine = PCV13 (Prevnar 13), PCV15 (Vaxneuvance), PCV20 (Prevnar 20)  * Pneumococcal polysaccharide vaccine = PPSV23 (Pneumovax) Adults 25-60 years old: 1-3 doses may be recommended based on certain risk factors  Adults 72 years old: 1-2 doses may be recommended based off what pneumonia vaccine you previously received   Hepatitis B Vaccine 3 dose series if at intermediate or high risk (ex: diabetes, end stage renal disease, liver disease)   Tetanus (Td) Vaccine - COST NOT COVERED BY MEDICARE PART B Following completion of primary series, a booster dose should be given every 10 years to maintain immunity against tetanus  Td may also be given as tetanus wound prophylaxis  Tdap Vaccine - COST NOT COVERED BY MEDICARE PART B Recommended at least once for all adults  For pregnant patients, recommended with each pregnancy  Shingles Vaccine (Shingrix) - COST NOT COVERED BY MEDICARE PART B  2 shot series recommended in those aged 48 and above     Health Maintenance Due:      Topic Date Due   • Hepatitis C Screening  Never done   • Colorectal Cancer Screening  04/03/2027     Immunizations Due:      Topic Date Due   • COVID-19 Vaccine (4 - Booster for Pfizer series) 01/31/2022   • Influenza Vaccine (1) 09/01/2022     Advance Directives   What are advance directives? Advance directives are legal documents that state your wishes and plans for medical care  These plans are made ahead of time in case you lose your ability to make decisions for yourself  Advance directives can apply to any medical decision, such as the treatments you want, and if you want to donate organs  What are the types of advance directives? There are many types of advance directives, and each state has rules about how to use them  You may choose a combination of any of the following:  · Living will: This is a written record of the treatment you want  You can also choose which treatments you do not want, which to limit, and which to stop at a certain time  This includes surgery, medicine, IV fluid, and tube feedings  · Durable power of  for healthcare [facility]):   This is a written record that states who you want to make healthcare choices for you when you are unable to make them for yourself  This person, called a proxy, is usually a family member or a friend  You may choose more than 1 proxy  · Do not resuscitate (DNR) order:  A DNR order is used in case your heart stops beating or you stop breathing  It is a request not to have certain forms of treatment, such as CPR  A DNR order may be included in other types of advance directives  · Medical directive: This covers the care that you want if you are in a coma, near death, or unable to make decisions for yourself  You can list the treatments you want for each condition  Treatment may include pain medicine, surgery, blood transfusions, dialysis, IV or tube feedings, and a ventilator (breathing machine)  · Values history: This document has questions about your views, beliefs, and how you feel and think about life  This information can help others choose the care that you would choose  Why are advance directives important? An advance directive helps you control your care  Although spoken wishes may be used, it is better to have your wishes written down  Spoken wishes can be misunderstood, or not followed  Treatments may be given even if you do not want them  An advance directive may make it easier for your family to make difficult choices about your care  Weight Management   Why it is important to manage your weight:  Being overweight increases your risk of health conditions such as heart disease, high blood pressure, type 2 diabetes, and certain types of cancer  It can also increase your risk for osteoarthritis, sleep apnea, and other respiratory problems  Aim for a slow, steady weight loss  Even a small amount of weight loss can lower your risk of health problems  How to lose weight safely:  A safe and healthy way to lose weight is to eat fewer calories and get regular exercise   You can lose up about 1 pound a week by decreasing the number of calories you eat by 500 calories each day  Healthy meal plan for weight management:  A healthy meal plan includes a variety of foods, contains fewer calories, and helps you stay healthy  A healthy meal plan includes the following:  · Eat whole-grain foods more often  A healthy meal plan should contain fiber  Fiber is the part of grains, fruits, and vegetables that is not broken down by your body  Whole-grain foods are healthy and provide extra fiber in your diet  Some examples of whole-grain foods are whole-wheat breads and pastas, oatmeal, brown rice, and bulgur  · Eat a variety of vegetables every day  Include dark, leafy greens such as spinach, kale, justina greens, and mustard greens  Eat yellow and orange vegetables such as carrots, sweet potatoes, and winter squash  · Eat a variety of fruits every day  Choose fresh or canned fruit (canned in its own juice or light syrup) instead of juice  Fruit juice has very little or no fiber  · Eat low-fat dairy foods  Drink fat-free (skim) milk or 1% milk  Eat fat-free yogurt and low-fat cottage cheese  Try low-fat cheeses such as mozzarella and other reduced-fat cheeses  · Choose meat and other protein foods that are low in fat  Choose beans or other legumes such as split peas or lentils  Choose fish, skinless poultry (chicken or turkey), or lean cuts of red meat (beef or pork)  Before you cook meat or poultry, cut off any visible fat  · Use less fat and oil  Try baking foods instead of frying them  Add less fat, such as margarine, sour cream, regular salad dressing and mayonnaise to foods  Eat fewer high-fat foods  Some examples of high-fat foods include french fries, doughnuts, ice cream, and cakes  · Eat fewer sweets  Limit foods and drinks that are high in sugar  This includes candy, cookies, regular soda, and sweetened drinks  Exercise:  Exercise at least 30 minutes per day on most days of the week   Some examples of exercise include walking, biking, dancing, and swimming  You can also fit in more physical activity by taking the stairs instead of the elevator or parking farther away from stores  Ask your healthcare provider about the best exercise plan for you  © Copyright [employer] 2018 Information is for End User's use only and may not be sold, redistributed or otherwise used for commercial purposes  All illustrations and images included in CareNotes® are the copyrighted property of [employer]  or [facility] Preventive Visit Patient Instructions  Thank you for completing your Welcome to Medicare Visit or Medicare Annual Wellness Visit today  Your next wellness visit will be due in one year (2/18/2024)  The screening/preventive services that you may require over the next 5-10 years are detailed below  Some tests may not apply to you based off risk factors and/or age  Screening tests ordered at today's visit but not completed yet may show as past due  Also, please note that scanned in results may not display below  Preventive Screenings:  Service Recommendations Previous Testing/Comments   Colorectal Cancer Screening  · Colonoscopy    · Fecal Occult Blood Test (FOBT)/Fecal Immunochemical Test (FIT)  · Fecal DNA/Cologuard Test  · Flexible Sigmoidoscopy Age: 39-70 years old   Colonoscopy: every 10 years (May be performed more frequently if at higher risk)  OR  FOBT/FIT: every 1 year  OR  Cologuard: every 3 years  OR  Sigmoidoscopy: every 5 years  Screening may be recommended earlier than age 39 if at higher risk for colorectal cancer  Also, an individualized decision between you and your healthcare provider will decide whether screening between the ages of 74-80 would be appropriate   Colonoscopy: 04/04/2022  FOBT/FIT: Not on file  Cologuard: Not on file  Sigmoidoscopy: Not on file    Screening Current     Prostate Cancer Screening Individualized decision between patient and health care provider in men between ages of 53-78   Medicare will cover every 12 months beginning on the day after your 50th birthday PSA: 0 5 ng/mL     Screening Current     Hepatitis C Screening Once for adults born between 1945 and 1965  More frequently in patients at high risk for Hepatitis C Hep C Antibody: Not on file        Diabetes Screening 1-2 times per year if you're at risk for diabetes or have pre-diabetes Fasting glucose: 105 mg/dL (6/9/2020)  A1C: 5 7 % (2/9/2023)  Screening Current   Cholesterol Screening Once every 5 years if you don't have a lipid disorder  May order more often based on risk factors  Lipid panel: 02/09/2023  Screening Current      Other Preventive Screenings Covered by Medicare:  6  Abdominal Aortic Aneurysm (AAA) Screening: covered once if your at risk  You're considered to be at risk if you have a family history of AAA or a male between the age of 73-68 who smoking at least 100 cigarettes in your lifetime  7  Lung Cancer Screening: covers low dose CT scan once per year if you meet all of the following conditions: (1) Age 50-69; (2) No signs or symptoms of lung cancer; (3) Current smoker or have quit smoking within the last 15 years; (4) You have a tobacco smoking history of at least 20 pack years (packs per day x number of years you smoked); (5) You get a written order from a healthcare provider  8  Glaucoma Screening: covered annually if you're considered high risk: (1) You have diabetes OR (2) Family history of glaucoma OR (3)  aged 48 and older OR (3)  American aged 72 and older  5  Osteoporosis Screening: covered every 2 years if you meet one of the following conditions: (1) Have a vertebral abnormality; (2) On glucocorticoid therapy for more than 3 months; (3) Have primary hyperparathyroidism; (4) On osteoporosis medications and need to assess response to drug therapy  10  HIV Screening: covered annually if you're between the age of 12-76   Also covered annually if you are younger than 13 and older than 72 with risk factors for HIV infection  For pregnant patients, it is covered up to 3 times per pregnancy  Immunizations:  Immunization Recommendations   Influenza Vaccine Annual influenza vaccination during flu season is recommended for all persons aged >= 6 months who do not have contraindications   Pneumococcal Vaccine   * Pneumococcal conjugate vaccine = PCV13 (Prevnar 13), PCV15 (Vaxneuvance), PCV20 (Prevnar 20)  * Pneumococcal polysaccharide vaccine = PPSV23 (Pneumovax) Adults 25-60 years old: 1-3 doses may be recommended based on certain risk factors  Adults 72 years old: 1-2 doses may be recommended based off what pneumonia vaccine you previously received   Hepatitis B Vaccine 3 dose series if at intermediate or high risk (ex: diabetes, end stage renal disease, liver disease)   Tetanus (Td) Vaccine - COST NOT COVERED BY MEDICARE PART B Following completion of primary series, a booster dose should be given every 10 years to maintain immunity against tetanus  Td may also be given as tetanus wound prophylaxis  Tdap Vaccine - COST NOT COVERED BY MEDICARE PART B Recommended at least once for all adults  For pregnant patients, recommended with each pregnancy  Shingles Vaccine (Shingrix) - COST NOT COVERED BY MEDICARE PART B  2 shot series recommended in those aged 48 and above     Health Maintenance Due:      Topic Date Due   • Hepatitis C Screening  Never done   • Colorectal Cancer Screening  04/03/2027     Immunizations Due:      Topic Date Due   • COVID-19 Vaccine (4 - Booster for Pfizer series) 01/31/2022   • Influenza Vaccine (1) 09/01/2022     Advance Directives   What are advance directives? Advance directives are legal documents that state your wishes and plans for medical care  These plans are made ahead of time in case you lose your ability to make decisions for yourself   Advance directives can apply to any medical decision, such as the treatments you want, and if you want to donate organs  What are the types of advance directives? There are many types of advance directives, and each state has rules about how to use them  You may choose a combination of any of the following:  · Living will: This is a written record of the treatment you want  You can also choose which treatments you do not want, which to limit, and which to stop at a certain time  This includes surgery, medicine, IV fluid, and tube feedings  · Durable power of  for healthcare [facility]): This is a written record that states who you want to make healthcare choices for you when you are unable to make them for yourself  This person, called a proxy, is usually a family member or a friend  You may choose more than 1 proxy  · Do not resuscitate (DNR) order:  A DNR order is used in case your heart stops beating or you stop breathing  It is a request not to have certain forms of treatment, such as CPR  A DNR order may be included in other types of advance directives  · Medical directive: This covers the care that you want if you are in a coma, near death, or unable to make decisions for yourself  You can list the treatments you want for each condition  Treatment may include pain medicine, surgery, blood transfusions, dialysis, IV or tube feedings, and a ventilator (breathing machine)  · Values history: This document has questions about your views, beliefs, and how you feel and think about life  This information can help others choose the care that you would choose  Why are advance directives important? An advance directive helps you control your care  Although spoken wishes may be used, it is better to have your wishes written down  Spoken wishes can be misunderstood, or not followed  Treatments may be given even if you do not want them  An advance directive may make it easier for your family to make difficult choices about your care     Weight Management   Why it is important to manage your weight: Being overweight increases your risk of health conditions such as heart disease, high blood pressure, type 2 diabetes, and certain types of cancer  It can also increase your risk for osteoarthritis, sleep apnea, and other respiratory problems  Aim for a slow, steady weight loss  Even a small amount of weight loss can lower your risk of health problems  How to lose weight safely:  A safe and healthy way to lose weight is to eat fewer calories and get regular exercise  You can lose up about 1 pound a week by decreasing the number of calories you eat by 500 calories each day  Healthy meal plan for weight management:  A healthy meal plan includes a variety of foods, contains fewer calories, and helps you stay healthy  A healthy meal plan includes the following:  · Eat whole-grain foods more often  A healthy meal plan should contain fiber  Fiber is the part of grains, fruits, and vegetables that is not broken down by your body  Whole-grain foods are healthy and provide extra fiber in your diet  Some examples of whole-grain foods are whole-wheat breads and pastas, oatmeal, brown rice, and bulgur  · Eat a variety of vegetables every day  Include dark, leafy greens such as spinach, kale, justina greens, and mustard greens  Eat yellow and orange vegetables such as carrots, sweet potatoes, and winter squash  · Eat a variety of fruits every day  Choose fresh or canned fruit (canned in its own juice or light syrup) instead of juice  Fruit juice has very little or no fiber  · Eat low-fat dairy foods  Drink fat-free (skim) milk or 1% milk  Eat fat-free yogurt and low-fat cottage cheese  Try low-fat cheeses such as mozzarella and other reduced-fat cheeses  · Choose meat and other protein foods that are low in fat  Choose beans or other legumes such as split peas or lentils  Choose fish, skinless poultry (chicken or turkey), or lean cuts of red meat (beef or pork)   Before you cook meat or poultry, cut off any visible fat    · Use less fat and oil  Try baking foods instead of frying them  Add less fat, such as margarine, sour cream, regular salad dressing and mayonnaise to foods  Eat fewer high-fat foods  Some examples of high-fat foods include french fries, doughnuts, ice cream, and cakes  · Eat fewer sweets  Limit foods and drinks that are high in sugar  This includes candy, cookies, regular soda, and sweetened drinks  Exercise:  Exercise at least 30 minutes per day on most days of the week  Some examples of exercise include walking, biking, dancing, and swimming  You can also fit in more physical activity by taking the stairs instead of the elevator or parking farther away from stores  Ask your healthcare provider about the best exercise plan for you  © Copyright [employer] 2018 Information is for End User's use only and may not be sold, redistributed or otherwise used for commercial purposes   All illustrations and images included in CareNotes® are the copyrighted property of [employer]  or [address] [place] [address]

## 2023-02-17 NOTE — ASSESSMENT & PLAN NOTE
Both FBS/A1C in IFG range, urged low sugar/carb diet and keep active, recheck in 6 mo -BW order given

## 2023-04-25 DIAGNOSIS — I10 ESSENTIAL HYPERTENSION: ICD-10-CM

## 2023-04-25 RX ORDER — AMLODIPINE BESYLATE 5 MG/1
5 TABLET ORAL DAILY
Qty: 30 TABLET | Refills: 0 | Status: SHIPPED | OUTPATIENT
Start: 2023-04-25

## 2023-05-19 DIAGNOSIS — I10 ESSENTIAL HYPERTENSION: ICD-10-CM

## 2023-05-21 RX ORDER — AMLODIPINE BESYLATE 5 MG/1
5 TABLET ORAL DAILY
Qty: 90 TABLET | Refills: 1 | Status: SHIPPED | OUTPATIENT
Start: 2023-05-21

## 2023-07-13 ENCOUNTER — OFFICE VISIT (OUTPATIENT)
Dept: FAMILY MEDICINE CLINIC | Facility: HOSPITAL | Age: 68
End: 2023-07-13
Payer: COMMERCIAL

## 2023-07-13 VITALS
HEIGHT: 68 IN | WEIGHT: 235.4 LBS | BODY MASS INDEX: 35.68 KG/M2 | SYSTOLIC BLOOD PRESSURE: 136 MMHG | HEART RATE: 64 BPM | DIASTOLIC BLOOD PRESSURE: 84 MMHG | TEMPERATURE: 96.9 F

## 2023-07-13 DIAGNOSIS — I10 PRIMARY HYPERTENSION: ICD-10-CM

## 2023-07-13 DIAGNOSIS — Z85.72 HISTORY OF LYMPHOMA: ICD-10-CM

## 2023-07-13 DIAGNOSIS — M54.2 CERVICALGIA: Primary | ICD-10-CM

## 2023-07-13 PROCEDURE — 99214 OFFICE O/P EST MOD 30 MIN: CPT | Performed by: INTERNAL MEDICINE

## 2023-07-13 RX ORDER — CYCLOBENZAPRINE HCL 5 MG
5 TABLET ORAL 3 TIMES DAILY PRN
Qty: 10 TABLET | Refills: 0 | Status: SHIPPED | OUTPATIENT
Start: 2023-07-13

## 2023-07-13 NOTE — ASSESSMENT & PLAN NOTE
No other symptoms and cervical adenopathy may be reactive, will monitor and if still present at next appt will check imaging, pt agreeable to plan

## 2023-07-13 NOTE — PROGRESS NOTES
Name: Keisha Amin      : 1955      MRN: [id number]  Encounter Provider: Bhargav Mcdowell DO  Encounter Date: 2023   Encounter department: [address]     1. Cervicalgia  Comments:  Reassured sounded muscular and may need to switch pillow out, PT reviewed - pt deferring for now, encouraged heat/massage/stretches (shown), will trial Flexeril (SE/sedation reviewed), urged Tylenol and rare NSAID as needed, call with any red flag radicular symptoms, will follow and re-eval at regular f/u appt in Aug  Orders:  -     cyclobenzaprine (FLEXERIL) 5 mg tablet; Take 1 tablet (5 mg total) by mouth 3 (three) times a day as needed for muscle spasms    2. Primary hypertension  Assessment & Plan:  BP better on recheck at end of appt, con't current meds for now, check BP at home and bring readings to next appt in Aug      3. History of lymphoma  Assessment & Plan:  No other symptoms and cervical adenopathy may be reactive, will monitor and if still present at next appt (Aug)  will check imaging, pt agreeable to plan        Colonoscopy  - 5 yrs    BW       Subjective      HPI Pt here for an acute visit    Pt with 4 wks of L neck pain. He is not able to id a specific fall/injury/traum/new activity. The neck pain is intermittent - pain worse in the am and improves during the day. The pain woke him from sleep in the middle of the night last night. He notes a lump in the L neck region and was concerned d/t his h/o lymphoma. In general the pain is "pretty severe". The pain does not radiate. He notes no UE numbness/tingling/weakness. He has tried Tylenol and Ibuprofen w/some benefit. He notes no previous h/o neck problems. He denies F/C/cough/ear pain/ST. BP up today on presentation. He has not been checking his BP at home. He notes no HA's/dizziness/double vision/CP/SOB.        Review of Systems   Constitutional: Negative for chills and fever.   HENT: Negative for congestion, ear pain and sore throat. Eyes: Negative for pain and visual disturbance. Respiratory: Negative for cough and shortness of breath. Cardiovascular: Negative for chest pain and palpitations. Genitourinary: Negative for difficulty urinating and dysuria. Musculoskeletal: Positive for neck pain. Skin: Negative for rash and wound. Neurological: Negative for dizziness, weakness, numbness and headaches. Hematological: Positive for adenopathy. Does not bruise/bleed easily. Psychiatric/Behavioral: Negative for confusion and dysphoric mood. Current Outpatient Medications on File Prior to Visit   Medication Sig   • amLODIPine (NORVASC) 5 mg tablet TAKE 1 TABLET (5 MG TOTAL) BY MOUTH DAILY. • atorvastatin (LIPITOR) 20 mg tablet TAKE 1 TABLET BY MOUTH EVERY DAY   • losartan (COZAAR) 100 MG tablet TAKE 1 TABLET BY MOUTH EVERY DAY   • [DISCONTINUED] fluticasone (FLONASE) 50 mcg/act nasal spray SPRAY 2 SPRAYS INTO EACH NOSTRIL EVERY DAY       Objective     /84   Pulse 64   Temp (!) 96.9 °F (36.1 °C) (Tympanic)   Ht 5' 8" (1.727 m)   Wt 107 kg (235 lb 6.4 oz)   BMI 35.79 kg/m²     Physical Exam  Vitals and nursing note reviewed. Constitutional:       General: He is not in acute distress. Appearance: He is well-developed. He is not ill-appearing. HENT:      Head: Normocephalic and atraumatic. Eyes:      General:         Right eye: No discharge. Left eye: No discharge. Conjunctiva/sclera: Conjunctivae normal.   Neck:      Trachea: No tracheal deviation. Comments: L ant cervical nontender adenopathy  Cardiovascular:      Rate and Rhythm: Normal rate and regular rhythm. Heart sounds: No murmur heard. Pulmonary:      Effort: Pulmonary effort is normal. No respiratory distress. Breath sounds: Normal breath sounds. No wheezing, rhonchi or rales. Musculoskeletal:         General: No tenderness, deformity or signs of injury. Cervical back: Neck supple. Comments: Cervical spine: no pain with palp of spinous processes, + tenderness to palp L SCM/trapezius, nml flex but has some pain and decrease in AROM in ext and rotation B/L, 5/5 B/L UE muscle strength   Lymphadenopathy:      Cervical: Cervical adenopathy present. Skin:     General: Skin is warm and dry. Coloration: Skin is not pale. Findings: No rash. Neurological:      General: No focal deficit present. Mental Status: He is alert. Mental status is at baseline. Sensory: No sensory deficit. Motor: No weakness or abnormal muscle tone. Gait: Gait normal.      Deep Tendon Reflexes: Reflexes normal.      Comments: 5/5 B/L UE muscle strength, sensation intact to light touch B/L UE, 2/4 bicep reflexes B/L UE   Psychiatric:         Mood and Affect: Mood normal.         Behavior: Behavior normal.         Thought Content:  Thought content normal.         Judgment: Judgment normal.          Flakito Georges,

## 2023-07-13 NOTE — ASSESSMENT & PLAN NOTE
BP better on recheck at end of appt, con't current meds for now, check BP at home and bring readings to next appt

## 2023-07-20 DIAGNOSIS — E78.5 DYSLIPIDEMIA: ICD-10-CM

## 2023-07-20 RX ORDER — ATORVASTATIN CALCIUM 20 MG/1
TABLET, FILM COATED ORAL
Qty: 90 TABLET | Refills: 1 | Status: SHIPPED | OUTPATIENT
Start: 2023-07-20

## 2023-08-06 DIAGNOSIS — I10 ESSENTIAL HYPERTENSION: ICD-10-CM

## 2023-08-06 RX ORDER — LOSARTAN POTASSIUM 100 MG/1
TABLET ORAL
Qty: 90 TABLET | Refills: 1 | Status: SHIPPED | OUTPATIENT
Start: 2023-08-06

## 2023-08-11 LAB
BUN SERPL-MCNC: 24 MG/DL (ref 8–27)
BUN/CREAT SERPL: 22 (ref 10–24)
CALCIUM SERPL-MCNC: 9.2 MG/DL (ref 8.6–10.2)
CHLORIDE SERPL-SCNC: 106 MMOL/L (ref 96–106)
CO2 SERPL-SCNC: 21 MMOL/L (ref 20–29)
CREAT SERPL-MCNC: 1.09 MG/DL (ref 0.76–1.27)
EGFR: 73 ML/MIN/1.73
EST. AVERAGE GLUCOSE BLD GHB EST-MCNC: 111 MG/DL
GLUCOSE SERPL-MCNC: 97 MG/DL (ref 70–99)
HBA1C MFR BLD: 5.5 % (ref 4.8–5.6)
HCV AB S/CO SERPL IA: NON REACTIVE
POTASSIUM SERPL-SCNC: 4.3 MMOL/L (ref 3.5–5.2)
SODIUM SERPL-SCNC: 140 MMOL/L (ref 134–144)

## 2023-08-25 ENCOUNTER — OFFICE VISIT (OUTPATIENT)
Dept: FAMILY MEDICINE CLINIC | Facility: HOSPITAL | Age: 68
End: 2023-08-25
Payer: COMMERCIAL

## 2023-08-25 VITALS
TEMPERATURE: 96.4 F | HEART RATE: 66 BPM | WEIGHT: 237.6 LBS | HEIGHT: 68 IN | SYSTOLIC BLOOD PRESSURE: 146 MMHG | DIASTOLIC BLOOD PRESSURE: 82 MMHG | BODY MASS INDEX: 36.01 KG/M2

## 2023-08-25 DIAGNOSIS — M54.2 CERVICALGIA: ICD-10-CM

## 2023-08-25 DIAGNOSIS — R59.0 ANTERIOR CERVICAL ADENOPATHY: ICD-10-CM

## 2023-08-25 DIAGNOSIS — I10 PRIMARY HYPERTENSION: ICD-10-CM

## 2023-08-25 DIAGNOSIS — R73.9 HYPERGLYCEMIA: Primary | ICD-10-CM

## 2023-08-25 DIAGNOSIS — Z85.72 HISTORY OF LYMPHOMA: ICD-10-CM

## 2023-08-25 PROCEDURE — 99214 OFFICE O/P EST MOD 30 MIN: CPT | Performed by: INTERNAL MEDICINE

## 2023-08-25 NOTE — PROGRESS NOTES
Name: Ana Aguilar      : 1955      MRN: [id number]  Encounter Provider: Jocelynn Poole DO  Encounter Date: 2023   Encounter department: [address]     1. Hyperglycemia  Assessment & Plan:  FBS and A1C both improved from last visit, con't to encourage healthy diet and regular exercise, recheck in 6 mos - WILL ORDER AT NEXT APPT      2. Primary hypertension  Assessment & Plan:  BP a bit elevated and only minimally improved by end of appt, pt admittedly a bit anxious over his adenopathy and fear of his lymphoma coming back, will con't current meds for now and recheck in 3 mos - if still > 140/90 will increase Amlodipine      3. Cervicalgia  Comments:  Persistent yet improved neck pain, PT discussed - pt agreeable to try after CT done and he knows its not his lymphoma, had no great benefit with Flexeril so will D/C, con't Tylenol/heat/massage and stretches, call with new/worse symptoms or neuro symptoms (reviewed)  Orders:  -     Ambulatory Referral to Physical Therapy; Future    4. Anterior cervical adenopathy  Comments:  Adenopathy persisting 6 wks and pt w/history of lymphoma, no systemic symptoms persent but will check CT neck soft tissue and follow  Orders:  -     CT soft tissue neck w contrast; Future; Expected date: 2023    5. History of lymphoma  Assessment & Plan: Will check CT of neck and follow  Orders:  -     CT soft tissue neck w contrast; Future; Expected date: 2023      Colonoscopy  - 5 yrs    BW   FLP & PSA       Subjective      HPI Pt here for follow up appt and BW results    BW results were d/w pt in detail: FBS/A1C 97/5.5, rest of BMP and Hep C Ab were wnl. Def of nml vs IFG vs DM was d/w pt in detail. Diet/exercise was reviewed - wgt down 2 lbs from . He admits his wife makes a lot of baked goods and eats a lot of potatoes. He does no formal exercise. He does mow the lawn once a week. BP again above goal today and meds were reviewed and no changes have occurred. He denies missing doses of meds or SE with the meds. He does not check his BP outside the office. He notes no frequent HA's/dizziness/double vision/CP. He notes constant feeling of "I have a golf ball in here" and points to L side of his neck. He states the sensation started about a month ago when he was seen for neck pain in July. The sensation is constant. He has no pain and states "Its just there". He denies difficulty swallowing or pain with swallowing. He denies hoarseness or change in voice. He denies congestion/runny nose/PND/ST. He still has some L sided neck pain but it has improved. He states the neck pain is L sided and worse when he pushes on the area and at rest.  The pain does not radiate into the UE. He notes no UE numbness/tingling/weakness. Currently he states the neck pain is a 3/10. He uses Tylenol as needed. He is no longer uses [facility] as he does not feel it helped much. Review of Systems   Constitutional: Negative for chills, fever and unexpected weight change. HENT: Negative for sore throat, trouble swallowing and voice change. Respiratory: Negative for cough and shortness of breath. Cardiovascular: Negative for chest pain and palpitations. Gastrointestinal: Negative for abdominal pain, diarrhea, nausea and vomiting. Musculoskeletal: Positive for neck pain. Skin: Negative for rash and wound. Neurological: Negative for dizziness, weakness, numbness and headaches. Hematological: Positive for adenopathy. Psychiatric/Behavioral: Negative for behavioral problems and confusion. Current Outpatient Medications on File Prior to Visit   Medication Sig   • amLODIPine (NORVASC) 5 mg tablet TAKE 1 TABLET (5 MG TOTAL) BY MOUTH DAILY.    • atorvastatin (LIPITOR) 20 mg tablet TAKE 1 TABLET BY MOUTH EVERY DAY   • losartan (COZAAR) 100 MG tablet TAKE 1 TABLET BY MOUTH EVERY DAY   • [DISCONTINUED] cyclobenzaprine (FLEXERIL) 5 mg tablet Take 1 tablet (5 mg total) by mouth 3 (three) times a day as needed for muscle spasms       Objective     /82   Pulse 66   Temp (!) 96.4 °F (35.8 °C) (Temporal)   Ht 5' 8" (1.727 m)   Wt 108 kg (237 lb 9.6 oz)   BMI 36.13 kg/m²     Physical Exam  Vitals and nursing note reviewed. Constitutional:       General: He is not in acute distress. Appearance: He is well-developed. He is not ill-appearing. HENT:      Head: Normocephalic and atraumatic. Eyes:      General:         Right eye: No discharge. Left eye: No discharge. Conjunctiva/sclera: Conjunctivae normal.   Neck:      Trachea: No tracheal deviation. Comments: Mild L>R ant cervical adenopathy  Cardiovascular:      Rate and Rhythm: Normal rate and regular rhythm. Heart sounds: No murmur heard. Pulmonary:      Effort: Pulmonary effort is normal. No respiratory distress. Breath sounds: Normal breath sounds. No wheezing, rhonchi or rales. Musculoskeletal:         General: Tenderness present. No deformity or signs of injury. Cervical back: Neck supple. Comments: Cervical spine: nml flex/ext, decreased rotation B/L,  no pain with palp of spinous processes, point tenderness L paraspinal musculature approx C2-3, 5/5 B/L UE muscle strength   Lymphadenopathy:      Cervical: Cervical adenopathy present. Skin:     General: Skin is warm and dry. Coloration: Skin is not pale. Findings: No rash. Neurological:      General: No focal deficit present. Mental Status: He is alert. Mental status is at baseline. Sensory: No sensory deficit. Motor: No weakness or abnormal muscle tone.       Deep Tendon Reflexes: Reflexes normal.      Comments: 5/5 B/L UE muscle strength B/L UE, sensation intact to light touch B/L UE, 2/4 bicep DTR B/L UE   Psychiatric:         Behavior: Behavior normal.         Thought Content: Thought content normal.         Judgment: Judgment normal.       Franky Arroyo, DO

## 2023-08-25 NOTE — ASSESSMENT & PLAN NOTE
FBS and A1C both improved from last visit, con't to encourage healthy diet and regular exercise, recheck in 6 mos - WILL ORDER AT NEXT APPT

## 2023-08-25 NOTE — ASSESSMENT & PLAN NOTE
BP a bit elevated and only minimally improved by end of appt, pt admittedly a bit anxious over his adenopathy and fear of his lymphoma coming back, will con't current meds for now and recheck in 3 mos - if still > 140/90 will increase Amlodipine

## 2023-09-01 ENCOUNTER — HOSPITAL ENCOUNTER (OUTPATIENT)
Dept: CT IMAGING | Facility: HOSPITAL | Age: 68
Discharge: HOME | End: 2023-09-01
Payer: COMMERCIAL

## 2023-09-01 DIAGNOSIS — R59.0 ANTERIOR CERVICAL ADENOPATHY: ICD-10-CM

## 2023-09-01 DIAGNOSIS — E78.5 DYSLIPIDEMIA: ICD-10-CM

## 2023-09-01 DIAGNOSIS — Z85.72 HISTORY OF LYMPHOMA: ICD-10-CM

## 2023-09-01 PROCEDURE — 70491 CT SOFT TISSUE NECK W/DYE: CPT

## 2023-09-01 PROCEDURE — G1004 CDSM NDSC: HCPCS

## 2023-09-01 RX ORDER — ATORVASTATIN CALCIUM 20 MG/1
20 TABLET, FILM COATED ORAL DAILY
Qty: 90 TABLET | Refills: 0 | Status: SHIPPED | OUTPATIENT
Start: 2023-09-01

## 2023-09-01 RX ADMIN — IOHEXOL 80 ML: 350 INJECTION, SOLUTION INTRAVENOUS at 08:39

## 2023-09-04 DIAGNOSIS — I10 ESSENTIAL HYPERTENSION: ICD-10-CM

## 2023-09-05 RX ORDER — LOSARTAN POTASSIUM 100 MG/1
100 TABLET ORAL DAILY
Qty: 90 TABLET | Refills: 0 | Status: SHIPPED | OUTPATIENT
Start: 2023-09-05

## 2023-09-07 DIAGNOSIS — Z85.72 HISTORY OF LYMPHOMA: ICD-10-CM

## 2023-09-07 DIAGNOSIS — R59.0 ANTERIOR CERVICAL ADENOPATHY: Primary | ICD-10-CM

## 2023-09-08 DIAGNOSIS — I10 ESSENTIAL HYPERTENSION: ICD-10-CM

## 2023-09-08 RX ORDER — LOSARTAN POTASSIUM 100 MG/1
100 TABLET ORAL DAILY
Qty: 90 TABLET | Refills: 0 | OUTPATIENT
Start: 2023-09-08

## 2023-09-29 ENCOUNTER — HOSPITAL ENCOUNTER (OUTPATIENT)
Dept: RADIOLOGY | Age: 68
Discharge: HOME | End: 2023-09-29
Payer: COMMERCIAL

## 2023-09-29 DIAGNOSIS — Z85.72 HISTORY OF LYMPHOMA: ICD-10-CM

## 2023-09-29 DIAGNOSIS — D48.7 NEOPLASM OF UNCERTAIN BEHAVIOR OF LYMPH NODE: ICD-10-CM

## 2023-09-29 DIAGNOSIS — R59.0 ANTERIOR CERVICAL ADENOPATHY: ICD-10-CM

## 2023-09-29 LAB — GLUCOSE SERPL-MCNC: 104 MG/DL (ref 65–140)

## 2023-09-29 PROCEDURE — 82948 REAGENT STRIP/BLOOD GLUCOSE: CPT

## 2023-09-29 PROCEDURE — A9552 F18 FDG: HCPCS

## 2023-09-29 PROCEDURE — 78815 PET IMAGE W/CT SKULL-THIGH: CPT

## 2023-09-29 PROCEDURE — G1004 CDSM NDSC: HCPCS

## 2023-10-02 ENCOUNTER — TELEPHONE (OUTPATIENT)
Dept: FAMILY MEDICINE CLINIC | Facility: HOSPITAL | Age: 68
End: 2023-10-02

## 2023-10-02 NOTE — TELEPHONE ENCOUNTER
Patient left the following voicemail returning someone's call:    I had a PET scan done on Friday on the 29th of September and I got a call from this office that someone wanted to talk to me about the results of the PET scan. Please give me a call back as soon as possible, [phone]. Thank you.

## 2023-10-06 ENCOUNTER — TELEPHONE (OUTPATIENT)
Age: 68
End: 2023-10-06

## 2023-10-06 ENCOUNTER — HOSPITAL ENCOUNTER (OUTPATIENT)
Dept: RADIOLOGY | Facility: HOSPITAL | Age: 68
Discharge: HOME | End: 2023-10-06
Attending: ORTHOPAEDIC SURGERY
Payer: COMMERCIAL

## 2023-10-06 ENCOUNTER — OFFICE VISIT (OUTPATIENT)
Dept: OBGYN CLINIC | Facility: HOSPITAL | Age: 68
End: 2023-10-06
Payer: COMMERCIAL

## 2023-10-06 VITALS
SYSTOLIC BLOOD PRESSURE: 133 MMHG | BODY MASS INDEX: 36.09 KG/M2 | HEART RATE: 74 BPM | HEIGHT: 68 IN | WEIGHT: 238.1 LBS | DIASTOLIC BLOOD PRESSURE: 78 MMHG

## 2023-10-06 DIAGNOSIS — R52 PAIN: ICD-10-CM

## 2023-10-06 DIAGNOSIS — M47.812 OSTEOARTHRITIS OF CERVICAL SPINE, UNSPECIFIED SPINAL OSTEOARTHRITIS COMPLICATION STATUS: ICD-10-CM

## 2023-10-06 DIAGNOSIS — M54.2 CERVICAL PAIN (NECK): ICD-10-CM

## 2023-10-06 DIAGNOSIS — R52 PAIN: Primary | ICD-10-CM

## 2023-10-06 PROCEDURE — 72050 X-RAY EXAM NECK SPINE 4/5VWS: CPT

## 2023-10-06 PROCEDURE — 99204 OFFICE O/P NEW MOD 45 MIN: CPT | Performed by: ORTHOPAEDIC SURGERY

## 2023-10-06 RX ORDER — METHOCARBAMOL 500 MG/1
500 TABLET, FILM COATED ORAL
Qty: 14 TABLET | Refills: 0 | Status: SHIPPED | OUTPATIENT
Start: 2023-10-06

## 2023-10-06 NOTE — PROGRESS NOTES
Assessment & Plan/Medical Decision Makin y.o. male with Neck Pain and imaging findings most notable for Cervical Spondylosis       The clinical, physical and imaging findings were reviewed with the patient. Juan Ruiz  has a constellation of findings consistent with Cervical Facet/Arthrosis Pain, cervical myofascial pain in the setting of cervical degenerative disease. Ongoing left sided neck pain with stiffness x 2-3 months. Has improved since initial onset, however continues to be bothersome. Fortunately patient remains neurologically intact and functional. Physical exam showing decreased cervical range of motion. We discussed the treatment options including physical therapy, at home exercises, activity modifications, chiropractic medicine, oral medications, interventional spine procedures. At this time recommend continued conservative treatments. Referral placed for physical therapy to work on cervical ROM, strengthening, and stretching exercises. Maintain at home exercises and stretches. Can use heating pad in morning for additinoal relief. Robaxin rx sent to patient's pharmacy. Discussed reasoning for prescribing medication, proper usage, and potential side effects. Patient instructed to return to office/ER sooner if symptoms are not improving, getting worse, or new worrisome/neurologic symptoms arise. Patient will follow up as needed if pain does not improve with conservative treatment, can consider trigger point injections. Subjective:      Chief Complaint: Neck Pain    HPI:  Jasmyn Hanley is a 69 y.o. male presenting for initial visit with chief complaint of neck pain over past 2-3 months. Notes waking up with a stiff neck that hasn't gone away. Reports area of left sided neck pain and feeling like his neck "cracks" at times with increased stiffness. Worse in the morning, improves throughout the day.  He notes overall improvement since initial onset and pain is not debilitating, however continues to be bothersome. Denies radiation of pain into upper extremities or periscapular region, numbness/tingling or upper extremity weakness. Denies changes in balance or fine motor skills. Denies dropping items. Denies any bruce trauma. Denies fever or chills, no night sweats. Denies any bladder or bowel changes. [facility] lymphoma, recently had CT soft tissue of neck and PET scan to rule out recurrence of cancer. Denies heart or lung disease. Denies diabetes or kidney disease. Conservative therapy includes the following:   Medications: flexeril, tylenol, heat without significant improvement    Injections: denies   Physical Therapy: denies  Chiropractic Medicine: has not attempted  Accupunture/Massage Therapy: has not attempted   These therapeutic modalities were ineffective at providing sustained pain relief/functional improvement. 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 Also likes to fly fish. Living situation: Lives with family   ADLs: patient is able to perform     Objective:     Family History   Problem Relation Age of Onset   • Atrial fibrillation Mother    • COPD Father         Deciced   • Rheumatic fever Father    • Glaucoma Father         Deciced   • Alcohol abuse Father         Deciced   • Thyroid disease Sister    • Cancer Brother    • Hypertension Son    • Cancer Brother    • Diverticulitis Son        Past Medical History:   Diagnosis Date   • Colon polyp    • Hypertension 2016       Current Outpatient Medications   Medication Sig Dispense Refill   • amLODIPine (NORVASC) 5 mg tablet TAKE 1 TABLET (5 MG TOTAL) BY MOUTH DAILY.  90 tablet 1   • atorvastatin (LIPITOR) 20 mg tablet Take 1 tablet (20 mg total) by mouth daily 90 tablet 0   • losartan (COZAAR) 100 MG tablet Take 1 tablet (100 mg total) by mouth daily 90 tablet 0   • methocarbamol (ROBAXIN) 500 mg tablet Take 1 tablet (500 mg total) by mouth daily at bedtime as needed for muscle spasms May make you drowsy or dizzy. Do not drive or operate machinery until you know how this medication affects you, as it may cause lethargy and mental cloudiness. Drive with caution. May cause blurred vision. Avoid alcohol/other drugs that make you sleepy. 14 tablet 0     No current facility-administered medications for this visit. Past Surgical History:   Procedure Laterality Date   • COLONOSCOPY  01/18/2016    complete - serrted adenoma   • CYST REMOVAL     • DENTAL SURGERY  2017    Root canals due to cancer   • INCISION AND DRAINAGE ABSCESS / HEMATOMA OF [place] / [place] / THIGH  02/09/2015    rigjt upper, inner arm abscess   • PORTACATH PLACEMENT  2011    And removed     • AL ARTHRS KNE SURG W/MENISCECTOMY MED/LAT W/SHVG Left 11/19/2018    Procedure: ARTHROSCOPY KNEE PARTIAL  LATERAL MENISECTOMY;  Surgeon: Lola العلي MD;  Location: [facility] OR;  Service: Orthopedics   • TONSILLECTOMY      As kid   • VASECTOMY  1978       Social History     Socioeconomic History   • Marital status: /Civil Union     Spouse name: Not on file   • Number of children: Not on file   • Years of education: Not on file   • Highest education level: Not on file   Occupational History     Comment: full time employment    Tobacco Use   • Smoking status: Never   • Smokeless tobacco: Never   Vaping Use   • Vaping Use: Never used   Substance and Sexual Activity   • Alcohol use:  Yes     Alcohol/week: 0.0 standard drinks of alcohol     Comment: 1 / year   • Drug use: No   • Sexual activity: Never     Comment: Resides with Alize Skinner (Wife)   Other Topics Concern   • Not on file   Social History Narrative         Social Determinants of Health     Financial Resource Strain: Low Risk  (2/17/2023)    Overall Financial Resource Strain (CARDIA)    • Difficulty of Paying Living Expenses: Not hard at all   Food Insecurity: Not on file   Transportation Needs: No Transportation Needs (2/17/2023)    PRAPARE - Transportation    • Lack of Transportation (Medical): No    • Lack of Transportation (Non-Medical): No   Physical Activity: Not on file   Stress: Not on file   Social Connections: Not on file   Intimate Partner Violence: Not on file   Housing Stability: Not on file       No Known Allergies    Review of Systems  General- denies fever/chills  HEENT- denies hearing loss or sore throat  Eyes- denies eye pain or visual disturbances, denies red eyes  Respiratory- denies cough or SOB  Cardio- denies chest pain or palpitations  GI- denies abdominal pain  Endocrine- denies urinary frequency  Urinary- denies pain with urination  Musculoskeletal- Negative except noted above  Skin- denies rashes or wounds  Neurological- denies dizziness or headache  Psychiatric- denies anxiety or difficulty concentrating    Physical Exam  /78   Pulse 74   Ht 5' 8" (1.727 m)   Wt 108 kg (238 lb 1.6 oz)   BMI 36.20 kg/m²     General/Constitutional: No apparent distress: well-nourished and well developed. Lymphatic: No appreciable lymphadenopathy  Respiratory: Non-labored breathing  Vascular: No edema, swelling or tenderness, except as noted in detailed exam.  Integumentary: No impressive skin lesions present, except as noted in detailed exam.  Psych: Normal mood and affect, oriented to person, place and time. MSK: normal other than stated in HPI and exam  Gait & balance: no evidence of myelopathic gait, ambulates Independently     Cervical  spine range of motion:  -Forward flexion chin to chest  -Extension to 30  -Lateral bend 30 right, 30 left  -Rotation 35 right, 20 left. There is no point tenderness with palpation along the posterior cervical, thoracic, lumbar spine.      Neurologic:  Upper Extremity Motor Function    Right  Left    Deltoid  5/5  5/5    Bicep  5/5  5/5    Wrist extension  5/5  5/5    Tricep  5/5  5/5    Finger flexion/  5/5  5/5    Hand intrinsic  5/5  5/5      Lower Extremity Motor Function    Right  Left    Heel rise  5/5  5/5    Toe rise  5/5  5/5 Sensory: light touch is intact to bilateral upper and lower extremities     Reflexes:    Right Left   Biceps 2+ 2+   Triceps 2+ 2+   Brachioradialis 2+ 2+   Patellar 1+ 1+   Achilles 1+ 1+   Babinski neg neg     Other tests:  Spurling's: negative  Pompa's: negative  Inverted radial reflex: negative  Clonus: negative  Tandem gait: negative  Carpal Tinel/Phalen: negative bilateral   Cubital Tinel: negative bilateral   Shoulder: painless active & passive ROM bilateral     Diagnostic Tests   IMAGING: I have personally reviewed the images and these are my findings:  Cervical Spine X-rays from 10/6/2023: multi level cervical spondylosis with loss of disc height notably C5-6, C6-7, osteophyte formation, uncovertebral and facet hypertrophy, no apparent spondylolisthesis, no appreciated lytic/blastic lesions, no obvious instability    Electronic Medical Records were reviewed including previous imaging studies, Internal Medicine office note from 8/25/2023. Procedures, if performed today     None performed       Portions of the record may have been created with voice recognition software. Occasional wrong word or "sound a like" substitutions may have occurred due to the inherent limitations of voice recognition software. Read the chart carefully and recognize, using context, where substitutions have occurred.

## 2023-10-09 ENCOUNTER — EVALUATION (OUTPATIENT)
Dept: PHYSICAL THERAPY | Facility: CLINIC | Age: 68
End: 2023-10-09
Payer: COMMERCIAL

## 2023-10-09 DIAGNOSIS — M54.2 CERVICALGIA: ICD-10-CM

## 2023-10-09 DIAGNOSIS — M47.812 OSTEOARTHRITIS OF CERVICAL SPINE, UNSPECIFIED SPINAL OSTEOARTHRITIS COMPLICATION STATUS: ICD-10-CM

## 2023-10-09 DIAGNOSIS — M54.2 CERVICAL PAIN (NECK): ICD-10-CM

## 2023-10-09 PROCEDURE — 97161 PT EVAL LOW COMPLEX 20 MIN: CPT | Performed by: PHYSICAL THERAPIST

## 2023-10-09 PROCEDURE — 97110 THERAPEUTIC EXERCISES: CPT | Performed by: PHYSICAL THERAPIST

## 2023-10-09 PROCEDURE — 97010 HOT OR COLD PACKS THERAPY: CPT | Performed by: PHYSICAL THERAPIST

## 2023-10-09 NOTE — PROGRESS NOTES
PT Evaluation     Today's date: 10/9/2023  Patient name: Ana Aguilar  : 1955  MRN: [id number]  Referring provider: Ita Diaz MD  Dx:   Encounter Diagnosis     ICD-10-CM    1. Cervicalgia  M54.2 Ambulatory Referral to Physical Therapy     PT plan of care cert/re-cert    Persistent yet improved neck pain, PT discussed - pt agreeable to try after CT done and he knows its not his lymphoma, had no great benefit with Flexeril so isaac      2. Cervical pain (neck)  M54.2 Ambulatory referral to Physical Therapy     PT plan of care cert/re-cert      3. Osteoarthritis of cervical spine, unspecified spinal osteoarthritis complication status  E03.961 Ambulatory referral to Physical Therapy     PT plan of care cert/re-cert          Start Time: 845  Stop Time: 935  Total time in clinic (min): 50 minutes    Assessment  Assessment details: Patient is a 69 y.o. male with PT prescription for cervical pain and OA. Patient presents with decreased cervical spine AROM and joint mobility, soft tissue restrictions and trigger points along neck musculature, decreased deep neck flexor strength, decreased pectoralis and latissimus flexibility, and poor postural awareness and body mechanics. These impairments limit patient with driving, working around home, getting out of bed without pain, and social participation.  To address impairments and improve function, patient would benefit from skilled PT consisting of manual therapy to improve cervical spine ROM and joint mobility, therapeutic exercises to improve postural muscle strength and flexibility, neuromuscular reeducation to improve cervical stabilization, and patient education on proper posture and home program.    Impairments: abnormal muscle firing, abnormal or restricted ROM, impaired physical strength, lacks appropriate home exercise program, pain with function, poor posture  and poor body mechanics    Symptom irritability: moderateUnderstanding of Dx/Px/POC: good Prognosis: good    Goals  Short term goals:  Patient is independent in home program to support plan of care and improve function. - 2 weeks  Patient demonstrates at least 45 deg. with left cervical rotation AROM so he can drive with less pain. -2 weeks  Patient demonstrates proper cervical spine posture without cues so he can work around home with less pain. - 2 weeks    Long term goals:  Patient demonstrates improvement in community participation with increase in FOTO score by 15%. - 8 weeks  Patient can get out of bed without neck pain for improved quality of life. - 6 weeks  Patient demonstrates improved deep neck flexor strength with increase in endurance test to 30 seconds so he can perform ADLs with proper posture and with less pain. - 8 weeks  Patient demonstrates 90% cervical AROM in all directions without pain so can perform all ADLs/IADLs without difficulty. 8 weeks      Plan  Patient would benefit from: skilled physical therapy  Planned therapy interventions: activity modification, joint mobilization, manual therapy, massage, neuromuscular re-education, patient education, postural training, strengthening, stretching, body mechanics training, therapeutic exercise, flexibility, functional ROM exercises, home exercise program and therapeutic activities  Frequency: 2x week  Duration in weeks: 8  Plan of Care beginning date: 10/9/2023  Plan of Care expiration date: 12/8/2023  Treatment plan discussed with: patient        Subjective Evaluation    History of Present Illness  Date of onset: 7/1/2023  Mechanism of injury: Patient has prescription for neck pain and cervical OA. Patient reports history of stiff neck, especially when turning to the left. Onset around 3 months ago, no aggravating incident or obvious cause. He was concerned about his history of cancer so he had CT scan and PET scan which were normal.    Symptoms: left sided neck pain.  Pain increases with turning head to left (especially with driving), looking up, getting out of bed, worse in the morning but improves as day goes on. Patient sleeps on his left side, which might increase pain as well. Patient denies paraesthesias, (including facial paraesthesias) difficulty swallowing, visual disturbances, unexplained weight loss, tinnitus, or fever. PMH: nonhodgkins lymphoma 14 years ago, HTN (medication controlled)  Patient Goals  Patient goals for therapy: decreased pain  Patient goal: drive without pain  Pain  Current pain rating: 3 (8/10 with movement)  At best pain ratin  At worst pain ratin          Objective     Postural Observations  Seated posture: poor    Additional Postural Observation Details  Forward head, rounded shoulders, depressed abducted scapula    Palpation   Left   Hypertonic in the cervical interspinals and upper trapezius. Tenderness of the cervical interspinals. Trigger point to upper trapezius. Right   No palpable tenderness to the cervical interspinals. Hypertonic in the upper trapezius. Trigger point to upper trapezius.      Active Range of Motion   Cervical/Thoracic Spine       Cervical    Flexion: 32 degrees   Extension: 14 degrees     with pain  Left lateral flexion: 14 degrees     with pain  Right lateral flexion: 16 degrees     with pain  Left rotation: 38 (55 deg. following manual therapy) degrees with pain  Right rotation: 57 degrees    with pain    Passive Range of Motion     Additional Passive Range of Motion Details  Mild pec minor tightness b/l  Mild pec major tightness on R, moderate on L  Mild latissimus tightness b/l    Joint Play   Joints within functional limits: C2, C3 and C4     Hypomobile: C5, C6, C7, T1 and T2     Pain: C3 and C4     Tests   Cervical   Positive neck flexor muscle endurance test.  Negative alar ligament test and transverse ligament test.     Left   Negative cervical flexion-rotation test.     Right   Negative cervical flexion-rotation test.     Additional Tests Details  15 seconds with deep neck flexor endurance test             Precautions: HTN, h/o cancer.  EPOC 12/8/23      Manuals 10/9            Distraction/sub occipital release             Upglides and side glides cervical spine gr 3-4 CB            STM L paraspinals  CB                         Neuro Re-Ed                          Chin tuck             Scap retraction 10"            shld ext with retraction             Rows with retraction             Ther Ex             UT stretch 10"            Doorway pec stretch             Pt edu on plan of care, pathology, home program 8'            Cervical rotation AROM 10"                                                                                                                                 Ther Activity                                       Gait Training                                       Modalities             Moist hot pack 8'

## 2023-10-11 ENCOUNTER — OFFICE VISIT (OUTPATIENT)
Dept: PHYSICAL THERAPY | Facility: CLINIC | Age: 68
End: 2023-10-11
Payer: COMMERCIAL

## 2023-10-11 DIAGNOSIS — M54.2 CERVICAL PAIN (NECK): ICD-10-CM

## 2023-10-11 DIAGNOSIS — M54.2 CERVICALGIA: Primary | ICD-10-CM

## 2023-10-11 DIAGNOSIS — M47.812 OSTEOARTHRITIS OF CERVICAL SPINE, UNSPECIFIED SPINAL OSTEOARTHRITIS COMPLICATION STATUS: ICD-10-CM

## 2023-10-11 PROCEDURE — 97140 MANUAL THERAPY 1/> REGIONS: CPT | Performed by: PHYSICAL THERAPIST

## 2023-10-11 PROCEDURE — 97112 NEUROMUSCULAR REEDUCATION: CPT | Performed by: PHYSICAL THERAPIST

## 2023-10-11 PROCEDURE — 97010 HOT OR COLD PACKS THERAPY: CPT | Performed by: PHYSICAL THERAPIST

## 2023-10-11 PROCEDURE — 97110 THERAPEUTIC EXERCISES: CPT | Performed by: PHYSICAL THERAPIST

## 2023-10-11 NOTE — PROGRESS NOTES
Daily Note     Today's date: 10/11/2023  Patient name: Charley Johnson  : 1955  MRN: [id number]  Referring provider: Darby Aguilar MD  Dx:   Encounter Diagnosis     ICD-10-CM    1. Cervicalgia  M54.2       2. Cervical pain (neck)  M54.2       3. Osteoarthritis of cervical spine, unspecified spinal osteoarthritis complication status  X36.217                      Subjective: patient reports that he has been doing neck exercises and feels that his mobility has improved. He still gets about 6-7/10 pain when rotating to the left. Objective: See treatment diary below      Assessment: Tolerated treatment well. Patient displayed significant improvement in cervical AROM at start of session compared to evaluation. Began session with moist hot pack to reduce pain and irritability of cervical spine. Continued tenderness along left paraspinals. Educated patient on proper pillow positioning at night when sleeping to reduce strain to neck. Patient would benefit from continued PT per plan of care to address impairments and meet goals. Plan: Progress treatment as tolerated. Precautions: HTN, h/o cancer.  EPOC 23      Manuals 10/9 10/11           Distraction/sub occipital release  CB           Upglides and side glides cervical spine gr 3-4 CB CB           STM L paraspinals  CB CB                        Neuro Re-Ed                          Chin tuck  10x3"           Scap retraction 10" 5x10"           shld ext with retraction  10x rtb           Rows with retraction             Ther Ex             UT stretch 10" 5x10" ea           Doorway pec major stretchstretch  5x10" ea           Pt edu on plan of care, pathology, home program 8' 3'           Cervical rotation AROM 10" 5x10" ea                                                                                                                                Ther Activity                                       Gait Training Modalities             Moist hot pack  8'

## 2023-10-16 ENCOUNTER — OFFICE VISIT (OUTPATIENT)
Dept: PHYSICAL THERAPY | Facility: CLINIC | Age: 68
End: 2023-10-16
Payer: COMMERCIAL

## 2023-10-16 DIAGNOSIS — M47.812 OSTEOARTHRITIS OF CERVICAL SPINE, UNSPECIFIED SPINAL OSTEOARTHRITIS COMPLICATION STATUS: ICD-10-CM

## 2023-10-16 DIAGNOSIS — M54.2 CERVICALGIA: Primary | ICD-10-CM

## 2023-10-16 DIAGNOSIS — M54.2 CERVICAL PAIN (NECK): ICD-10-CM

## 2023-10-16 PROCEDURE — 97140 MANUAL THERAPY 1/> REGIONS: CPT | Performed by: PHYSICAL THERAPIST

## 2023-10-16 PROCEDURE — 97010 HOT OR COLD PACKS THERAPY: CPT | Performed by: PHYSICAL THERAPIST

## 2023-10-16 PROCEDURE — 97110 THERAPEUTIC EXERCISES: CPT | Performed by: PHYSICAL THERAPIST

## 2023-10-16 PROCEDURE — 97112 NEUROMUSCULAR REEDUCATION: CPT | Performed by: PHYSICAL THERAPIST

## 2023-10-16 NOTE — PROGRESS NOTES
Daily Note     Today's date: 10/16/2023  Patient name: Angelita Chatman  : 1955  MRN: [id number]  Referring provider: Mi Valdovinos MD  Dx:   Encounter Diagnosis     ICD-10-CM    1. Cervicalgia  M54.2       2. Cervical pain (neck)  M54.2       3. Osteoarthritis of cervical spine, unspecified spinal osteoarthritis complication status  N96.118                      Subjective: patient reports that he feels really good when he leaves therapy, but it lasts only a couple hours. He still has a lot of pain in the morning, but has almost no pain by the end of the day. Objective: See treatment diary below      Assessment: Tolerated treatment well. Patient continues to have tenderness on left cervical paraspinals, mid to upper cervical. He also still has pain with joint mobilizations into left rotation and side bending. He notes less pain with left cervical rotation by end of session however. Patient would benefit from continued PT per plan of care to address impairments and meet goals. Plan: Progress treatment as tolerated. Precautions: HTN, h/o cancer.  EPOC 23      Manuals 10/9 10/11 10/16          Distraction/sub occipital release  CB CB          Upglides and side glides cervical spine gr 3-4 CB CB CB          STM L paraspinals  CB CB CB          P-a mobs gr 3-4 upper Tspine CTJ   CB          Neuro Re-Ed                          Chin tuck  10x3" 10x5"          Scap retraction 10" 5x10"           shld ext with retraction  10x rtb 2x10 gtb          Rows with retraction             Ther Ex             UT stretch 10" 5x10" ea 5x10" ea          Doorway pec major stretchstretch  5x10" ea 4x20"           Pt edu on plan of care, pathology, home program 8' 3' 2'          Cervical rotation AROM 10" 5x10" ea 5x10" ea          Extension snags  10x 10x          OA rotation   5x ea                                                                                                     Ther Activity Gait Training                                       Modalities             Moist hot pack  8' 8'

## 2023-10-18 ENCOUNTER — APPOINTMENT (OUTPATIENT)
Dept: PHYSICAL THERAPY | Facility: CLINIC | Age: 68
End: 2023-10-18
Payer: COMMERCIAL

## 2023-10-19 ENCOUNTER — OFFICE VISIT (OUTPATIENT)
Dept: PHYSICAL THERAPY | Facility: CLINIC | Age: 68
End: 2023-10-19
Payer: COMMERCIAL

## 2023-10-19 DIAGNOSIS — M47.812 OSTEOARTHRITIS OF CERVICAL SPINE, UNSPECIFIED SPINAL OSTEOARTHRITIS COMPLICATION STATUS: ICD-10-CM

## 2023-10-19 DIAGNOSIS — M54.2 CERVICAL PAIN (NECK): ICD-10-CM

## 2023-10-19 DIAGNOSIS — M54.2 CERVICALGIA: Primary | ICD-10-CM

## 2023-10-19 PROCEDURE — 97110 THERAPEUTIC EXERCISES: CPT | Performed by: PHYSICAL THERAPIST

## 2023-10-19 PROCEDURE — 97112 NEUROMUSCULAR REEDUCATION: CPT | Performed by: PHYSICAL THERAPIST

## 2023-10-19 PROCEDURE — 97140 MANUAL THERAPY 1/> REGIONS: CPT | Performed by: PHYSICAL THERAPIST

## 2023-10-19 NOTE — PROGRESS NOTES
Daily Note     Today's date: 10/19/2023  Patient name: Jasmyn Hanley  : 1955  MRN: [id number]  Referring provider: Angela Eli MD  Dx:   Encounter Diagnosis     ICD-10-CM    1. Cervicalgia  M54.2       2. Cervical pain (neck)  M54.2       3. Osteoarthritis of cervical spine, unspecified spinal osteoarthritis complication status  C06.298                      Subjective: Juan Splinter explains that he is feeling a little better, he has been consistent with his home program.       Objective: See treatment diary below      Assessment: Tolerated treatment well. Patient demonstrated fatigue post treatment and would benefit from continued PT. Cueing to correct shoulder position as well as periscapular activation during band strengthening, furnished pt with band for home and added to HEP. Slight discomfort in neck during upper trapezius stretch however no discomfort during OA rotation. Plan: Continue per plan of care. Precautions: HTN, h/o cancer.  EPOC 23      Manuals 10/9 10/11 10/16 10/19         Distraction/sub occipital release  CB VIVAR RN         Upglides and side glides cervical spine gr 3-4 CB VIVAR JP RN         STM L paraspinals  CB VIVAR JP RN         P-a mobs gr 3-4 upper Tspine CTJ   CB RN         Neuro Re-Ed                          Chin tuck  10x3" 10x5" 10x5''         Scap retraction 10" 5x10"           shld ext with retraction  10x rtb 2x10 gtb 2x10 GTB         Rows with retraction    2x10 GTB         Ther Ex             UT stretch 10" 5x10" ea 5x10" ea 5x10'' ea         Doorway pec major stretch  5x10" ea 4x20"  4x20''         Pt edu on plan of care, pathology, home program 8' 3' 2'          Cervical rotation AROM 10" 5x10" ea 5x10" ea 5x10'' ea         Extension snags  10x 10x 10x         OA rotation   5x ea 10x ea                                                                                                    Ther Activity                                       Gait Training Modalities             Moist hot pack  8' 8'

## 2023-10-24 ENCOUNTER — OFFICE VISIT (OUTPATIENT)
Dept: PHYSICAL THERAPY | Facility: CLINIC | Age: 68
End: 2023-10-24
Payer: COMMERCIAL

## 2023-10-24 DIAGNOSIS — M54.2 CERVICAL PAIN (NECK): ICD-10-CM

## 2023-10-24 DIAGNOSIS — M47.812 OSTEOARTHRITIS OF CERVICAL SPINE, UNSPECIFIED SPINAL OSTEOARTHRITIS COMPLICATION STATUS: ICD-10-CM

## 2023-10-24 DIAGNOSIS — M54.2 CERVICALGIA: Primary | ICD-10-CM

## 2023-10-24 PROCEDURE — 97140 MANUAL THERAPY 1/> REGIONS: CPT | Performed by: PHYSICAL THERAPIST

## 2023-10-24 PROCEDURE — 97010 HOT OR COLD PACKS THERAPY: CPT | Performed by: PHYSICAL THERAPIST

## 2023-10-24 PROCEDURE — 97110 THERAPEUTIC EXERCISES: CPT | Performed by: PHYSICAL THERAPIST

## 2023-10-24 NOTE — PROGRESS NOTES
Daily Note     Today's date: 10/24/2023  Patient name: Ru Rodriguez  : 1955  MRN: [id number]  Referring provider: Chriss Schmitt MD  Dx:   Encounter Diagnosis     ICD-10-CM    1. Cervicalgia  M54.2       2. Cervical pain (neck)  M54.2       3. Osteoarthritis of cervical spine, unspecified spinal osteoarthritis complication status  Y38.911                      Subjective: Patient reports that today is the best his neck has felt in awhile. Objective: See treatment diary below      Assessment: Tolerated treatment well. Patient continues to present with soft tissue restrictions along upper trapezius and left paraspinals. However, he displayed less pain with upglide joint restriction. Patient noted slight increased soreness by end of session. Overall, patient is making gradual improvements with therapy but requires continued skilled PT to further progress upon impairments and meet long term goals. Plan: Continue per plan of care. Precautions: HTN, h/o cancer.  EPOC 23      Manuals 10/9 10/11 10/16 10/19 10/24        Distraction/sub occipital release  CB VIVAR RN CB        Upglides and side glides cervical spine gr 3-4 CB CB CB RN CB        STM L paraspinals  CB CB CB RN CB        P-a mobs gr 3-4 upper Tspine CTJ   CB RN CB        Neuro Re-Ed                          Chin tuck  10x3" 10x5" 10x5'' 2x10        Scap retraction 10" 5x10"           shld ext with retraction  10x rtb 2x10 gtb 2x10 GTB         Rows with retraction    2x10 GTB         Ther Ex             UT stretch 10" 5x10" ea 5x10" ea 5x10'' ea         Doorway pec major stretch  5x10" ea 4x20"  4x20''         Pt edu on plan of care, pathology, home program 8' 3' 2'          Cervical rotation AROM 10" 5x10" ea 5x10" ea 5x10'' ea 5x10"        Extension snags  10x 10x 10x 10x        OA rotation   5x ea 10x ea 10 ea                                                                                                   Ther Activity Gait Training                                       Modalities             Moist hot pack  8' 8'  8' start

## 2023-10-26 ENCOUNTER — APPOINTMENT (OUTPATIENT)
Dept: PHYSICAL THERAPY | Facility: CLINIC | Age: 68
End: 2023-10-26
Payer: COMMERCIAL

## 2023-10-31 ENCOUNTER — OFFICE VISIT (OUTPATIENT)
Dept: PHYSICAL THERAPY | Facility: CLINIC | Age: 68
End: 2023-10-31
Payer: COMMERCIAL

## 2023-10-31 DIAGNOSIS — M54.2 CERVICAL PAIN (NECK): ICD-10-CM

## 2023-10-31 DIAGNOSIS — M54.2 CERVICALGIA: Primary | ICD-10-CM

## 2023-10-31 DIAGNOSIS — M47.812 OSTEOARTHRITIS OF CERVICAL SPINE, UNSPECIFIED SPINAL OSTEOARTHRITIS COMPLICATION STATUS: ICD-10-CM

## 2023-10-31 PROCEDURE — 97110 THERAPEUTIC EXERCISES: CPT

## 2023-10-31 PROCEDURE — 97112 NEUROMUSCULAR REEDUCATION: CPT

## 2023-10-31 PROCEDURE — 97140 MANUAL THERAPY 1/> REGIONS: CPT

## 2023-10-31 NOTE — PROGRESS NOTES
Daily Note     Today's date: 10/31/2023  Patient name: Marlena Cunningham  : 1955  MRN: [id number]  Referring provider: Aditya Sauceda MD  Dx:   Encounter Diagnosis     ICD-10-CM    1. Cervicalgia  M54.2       2. Cervical pain (neck)  M54.2       3. Osteoarthritis of cervical spine, unspecified spinal osteoarthritis complication status  Q44.926                      Subjective: Patient reports his neck is feeling stiff today. Continues to have crepitus in his neck on a daily basis. Patient reports a pain level of 5/10 today. Denies any radiating arm pain. Objective: See treatment diary below      Assessment: Tolerated treatment well. Progressed with thoracic extension exercises with decreased mobility noted. Soreness noted in left mid cervical area. Patient will monitor soreness over the day. Overall, patient is making gradual improvements with therapy but requires continued skilled PT to further progress upon impairments and meet long term goals. Plan: Continue per plan of care. Precautions: HTN, h/o cancer.  EPOC 23      Manuals 10/9 10/11 10/16 10/19 10/24 10/31       Distraction/sub occipital release  CB VIVAR RN, JP ksg       Upglides and side glides cervical spine gr 3-4 CB CB CB RN CB ksg       STM L paraspinals  CB CB VIVAR RN CB ksg       P-a mobs gr 3-4 upper Tspine CTJ   CB RN CB ksg       Neuro Re-Ed                          Chin tuck  10x3" 10x5" 10x5'' 2x10 2x10       Scap retraction 10" 5x10"           shld ext with retraction  10x rtb 2x10 gtb 2x10 GTB  2x10 GTB       Rows with retraction    2x10 GTB  2x10  GTB       Ther Ex             UT stretch 10" 5x10" ea 5x10" ea 5x10'' ea  5x10" ea       Doorway pec major stretch  5x10" ea 4x20"  4x20''  4x20"       Pt edu on plan of care, pathology, home program 8' 3' 2'          Cervical rotation AROM 10" 5x10" ea 5x10" ea 5x10'' ea 5x10" 5x10"       Extension snags  10x 10x 10x 10x 10x       OA rotation   5x ea 10x ea 10 ea 10 ea        Seated thoracic ext with 1/2 foam roll      5"x15       Standing thoracic ext with arms overhead against wall      10" x10                                                                        Ther Activity                                       Gait Training                                       Modalities             Moist hot pack  8' 8'  8' start 8' start

## 2023-11-02 ENCOUNTER — OFFICE VISIT (OUTPATIENT)
Dept: PHYSICAL THERAPY | Facility: CLINIC | Age: 68
End: 2023-11-02
Payer: COMMERCIAL

## 2023-11-02 DIAGNOSIS — M47.812 OSTEOARTHRITIS OF CERVICAL SPINE, UNSPECIFIED SPINAL OSTEOARTHRITIS COMPLICATION STATUS: ICD-10-CM

## 2023-11-02 DIAGNOSIS — M54.2 CERVICAL PAIN (NECK): ICD-10-CM

## 2023-11-02 DIAGNOSIS — M54.2 CERVICALGIA: Primary | ICD-10-CM

## 2023-11-02 PROCEDURE — 97112 NEUROMUSCULAR REEDUCATION: CPT | Performed by: PHYSICAL THERAPIST

## 2023-11-02 PROCEDURE — 97110 THERAPEUTIC EXERCISES: CPT | Performed by: PHYSICAL THERAPIST

## 2023-11-02 PROCEDURE — 97140 MANUAL THERAPY 1/> REGIONS: CPT | Performed by: PHYSICAL THERAPIST

## 2023-11-02 PROCEDURE — 97010 HOT OR COLD PACKS THERAPY: CPT | Performed by: PHYSICAL THERAPIST

## 2023-11-02 NOTE — PROGRESS NOTES
Daily Note     Today's date: 2023  Patient name: Jasmyn Hanley  : 1955  MRN: [id number]  Referring provider: Angela Eli MD  Dx:   Encounter Diagnosis     ICD-10-CM    1. Cervicalgia  M54.2       2. Cervical pain (neck)  M54.2       3. Osteoarthritis of cervical spine, unspecified spinal osteoarthritis complication status  W74.281                      Subjective: Patient reports that his neck was feeling pretty good prior to going on his trip and it wasn't clicking as much. He didn't stretch as much while away, and clicking and soreness came back. However, it has been feeling better since last PT session. Objective: See treatment diary below      Assessment: Tolerated treatment well. Performed IASTM along cervical paraspinals, tenderness and soft tissue restriction noted. Slight improvement in pain with left cervical rotation following. Patient required tactile cuing to reduce UT compensation and scapular elevation. Overall, patient is making gradual improvements with therapy but requires continued skilled PT to further progress upon impairments and meet long term goals. Plan: Continue per plan of care. Precautions: HTN, h/o cancer.  EPOC 23      Manuals 10/9 10/11 10/16 10/19 10/24 10/31 11/2      Distraction/sub occipital release  CB VIVAR RN CB gutierrez CB      Upglides and side glides cervical spine gr 3-4 CB CB VIVAR RN CB gutierrez CB      STM L paraspinals  CB VIVAR JP RN CB gutierrez JP IASTM b/l      P-a mobs gr 3-4 upper Tspine CTJ   CB RN CB gutierrez JP      Neuro Re-Ed             Chin tuck with lift       x10      Chin tuck  10x3" 10x5" 10x5'' 2x10 2x10 x10      Scap retraction 10" 5x10"           shld ext with retraction  10x rtb 2x10 gtb 2x10 GTB  2x10 GTB 2x10  GTB      Rows with retraction    2x10 GTB  2x10  GTB 2x10  GTB      Ther Ex             UT stretch 10" 5x10" ea 5x10" ea 5x10'' ea  5x10" ea 3x15" L only      Doorway pec major stretch  5x10" ea 4x20"  4x20''  4x20" 3x30"      Pt edu on plan of care, pathology, home program 8' 3' 2'          Cervical rotation AROM 10" 5x10" ea 5x10" ea 5x10'' ea 5x10" 5x10"       Extension snags  10x 10x 10x 10x 10x       OA rotation   5x ea 10x ea 10 ea 10 ea  10 ea      Seated thoracic ext with 1/2 foam roll      5"x15       Standing thoracic ext with arms overhead against wall      10" x10                                                                        Ther Activity                                       Gait Training                                       Modalities             Moist hot pack  8' 8'  8' start 8' start 8' start

## 2023-11-07 ENCOUNTER — OFFICE VISIT (OUTPATIENT)
Dept: PHYSICAL THERAPY | Facility: CLINIC | Age: 68
End: 2023-11-07
Payer: COMMERCIAL

## 2023-11-07 DIAGNOSIS — M47.812 OSTEOARTHRITIS OF CERVICAL SPINE, UNSPECIFIED SPINAL OSTEOARTHRITIS COMPLICATION STATUS: ICD-10-CM

## 2023-11-07 DIAGNOSIS — M54.2 CERVICAL PAIN (NECK): ICD-10-CM

## 2023-11-07 DIAGNOSIS — M54.2 CERVICALGIA: Primary | ICD-10-CM

## 2023-11-07 PROCEDURE — 97140 MANUAL THERAPY 1/> REGIONS: CPT | Performed by: PHYSICAL THERAPIST

## 2023-11-07 PROCEDURE — 97112 NEUROMUSCULAR REEDUCATION: CPT | Performed by: PHYSICAL THERAPIST

## 2023-11-07 PROCEDURE — 97110 THERAPEUTIC EXERCISES: CPT | Performed by: PHYSICAL THERAPIST

## 2023-11-07 PROCEDURE — 97010 HOT OR COLD PACKS THERAPY: CPT | Performed by: PHYSICAL THERAPIST

## 2023-11-07 NOTE — PROGRESS NOTES
Daily Note     Today's date: 2023  Patient name: Angelita Chatman  : 1955  MRN: [id number]  Referring provider: Mi Valdovinos MD  Dx:   Encounter Diagnosis     ICD-10-CM    1. Cervicalgia  M54.2       2. Cervical pain (neck)  M54.2       3. Osteoarthritis of cervical spine, unspecified spinal osteoarthritis complication status  R08.080                      Subjective: Patient reports that his neck doesn't seem to be getting any better or worse, but it is still better than start of PT. At worst, 6/10 pain in the past week. He has been trying to be more aware of posture. Objective: See treatment diary below    52/62 deg L cervical AROM (pre/post rotation snags)    Assessment: Tolerated treatment well. Patient continues to report pain with palpation to mid/upper cervical spine on L side. Performed transverse mobilizations to upper cervical spine, rotation AROM was less painful to L side following. Instructed patient in self rotation snags to the left, significant improvement in AROM was noted following and no pain during stretch. Instructed patient on performing this rotation exercise at home vs full rotation or OA rotation due to less pain noted during. Patient would benefit from continued skilled PT per plan of care to address impairments and improve function. Plan: Continue per plan of care. Progress Report NV     Precautions: HTN, h/o cancer.  EPOC 23      Manuals 10/9 10/11 10/16 10/19 10/24 10/31 11/2 11/7 [facility]    Distraction/sub occipital release  CB VIVAR RN CB ksg CB CB     Upglides and side glides cervical spine gr 3-4 CB CB VIVAR RN CB ksg CB CB transverse L     STM L paraspinals  CB CB CB RN CB ksg CB IASTM b/l CB IASTM b/l     P-a mobs gr 3-4 upper Tspine CTJ   CB RN CB ksg CB      Neuro Re-Ed             Chin tuck with lift       x10 x10     Chin tuck  10x3" 10x5" 10x5'' 2x10 2x10 x10 x10     Scap retraction 10" 5x10"           shld ext with retraction  10x rtb 2x10 gtb 2x10 GTB 2x10 GTB 2x10  GTB 2x10  GTB     Rows with retraction    2x10 GTB  2x10  GTB 2x10  GTB      Ther Ex             UT stretch 10" 5x10" ea 5x10" ea 5x10'' ea  5x10" ea 3x15" L only 3x15" L only     Doorway pec major stretch  5x10" ea 4x20"  4x20''  4x20" 3x30" 3x30"     Pt edu on plan of care, pathology, home program 8' 3' 2'     3'     Cervical rotation AROM 10" 5x10" ea 5x10" ea 5x10'' ea 5x10" 5x10"  hold     Extension snags  10x 10x 10x 10x 10x       OA rotation   5x ea 10x ea 10 ea 10 ea  10 ea 10 ea hold     Seated thoracic ext with 1/2 foam roll      5"x15       Standing thoracic ext with arms overhead against wall      10" x10                                                                        Ther Activity                                       Gait Training                                       Modalities             Moist hot pack  8' 8'  8' start 8' start 8' start 8' start

## 2023-11-09 ENCOUNTER — OFFICE VISIT (OUTPATIENT)
Dept: PHYSICAL THERAPY | Facility: CLINIC | Age: 68
End: 2023-11-09
Payer: COMMERCIAL

## 2023-11-09 DIAGNOSIS — M47.812 OSTEOARTHRITIS OF CERVICAL SPINE, UNSPECIFIED SPINAL OSTEOARTHRITIS COMPLICATION STATUS: ICD-10-CM

## 2023-11-09 DIAGNOSIS — M54.2 CERVICAL PAIN (NECK): ICD-10-CM

## 2023-11-09 DIAGNOSIS — M54.2 CERVICALGIA: Primary | ICD-10-CM

## 2023-11-09 PROCEDURE — 97010 HOT OR COLD PACKS THERAPY: CPT | Performed by: PHYSICAL THERAPIST

## 2023-11-09 PROCEDURE — 97140 MANUAL THERAPY 1/> REGIONS: CPT | Performed by: PHYSICAL THERAPIST

## 2023-11-09 PROCEDURE — 97110 THERAPEUTIC EXERCISES: CPT | Performed by: PHYSICAL THERAPIST

## 2023-11-09 PROCEDURE — 97112 NEUROMUSCULAR REEDUCATION: CPT | Performed by: PHYSICAL THERAPIST

## 2023-11-09 NOTE — PROGRESS NOTES
Progress Report     Today's date: 2023  Patient name: Stacey Norwood  : 1955  MRN: [id number]  Referring provider: Bari Dahl MD  Dx:   Encounter Diagnosis     ICD-10-CM    1. Cervicalgia  M54.2       2. Cervical pain (neck)  M54.2       3. Osteoarthritis of cervical spine, unspecified spinal osteoarthritis complication status  L12.153                      Subjective: Patient reports that his neck tightness seems to resolve quicker. "The morning pain isn't nearly like it was." At worst, 5/10 pain in the past week. He has most discomfort if falling asleep on his R side, with head tilted to the left. Objective: See treatment diary below    68 deg L cervical AROM   72 deg. R cervical AROM  42 deg. Extension AROM  22 R side bending AROM  16 L side bending AROM    29 seconds deep neck flexor endurance test    Assessment: Patient has been treated for 9 sessions since evaluation 1 month ago. Treatment has consisted of manual therapy to improve joint mobility and soft tissue restrictions, therapeutic exercises and activities to improve postural muscle strength and flexibility, neuromuscular reeducation to improve postural stabilization, and patient education on home program. Objectively, patient demonstrates significant improvements in cervical spine AROM and in deep neck flexor test. He continues to have limited cervical spine mobility, especially with side bending AROM b/l. Functionally, patient notes less intense pain cm Patient is responding well to treatment so far and would benefit from continued skilled PT per plan of care to address impairments and improve function. Plan: Continue per plan of care. Progress mobility as tolerated. Precautions: HTN, h/o cancer.  EPOC 23      Manuals 10/9 10/11 10/16 10/19 10/24 10/31 11/2 11/7 11/9    Distraction/sub occipital release  CB VIVAR RN CB gutierrez JP, JP, JP    Upglides and side glides cervical spine gr 3-4 CB VIVAR JP RN CB ksg Daina Knott transverse L CB cleared transverse    STM L paraspinals  CB CB CB RN CB ksg CB IASTM b/l CB IASTM b/l CB L    P-a mobs gr 3-4 upper Tspine CTJ   CB RN CB ksg CB      Neuro Re-Ed             Chin tuck with lift       x10 x10 Neck flexor endurance test    Chin tuck  10x3" 10x5" 10x5'' 2x10 2x10 x10 x10 x10    Scap retraction 10" 5x10"           shld ext with retraction  10x rtb 2x10 gtb 2x10 GTB  2x10 GTB 2x10  GTB 2x10  GTB 2x10 gtb    Rows with retraction    2x10 GTB  2x10  GTB 2x10  GTB      Ther Ex             UT stretch 10" 5x10" ea 5x10" ea 5x10'' ea  5x10" ea 3x15" L only 3x15" L only 5x15" L only    Doorway pec major stretch  5x10" ea 4x20"  4x20''  4x20" 3x30" 3x30" 3x30"    Pt edu on plan of care, pathology, home program 8' 3' 2'     3' 3'    Cervical rotation AROM 10" 5x10" ea 5x10" ea 5x10'' ea 5x10" 5x10"  hold     Extension snags  10x 10x 10x 10x 10x       OA rotation   5x ea 10x ea 10 ea 10 ea  10 ea 10 ea hold     Seated thoracic ext with 1/2 foam roll      5"x15       Standing thoracic ext with arms overhead against wall      10" x10                                                                        Ther Activity                                       Gait Training                                       Modalities             Moist hot pack  8' 8'  8' start 8' start 8' start 8' start 8' start

## 2023-11-14 ENCOUNTER — OFFICE VISIT (OUTPATIENT)
Dept: PHYSICAL THERAPY | Facility: CLINIC | Age: 68
End: 2023-11-14
Payer: COMMERCIAL

## 2023-11-14 DIAGNOSIS — M47.812 OSTEOARTHRITIS OF CERVICAL SPINE, UNSPECIFIED SPINAL OSTEOARTHRITIS COMPLICATION STATUS: ICD-10-CM

## 2023-11-14 DIAGNOSIS — M54.2 CERVICAL PAIN (NECK): ICD-10-CM

## 2023-11-14 DIAGNOSIS — M54.2 CERVICALGIA: Primary | ICD-10-CM

## 2023-11-14 PROCEDURE — 97140 MANUAL THERAPY 1/> REGIONS: CPT | Performed by: PHYSICAL THERAPIST

## 2023-11-14 PROCEDURE — 97112 NEUROMUSCULAR REEDUCATION: CPT | Performed by: PHYSICAL THERAPIST

## 2023-11-14 PROCEDURE — 97110 THERAPEUTIC EXERCISES: CPT | Performed by: PHYSICAL THERAPIST

## 2023-11-14 NOTE — PROGRESS NOTES
Daily Note    Today's date: 2023  Patient name: Joleen Wadsworth  : 1955  MRN: [id number]  Referring provider: Paradise Sanchez MD  Dx:   Encounter Diagnosis     ICD-10-CM    1. Cervicalgia  M54.2       2. Cervical pain (neck)  M54.2       3. Osteoarthritis of cervical spine, unspecified spinal osteoarthritis complication status  Y90.578                      Subjective: Patient reports that he started having vertigo early in the morning the day after therapy. He described symptoms as things moving right to left when looking at them, which lasted for about 1.5 days, regardless of position. He felt better when lying in a recliner most of the day. Now it seems fully resolved. He denies any other symptoms like visual disturbances, tinnitus etc. Patient reports that the neck pain has been a lot better in the morning, and he thinks it is from doing the snags. Objective: See treatment diary below    (-) vertebral artery test  (-) transverse ligament stress test  (-) alar ligament stress test    Assessment: Patient presented with negative upper cervical ligamentous integrity tests and negative vertebral artery test, as well as no provocation of vertigo symptoms during treatment today. Recommend patient notify physician if vertigo symptoms return. Patient also presented with no tenderness along left cervical paraspinals today and no pain during passive L cervical side bending, which is significant improvement compared to previous session. Patient is responding well to treatment so far and would benefit from continued skilled PT per plan of care to address impairments and improve function. Plan: Continue per plan of care. Progress mobility as tolerated. Precautions: HTN, h/o cancer.  EPOC 23      Manuals 10/9 10/11 10/16 10/19 10/24 10/31 11/2 11/7 11/9 11/14   Distraction/sub occipital release  CB gutierrez JP, JP, JP, JP   Upglides and side glides cervical spine gr 3-4 CB Byrnes ksg CB CB transverse L CB cleared transverse CB   STM L paraspinals  CB CB CB RN CB ksg CB IASTM b/l CB IASTM b/l CB L CB R   P-a mobs gr 3-4 upper Tspine CTJ   CB RN CB ksg CB   CB sitting   Neuro Re-Ed             Chin tuck with lift       x10 x10 Neck flexor endurance test x10   Chin tuck  10x3" 10x5" 10x5'' 2x10 2x10 x10 x10 x10 x10   cervical retraction 10" 5x10"        x10 ball   shld ext with retraction  10x rtb 2x10 gtb 2x10 GTB  2x10 GTB 2x10  GTB 2x10  GTB 2x10 gtb 2x10 gtb   Rows with retraction    2x10 GTB  2x10  GTB 2x10  GTB      Ther Ex             UT stretch 10" 5x10" ea 5x10" ea 5x10'' ea  5x10" ea 3x15" L only 3x15" L only 5x15" L only 5x15" L only   Doorway pec major stretch  5x10" ea 4x20"  4x20''  4x20" 3x30" 3x30" 3x30" 3x30"   Pt edu on plan of care, pathology, home program 8' 3' 2'     3' 3'    Cervical rotation AROM 10" 5x10" ea 5x10" ea 5x10'' ea 5x10" 5x10"  hold     Extension snags  10x 10x 10x 10x 10x       OA rotation   5x ea 10x ea 10 ea 10 ea  10 ea 10 ea hold     Seated thoracic ext with 1/2 foam roll      5"x15       Standing thoracic ext with arms overhead against wall      10" x10       Rotation snags         10x L 10x L                                                       Ther Activity                                       Gait Training                                       Modalities             Moist hot pack  8' 8'  8' start 8' start 8' start 8' start 8' start 8' start

## 2023-11-16 ENCOUNTER — OFFICE VISIT (OUTPATIENT)
Dept: PHYSICAL THERAPY | Facility: CLINIC | Age: 68
End: 2023-11-16
Payer: COMMERCIAL

## 2023-11-16 DIAGNOSIS — M54.2 CERVICAL PAIN (NECK): ICD-10-CM

## 2023-11-16 DIAGNOSIS — M54.2 CERVICALGIA: Primary | ICD-10-CM

## 2023-11-16 PROCEDURE — 97110 THERAPEUTIC EXERCISES: CPT | Performed by: PHYSICAL THERAPIST

## 2023-11-16 PROCEDURE — 97010 HOT OR COLD PACKS THERAPY: CPT | Performed by: PHYSICAL THERAPIST

## 2023-11-16 PROCEDURE — 97112 NEUROMUSCULAR REEDUCATION: CPT | Performed by: PHYSICAL THERAPIST

## 2023-11-16 PROCEDURE — 97140 MANUAL THERAPY 1/> REGIONS: CPT | Performed by: PHYSICAL THERAPIST

## 2023-11-16 NOTE — PROGRESS NOTES
Daily Note    Today's date: 2023  Patient name: Graeme Grewal  : 1955  MRN: [id number]  Referring provider: Beverley Hutchison MD  Dx:   Encounter Diagnosis     ICD-10-CM    1. Cervicalgia  M54.2       2. Cervical pain (neck)  M54.2                      Subjective: Patient reports no dizziness since last session. Objective: See treatment diary below    20 deg. R side bending    Assessment: Patient continues to be limited with cervical side bending AROM, but had less soft tissue tenderness on either side of paraspinals. Progressed cervical stabilization exercises with increase in sets with chin tuck and lifts. Patient is responding well to treatment so far and would benefit from continued skilled PT per plan of care to address impairments and improve function. Plan: Continue per plan of care. Progress mobility as tolerated. Precautions: HTN, h/o cancer.  EPOC 23      Manuals 11/16 10/11 10/16 10/19 10/24 10/31 11/2 11/7 11/9 11/14   Distraction/sub occipital release CB CB CB RN CB ksg CB CB CB CB   Upglides and side glides cervical spine gr 3-4 CB CB CB RN CB ksg CB CB transverse L CB cleared transverse CB   STM L paraspinals  CB CB CB RN CB ksg CB IASTM b/l CB IASTM b/l CB L CB R   P-a mobs gr 3-4 upper Tspine CTJ CB sitting  CB RN CB ksg CB   CB sitting   Neuro Re-Ed             Chin tuck with lift 2x10      x10 x10 Neck flexor endurance test x10   Chin tuck  10x3" 10x5" 10x5'' 2x10 2x10 x10 x10 x10 x10   cervical retraction 10" 5x10"        x10 ball   shld ext with retraction 2x10 gtb 10x rtb 2x10 gtb 2x10 GTB  2x10 GTB 2x10  GTB 2x10  GTB 2x10 gtb 2x10 gtb   Rows with retraction    2x10 GTB  2x10  GTB 2x10  GTB      Ther Ex             UT stretch 5x15" L only 5x10" ea 5x10" ea 5x10'' ea  5x10" ea 3x15" L only 3x15" L only 5x15" L only 5x15" L only   Doorway pec major stretch 3x30" 5x10" ea 4x20"  4x20''  4x20" 3x30" 3x30" 3x30" 3x30"   Pt edu on plan of care, pathology, home program 3' 3' 2'     3' 3'    Cervical rotation AROM  5x10" ea 5x10" ea 5x10'' ea 5x10" 5x10"  hold     Extension snags  10x 10x 10x 10x 10x       OA rotation   5x ea 10x ea 10 ea 10 ea  10 ea 10 ea hold                  Seated thoracic ext with 1/2 foam roll      5"x15       Standing thoracic ext with arms overhead against wall      10" x10       Rotation snags 10x L        10x L 10x L                                                       Ther Activity                                       Gait Training                                       Modalities             Moist hot pack  8' 8'  8' start 8' start 8' start 8' start 8' start 8' start

## 2023-11-20 DIAGNOSIS — I10 ESSENTIAL HYPERTENSION: ICD-10-CM

## 2023-11-20 RX ORDER — AMLODIPINE BESYLATE 5 MG/1
5 TABLET ORAL DAILY
Qty: 90 TABLET | Refills: 1 | Status: SHIPPED | OUTPATIENT
Start: 2023-11-20

## 2023-11-21 ENCOUNTER — OFFICE VISIT (OUTPATIENT)
Dept: PHYSICAL THERAPY | Facility: CLINIC | Age: 68
End: 2023-11-21
Payer: COMMERCIAL

## 2023-11-21 DIAGNOSIS — M54.2 CERVICAL PAIN (NECK): ICD-10-CM

## 2023-11-21 DIAGNOSIS — M47.812 OSTEOARTHRITIS OF CERVICAL SPINE, UNSPECIFIED SPINAL OSTEOARTHRITIS COMPLICATION STATUS: ICD-10-CM

## 2023-11-21 DIAGNOSIS — M54.2 CERVICALGIA: Primary | ICD-10-CM

## 2023-11-21 PROCEDURE — 97140 MANUAL THERAPY 1/> REGIONS: CPT | Performed by: PHYSICAL THERAPIST

## 2023-11-21 PROCEDURE — 97110 THERAPEUTIC EXERCISES: CPT | Performed by: PHYSICAL THERAPIST

## 2023-11-21 PROCEDURE — 97112 NEUROMUSCULAR REEDUCATION: CPT | Performed by: PHYSICAL THERAPIST

## 2023-11-21 NOTE — PROGRESS NOTES
Daily Note    Today's date: 2023  Patient name: Joleen Wadsworth  : 1955  MRN: [id number]  Referring provider: Paradise Sanchez MD  Dx:   Encounter Diagnosis     ICD-10-CM    1. Cervicalgia  M54.2       2. Cervical pain (neck)  M54.2       3. Osteoarthritis of cervical spine, unspecified spinal osteoarthritis complication status  M88.752                      Subjective: Patient reports still no dizziness lately. He reports that his neck pain often depends on how he falls asleep, but even when sleeping in awkward position, he gets only 2/10 pain with movement. Objective: See treatment diary below      Assessment: Patient had little to no soft tissue restrictions along cervical paraspinals so IASTM was held. He still has trigger points along UT however. Patient initially had pain with left upglides into rotation, but with slight flexion, pain subsided. Patient is responding well to treatment so far and would benefit from continued skilled PT per plan of care to address impairments and improve function. Plan: Continue per plan of care. Progress mobility as tolerated. Precautions: HTN, h/o cancer.  EPOC 23      Manuals 11/16 11/21 10/16 10/19 10/24 10/31 11/2 11/7 11/9 11/14   Distraction/sub occipital release CB CB CB RN CB ksg CB CB CB CB   Upglides and side glides cervical spine gr 3-4 CB CB CB RN CB ksg CB CB transverse L CB cleared transverse CB   STM L paraspinals  CB TRP UT CB CB RN CB ksg CB IASTM b/l CB IASTM b/l CB L CB R   P-a mobs gr 3-4 upper Tspine CTJ CB sitting CB sitting CB RN CB ksg CB   CB sitting   Neuro Re-Ed             Chin tuck with lift 2x10 2x10     x10 x10 Neck flexor endurance test x10   Chin tuck   10x5" 10x5'' 2x10 2x10 x10 x10 x10 x10   cervical retraction 10" 10x        x10 ball   shld ext with retraction 2x10 gtb 3x10 gtb 2x10 gtb 2x10 GTB  2x10 GTB 2x10  GTB 2x10  GTB 2x10 gtb 2x10 gtb   Rows with retraction    2x10 GTB  2x10  GTB 2x10  GTB Ther Ex             UT stretch 5x15" L only 5x10" L only 5x10" ea 5x10'' ea  5x10" ea 3x15" L only 3x15" L only 5x15" L only 5x15" L only   Doorway pec major stretch 3x30" 5x10" ea 4x20"  4x20''  4x20" 3x30" 3x30" 3x30" 3x30"   Pt edu on plan of care, pathology, home program 3' 3' 2'     3' 3'    Cervical rotation AROM   5x10" ea 5x10'' ea 5x10" 5x10"  hold     Extension snags   10x 10x 10x 10x       OA rotation   5x ea 10x ea 10 ea 10 ea  10 ea 10 ea hold                  Seated thoracic ext with 1/2 foam roll      5"x15                    Rotation snags 10x L 10x L       10x L 10x L                                                       Ther Activity                                       Gait Training                                       Modalities             Moist hot pack  8' 8'  8' start 8' start 8' start 8' start 8' start 8' start

## 2023-11-27 ENCOUNTER — OFFICE VISIT (OUTPATIENT)
Dept: PHYSICAL THERAPY | Facility: CLINIC | Age: 68
End: 2023-11-27
Payer: COMMERCIAL

## 2023-11-27 DIAGNOSIS — M47.812 OSTEOARTHRITIS OF CERVICAL SPINE, UNSPECIFIED SPINAL OSTEOARTHRITIS COMPLICATION STATUS: ICD-10-CM

## 2023-11-27 DIAGNOSIS — M54.2 CERVICAL PAIN (NECK): ICD-10-CM

## 2023-11-27 DIAGNOSIS — M54.2 CERVICALGIA: Primary | ICD-10-CM

## 2023-11-27 PROCEDURE — 97140 MANUAL THERAPY 1/> REGIONS: CPT

## 2023-11-27 PROCEDURE — 97110 THERAPEUTIC EXERCISES: CPT

## 2023-11-27 PROCEDURE — 97112 NEUROMUSCULAR REEDUCATION: CPT

## 2023-11-27 NOTE — PROGRESS NOTES
Daily Note    Today's date: 2023  Patient name: Radha Dumont  : 1955  MRN: [id number]  Referring provider: Keshav Hahn MD  Dx:   Encounter Diagnosis     ICD-10-CM    1. Cervicalgia  M54.2       2. Osteoarthritis of cervical spine, unspecified spinal osteoarthritis complication status  X06.439       3. Cervical pain (neck)  M54.2                      Subjective: Patient reports he continues with morning stiffness but overall feeling better. Objective: See treatment diary below      Assessment: Patient tolerated manual techniques without symptoms. Patient continues with mid thoracic and upper thoracic tightness with limited mobility. Reviewed use of cervical roll in pillow for sleeping postures. Patient will try at home. Patient is responding well to treatment so far and would benefit from continued skilled PT per plan of care to address impairments and improve function. Plan: Continue per plan of care. Progress mobility as tolerated. Precautions: HTN, h/o cancer.  EPOC 23      Manuals 11/16 11/21 11/27 10/19 10/24 10/31 11/2 11/7 11/9 11/14   Distraction/sub occipital release CB CB ksg RN CB ksg CB CB CB CB   Upglides and side glides cervical spine gr 3-4 CB CB ksg RN CB ksg CB CB transverse L CB cleared transverse CB   STM L paraspinals  CB TRP UT CB ksg RN CB ksg CB IASTM b/l CB IASTM b/l CB L CB R   P-a mobs gr 3-4 upper Tspine CTJ CB sitting CB sitting  RN CB ksg CB   CB sitting   Neuro Re-Ed             Chin tuck with lift 2x10 2x10 2x10    x10 x10 Neck flexor endurance test x10   Chin tuck    10x5'' 2x10 2x10 x10 x10 x10 x10   cervical retraction 10" 10x 10x       x10 ball   shld ext with retraction 2x10 gtb 3x10 gtb 3x10 gtb 2x10 GTB  2x10 GTB 2x10  GTB 2x10  GTB 2x10 gtb 2x10 gtb   Rows with retraction    2x10 GTB  2x10  GTB 2x10  GTB      Ther Ex             UT stretch 5x15" L only 5x10" L only 5x10" L only 5x10'' ea  5x10" ea 3x15" L only 3x15" L only 5x15" L only 5x15" L only   Doorway pec major stretch 3x30" 5x10" ea 5x10" 4x20''  4x20" 3x30" 3x30" 3x30" 3x30"   Pt edu on plan of care, pathology, home program 3' 3'      3' 3'    Cervical rotation AROM    5x10'' ea 5x10" 5x10"  hold     Extension snags    10x 10x 10x       OA rotation    10x ea 10 ea 10 ea  10 ea 10 ea hold                  Seated thoracic ext with 1/2 foam roll   x10   5"x15                    Rotation snags 10x L 10x L 10x L      10x L 10x L                                                       Ther Activity                                       Gait Training                                       Modalities             Moist hot pack  8' 8'  8' start 8' start 8' start 8' start 8' start 8' start

## 2023-11-30 ENCOUNTER — OFFICE VISIT (OUTPATIENT)
Dept: PHYSICAL THERAPY | Facility: CLINIC | Age: 68
End: 2023-11-30
Payer: COMMERCIAL

## 2023-11-30 DIAGNOSIS — M54.2 CERVICAL PAIN (NECK): ICD-10-CM

## 2023-11-30 DIAGNOSIS — M47.812 OSTEOARTHRITIS OF CERVICAL SPINE, UNSPECIFIED SPINAL OSTEOARTHRITIS COMPLICATION STATUS: ICD-10-CM

## 2023-11-30 DIAGNOSIS — M54.2 CERVICALGIA: Primary | ICD-10-CM

## 2023-11-30 PROCEDURE — 97110 THERAPEUTIC EXERCISES: CPT | Performed by: PHYSICAL THERAPIST

## 2023-11-30 PROCEDURE — 97140 MANUAL THERAPY 1/> REGIONS: CPT | Performed by: PHYSICAL THERAPIST

## 2023-11-30 PROCEDURE — 97010 HOT OR COLD PACKS THERAPY: CPT | Performed by: PHYSICAL THERAPIST

## 2023-11-30 PROCEDURE — 97112 NEUROMUSCULAR REEDUCATION: CPT | Performed by: PHYSICAL THERAPIST

## 2023-11-30 NOTE — PROGRESS NOTES
Daily Note    Today's date: 2023  Patient name: Graeme Grewal  : 1955  MRN: [id number]  Referring provider: Beverley Hutchison MD  Dx:   Encounter Diagnosis     ICD-10-CM    1. Cervicalgia  M54.2       2. Osteoarthritis of cervical spine, unspecified spinal osteoarthritis complication status  B83.174       3. Cervical pain (neck)  M54.2                      Subjective: Patient reports about 4/10 pain at worst over the past week. He thinks his neck has been a little more tense due to cold weather. He reports that he wants to hold off on towel snags today due to some recent nose bleeds and not wanting to get blood on towel. He denies any other new symptoms. Objective: See treatment diary below      Assessment: Patient noted slight improvement in pain with cervical rotation following manual therapy. Modified rotation stretching to just AROM vs snags. Patient required minor cuing to avoid upper trap overcompensation with shoulder extension. Patient is responding well to treatment so far and would benefit from continued skilled PT per plan of care to address impairments and improve function. Plan: Continue per plan of care. Progress mobility as tolerated. Precautions: HTN, h/o cancer.  EPOC 23      Manuals 11/16 11/21 11/27 11/30 10/24 10/31 11/2 11/7 11/9 11/14   Distraction/sub occipital release CB CB ksg CB CB ksg CB CB CB CB   Upglides and side glides cervical spine gr 3-4 CB CB ksg CB CB ksg CB CB transverse L CB cleared transverse CB   STM L paraspinals  CB TRP UT CB ksg TPR UT CB ksg CB IASTM b/l CB IASTM b/l CB L CB R   P-a mobs gr 3-4 upper Tspine CTJ CB sitting CB sitting  CB CB ksg CB   CB sitting   Neuro Re-Ed             Chin tuck with lift 2x10 2x10 2x10 2x10   x10 x10 Neck flexor endurance test x10   Chin tuck     2x10 2x10 x10 x10 x10 x10   cervical retraction 10" 10x 10x 10x      x10 ball   shld ext with retraction 2x10 gtb 3x10 gtb 3x10 gtb 3x10 gtb  2x10 GTB 2x10  GTB 2x10  GTB 2x10 gtb 2x10 gtb   Rows with retraction      2x10  GTB 2x10  GTB      Ther Ex             UT stretch 5x15" L only 5x10" L only 5x10" L only 5x10'' ea  5x10" ea 3x15" L only 3x15" L only 5x15" L only 5x15" L only   Doorway pec major stretch 3x30" 5x10" ea 5x10" 4x20''  4x20" 3x30" 3x30" 3x30" 3x30"   Pt edu on plan of care, pathology, home program 3' 3'      3' 3'    Cervical rotation AROM    10x 5x10" 5x10"  hold     Extension snags     10x 10x       OA rotation     10 ea 10 ea  10 ea 10 ea hold                  Seated thoracic ext with 1/2 foam roll   x10   5"x15                    Rotation snags 10x L 10x L 10x L held     10x L 10x L                                                       Ther Activity                                       Gait Training                                       Modalities             Moist hot pack  8' 8' 8' 8' start 8' start 8' start 8' start 8' start 8' start

## 2023-12-02 ENCOUNTER — HOSPITAL ENCOUNTER (EMERGENCY)
Facility: HOSPITAL | Age: 68
Discharge: HOME | End: 2023-12-02
Attending: EMERGENCY MEDICINE
Payer: COMMERCIAL

## 2023-12-02 VITALS
SYSTOLIC BLOOD PRESSURE: 122 MMHG | TEMPERATURE: 97.5 F | OXYGEN SATURATION: 96 % | HEART RATE: 53 BPM | RESPIRATION RATE: 16 BRPM | DIASTOLIC BLOOD PRESSURE: 68 MMHG

## 2023-12-02 DIAGNOSIS — R04.0 RIGHT-SIDED EPISTAXIS: Primary | ICD-10-CM

## 2023-12-02 DIAGNOSIS — R53.83 FATIGUE: ICD-10-CM

## 2023-12-02 DIAGNOSIS — E78.5 DYSLIPIDEMIA: ICD-10-CM

## 2023-12-02 LAB
ALBUMIN SERPL BCP-MCNC: 4.1 G/DL (ref 3.5–5)
ALP SERPL-CCNC: 43 U/L (ref 34–104)
ALT SERPL W P-5'-P-CCNC: 19 U/L (ref 7–52)
ANION GAP SERPL CALCULATED.3IONS-SCNC: 8 MMOL/L
AST SERPL W P-5'-P-CCNC: 20 U/L (ref 13–39)
BASOPHILS # BLD AUTO: 0.04 THOUSANDS/ÂΜL (ref 0–0.1)
BASOPHILS NFR BLD AUTO: 1 % (ref 0–1)
BILIRUB SERPL-MCNC: 0.83 MG/DL (ref 0.2–1)
BUN SERPL-MCNC: 14 MG/DL (ref 5–25)
CALCIUM SERPL-MCNC: 8.8 MG/DL (ref 8.4–10.2)
CHLORIDE SERPL-SCNC: 105 MMOL/L (ref 96–108)
CO2 SERPL-SCNC: 23 MMOL/L (ref 21–32)
CREAT SERPL-MCNC: 0.97 MG/DL (ref 0.6–1.3)
EOSINOPHIL # BLD AUTO: 0.04 THOUSAND/ÂΜL (ref 0–0.61)
EOSINOPHIL NFR BLD AUTO: 1 % (ref 0–6)
ERYTHROCYTE [DISTWIDTH] IN BLOOD BY AUTOMATED COUNT: 13.3 % (ref 11.6–15.1)
GFR SERPL CREATININE-BSD FRML MDRD: 78 ML/MIN/1.73SQ M
GLUCOSE SERPL-MCNC: 111 MG/DL (ref 65–140)
HCT VFR BLD AUTO: 49.1 % (ref 36.5–49.3)
HGB BLD-MCNC: 16.3 G/DL (ref 12–17)
IMM GRANULOCYTES # BLD AUTO: 0.02 THOUSAND/UL (ref 0–0.2)
IMM GRANULOCYTES NFR BLD AUTO: 0 % (ref 0–2)
LYMPHOCYTES # BLD AUTO: 1.21 THOUSANDS/ÂΜL (ref 0.6–4.47)
LYMPHOCYTES NFR BLD AUTO: 23 % (ref 14–44)
MCH RBC QN AUTO: 29.7 PG (ref 26.8–34.3)
MCHC RBC AUTO-ENTMCNC: 33.2 G/DL (ref 31.4–37.4)
MCV RBC AUTO: 89 FL (ref 82–98)
MONOCYTES # BLD AUTO: 0.29 THOUSAND/ÂΜL (ref 0.17–1.22)
MONOCYTES NFR BLD AUTO: 6 % (ref 4–12)
NEUTROPHILS # BLD AUTO: 3.68 THOUSANDS/ÂΜL (ref 1.85–7.62)
NEUTS SEG NFR BLD AUTO: 69 % (ref 43–75)
NRBC BLD AUTO-RTO: 0 /100 WBCS
PLATELET # BLD AUTO: 185 THOUSANDS/UL (ref 149–390)
PMV BLD AUTO: 10.1 FL (ref 8.9–12.7)
POTASSIUM SERPL-SCNC: 4.1 MMOL/L (ref 3.5–5.3)
PROT SERPL-MCNC: 6.9 G/DL (ref 6.4–8.4)
RBC # BLD AUTO: 5.49 MILLION/UL (ref 3.88–5.62)
SODIUM SERPL-SCNC: 136 MMOL/L (ref 135–147)
TSH SERPL DL<=0.05 MIU/L-ACNC: 2.13 UIU/ML (ref 0.45–4.5)
WBC # BLD AUTO: 5.28 THOUSAND/UL (ref 4.31–10.16)

## 2023-12-02 PROCEDURE — 84443 ASSAY THYROID STIM HORMONE: CPT | Performed by: PHYSICIAN ASSISTANT

## 2023-12-02 PROCEDURE — 99284 EMERGENCY DEPT VISIT MOD MDM: CPT | Performed by: EMERGENCY MEDICINE

## 2023-12-02 PROCEDURE — 99283 EMERGENCY DEPT VISIT LOW MDM: CPT

## 2023-12-02 PROCEDURE — 36415 COLL VENOUS BLD VENIPUNCTURE: CPT | Performed by: PHYSICIAN ASSISTANT

## 2023-12-02 PROCEDURE — 85025 COMPLETE CBC W/AUTO DIFF WBC: CPT | Performed by: PHYSICIAN ASSISTANT

## 2023-12-02 PROCEDURE — 80053 COMPREHEN METABOLIC PANEL: CPT | Performed by: PHYSICIAN ASSISTANT

## 2023-12-02 PROCEDURE — 30901 CONTROL OF NOSEBLEED: CPT | Performed by: EMERGENCY MEDICINE

## 2023-12-02 RX ORDER — OXYMETAZOLINE HYDROCHLORIDE 0.05 G/100ML
2 SPRAY NASAL ONCE
Status: COMPLETED | OUTPATIENT
Start: 2023-12-02 | End: 2023-12-02

## 2023-12-02 RX ORDER — ATORVASTATIN CALCIUM 20 MG/1
20 TABLET, FILM COATED ORAL DAILY
Qty: 90 TABLET | Refills: 0 | Status: SHIPPED | OUTPATIENT
Start: 2023-12-02

## 2023-12-02 RX ADMIN — SILVER NITRATE APPLICATORS 1 APPLICATOR: 25; 75 STICK TOPICAL at 09:03

## 2023-12-02 RX ADMIN — OXYMETAZOLINE HYDROCHLORIDE 2 SPRAY: 0.05 SPRAY NASAL at 08:40

## 2023-12-02 NOTE — ED PROVIDER NOTES
History  Chief Complaint   Patient presents with    Nose Bleed     Patient arrived to ER via POV c/o having been experiencing nose bleeds x 2 or 3 times a day for two weeks. Patient is a 76 yo wm with history of non hodgkins lymphoma, hyperlipidemia and HTN who reports 1 week history of atraumatic R sided epistaxis. States 2-3 nosebleeds per day lasting minutes only. No nose picking. Denies being on anticoagulants. Patient also reports 1-2 week history of increased fatigue. No fever, chills, cp, sob, abd pain, n/v/d, urinary symptoms. No other complaints         Prior to Admission Medications   Prescriptions Last Dose Informant Patient Reported? Taking? amLODIPine (NORVASC) 5 mg tablet   No No   Sig: TAKE 1 TABLET (5 MG TOTAL) BY MOUTH DAILY. atorvastatin (LIPITOR) 20 mg tablet   No No   Sig: TAKE 1 TABLET BY MOUTH EVERY DAY   losartan (COZAAR) 100 MG tablet   No No   Sig: Take 1 tablet (100 mg total) by mouth daily   methocarbamol (ROBAXIN) 500 mg tablet   No No   Sig: Take 1 tablet (500 mg total) by mouth daily at bedtime as needed for muscle spasms May make you drowsy or dizzy. Do not drive or operate machinery until you know how this medication affects you, as it may cause lethargy and mental cloudiness. Drive with caution. May cause blurred vision. Avoid alcohol/other drugs that make you sleepy.       Facility-Administered Medications: None       Past Medical History:   Diagnosis Date    Colon polyp     Hypertension 2016       Past Surgical History:   Procedure Laterality Date    COLONOSCOPY  01/18/2016    complete - serrted adenoma    CYST REMOVAL      DENTAL SURGERY  2017    Root canals due to cancer    INCISION AND DRAINAGE ABSCESS / HEMATOMA OF Henreitta Sachs / KNEE / THIGH  02/09/2015    rigjt upper, inner arm abscess    PORTACATH PLACEMENT  2011    And removed      IN ARTHRS KNE SURG W/MENISCECTOMY MED/LAT W/SHVG Left 11/19/2018    Procedure: ARTHROSCOPY KNEE PARTIAL  LATERAL MENISECTOMY;  Surgeon: Oralia Zuniga Analisa Bell MD;  Location: [facility] OR;  Service: Orthopedics    TONSILLECTOMY      As kid    VASECTOMY  1978       Family History   Problem Relation Age of Onset    Atrial fibrillation Mother     COPD Father         Deciced    Rheumatic fever Father     Glaucoma Father         Deciced    Alcohol abuse Father         Deciced    Thyroid disease Sister     Cancer Brother     Hypertension Son     Cancer Brother     Diverticulitis Son      I have reviewed and agree with the history as documented. E-Cigarette/Vaping    E-Cigarette Use Never User      E-Cigarette/Vaping Substances     Social History     Tobacco Use    Smoking status: Never    Smokeless tobacco: Never   Vaping Use    Vaping Use: Never used   Substance Use Topics    Alcohol use: Yes     Alcohol/week: 0.0 standard drinks of alcohol     Comment: 1 / year    Drug use: No       Review of Systems   Constitutional:  Positive for fatigue. Negative for chills and fever. HENT:  Positive for nosebleeds. Negative for ear pain and sore throat. Respiratory:  Negative for cough and shortness of breath. Cardiovascular:  Negative for chest pain and palpitations. Gastrointestinal:  Negative for abdominal pain, diarrhea, nausea and vomiting. Genitourinary:  Negative for dysuria and hematuria. Musculoskeletal:  Negative for arthralgias and back pain. Skin:  Negative for color change and rash. Neurological:  Negative for seizures, syncope, numbness and headaches. All other systems reviewed and are negative. Physical Exam  Physical Exam  Vitals and nursing note reviewed. Constitutional:       General: He is not in acute distress. Appearance: Normal appearance. He is not ill-appearing, toxic-appearing or diaphoretic. HENT:      Head: Normocephalic and atraumatic.       Right Ear: Tympanic membrane, ear canal and external ear normal.      Left Ear: Tympanic membrane, ear canal and external ear normal.      Nose:      Comments: No active bleeding from either nare on arrival to the ED  There is friable area with visible blood vessel on R septum that lightly oozed when provoked with cotton tipped applicator. Mouth/Throat:      Mouth: Mucous membranes are moist.      Pharynx: Oropharynx is clear. Eyes:      Extraocular Movements: Extraocular movements intact. Conjunctiva/sclera: Conjunctivae normal.      Pupils: Pupils are equal, round, and reactive to light. Cardiovascular:      Rate and Rhythm: Normal rate and regular rhythm. Pulses: Normal pulses. Heart sounds: Normal heart sounds. Pulmonary:      Effort: Pulmonary effort is normal.      Breath sounds: Normal breath sounds. Abdominal:      General: Abdomen is flat. Bowel sounds are normal.      Palpations: Abdomen is soft. Musculoskeletal:         General: Normal range of motion. Cervical back: Normal range of motion and neck supple. Skin:     General: Skin is warm and dry. Capillary Refill: Capillary refill takes less than 2 seconds. Neurological:      Mental Status: He is alert.          Vital Signs  ED Triage Vitals [12/02/23 0830]   Temperature Pulse Respirations Blood Pressure SpO2   97.5 °F (36.4 °C) 61 16 143/88 94 %      Temp src Heart Rate Source Patient Position - Orthostatic VS BP Location FiO2 (%)   -- Monitor Lying Left arm --      Pain Score       No Pain           Vitals:    12/02/23 0830 12/02/23 0900 12/02/23 0930   BP: 143/88 130/78 122/68   Pulse: 61 56 (!) 53   Patient Position - Orthostatic VS: Lying Sitting Sitting         Visual Acuity      ED Medications  Medications   oxymetazoline (AFRIN) 0.05 % nasal spray 2 spray (2 sprays Each Nare Given 12/2/23 0840)   silver nitrate-potassium nitrate (ARZOL SILVER NITRATE) 86-28 % applicator 1 applicator (1 applicator Topical Given 12/2/23 4100)       Diagnostic Studies  Results Reviewed       Procedure Component Value Units Date/Time    TSH, 3rd generation with Free T4 reflex [[id number]]  (Normal) Collected: 12/02/23 0854    Lab Status: Final result Specimen: Blood from Arm, Right Updated: 12/02/23 0930     TSH 3RD GENERATON 2.132 uIU/mL     Comprehensive metabolic panel [[id number]] Collected: 12/02/23 0854    Lab Status: Final result Specimen: Blood from Arm, Right Updated: 12/02/23 0913     Sodium 136 mmol/L      Potassium 4.1 mmol/L      Chloride 105 mmol/L      CO2 23 mmol/L      ANION GAP 8 mmol/L      BUN 14 mg/dL      Creatinine 0.97 mg/dL      Glucose 111 mg/dL      Calcium 8.8 mg/dL      AST 20 U/L      ALT 19 U/L      Alkaline Phosphatase 43 U/L      Total Protein 6.9 g/dL      Albumin 4.1 g/dL      Total Bilirubin 0.83 mg/dL      eGFR 78 ml/min/1.73sq m     Narrative:      [facility] guidelines for Chronic Kidney Disease (CKD):     Stage 1 with normal or high GFR (GFR > 90 mL/min/1.73 square meters)    Stage 2 Mild CKD (GFR = 60-89 mL/min/1.73 square meters)    Stage 3A Moderate CKD (GFR = 45-59 mL/min/1.73 square meters)    Stage 3B Moderate CKD (GFR = 30-44 mL/min/1.73 square meters)    Stage 4 Severe CKD (GFR = 15-29 mL/min/1.73 square meters)    Stage 5 End Stage CKD (GFR <15 mL/min/1.73 square meters)  Note: GFR calculation is accurate only with a steady state creatinine    CBC and differential [[id number]] Collected: 12/02/23 0854    Lab Status: Final result Specimen: Blood from Arm, Right Updated: 12/02/23 0900     WBC 5.28 Thousand/uL      RBC 5.49 Million/uL      Hemoglobin 16.3 g/dL      Hematocrit 49.1 %      MCV 89 fL      MCH 29.7 pg      MCHC 33.2 g/dL      RDW 13.3 %      MPV 10.1 fL      Platelets 409 Thousands/uL      nRBC 0 /100 WBCs      Neutrophils Relative 69 %      Immat GRANS % 0 %      Lymphocytes Relative 23 %      Monocytes Relative 6 %      Eosinophils Relative 1 %      Basophils Relative 1 %      Neutrophils Absolute 3.68 Thousands/µL      Immature Grans Absolute 0.02 Thousand/uL      Lymphocytes Absolute 1.21 Thousands/µL      Monocytes Absolute 0.29 Thousand/µL      Eosinophils Absolute 0.04 Thousand/µL      Basophils Absolute 0.04 Thousands/µL                    No orders to display              Procedures  Epistaxis management    Date/Time: 12/2/2023 9:09 AM    Performed by: Fabian Pierce PA-C  Authorized by: Fabian Pierce PA-C  Universal Protocol:  Consent: Verbal consent obtained. Risks and benefits: risks, benefits and alternatives were discussed  Consent given by: patient  Time out: Immediately prior to procedure a "time out" was called to verify the correct patient, procedure, equipment, support staff and site/side marked as required. Timeout called at: 12/2/2023 9:10 AM.  Patient understanding: patient states understanding of the procedure being performed  Patient consent: the patient's understanding of the procedure matches consent given  Procedure consent: procedure consent matches procedure scheduled  Relevant documents: relevant documents present and verified  Test results: test results available and properly labeled  Site marked: the operative site was marked  Radiology Images displayed and confirmed. If images not available, report reviewed: imaging studies available  Patient identity confirmed: verbally with patient and arm band    Patient location:  ED  Anesthesia (see MAR for exact dosages): Anesthesia method:  None  Procedure details:     Treatment site:  R septum and R anterior    Hemostasis method:  Silver nitrate    Spec Headlamp used: No      Treatment complexity:  Limited    Treatment episode: initial    Post-procedure details:     Assessment:  Bleeding stopped    Patient tolerance of procedure: Tolerated well, no immediate complications           ED Course                               SBIRT 22yo+      Flowsheet Row Most Recent Value   Initial Alcohol Screen: US AUDIT-C     1. How often do you have a drink containing alcohol? 0 Filed at: 12/02/2023 0839   2.  How many drinks containing alcohol do you have on a typical day you are drinking? 0 Filed at: 12/02/2023 0839   3a. Male UNDER 65: How often do you have five or more drinks on one occasion? 0 Filed at: 12/02/2023 0839   3b. FEMALE Any Age, or MALE 65+: How often do you have 4 or more drinks on one occassion? 0 Filed at: 12/02/2023 0839   Audit-C Score 0 Filed at: 12/02/2023 0829   [place]: How many times in the past year have you. .. Used an illegal drug or used a prescription medication for non-medical reasons? Never Filed at: 12/02/2023 6458                      Medical Decision Making  1 week history of R sided epistaxis. Couple areas oozed when provoked with cotton tipped applicator from R septum. Cotton ball with afrin placed in R nostril. Areas then cauterized with silver nitrate cautery. Pt also presenting with couple week history of fatigue. Differential diagnosis includes anemia, electrolyte abn, hypoglycemia, thyroid disturbance. Labs unremarkable. Pt is well appearing and not toxic. Normal physical exam. Pt advised to follow up with primary care provider - Dr Sita Holt- for further evaluation this week regarding fatigue. Epistasis instructions reviewed with patient   Return precautions given     Amount and/or Complexity of Data Reviewed  Labs: ordered. Risk  OTC drugs. Prescription drug management. Disposition  Final diagnoses:   Right-sided epistaxis   Fatigue     Time reflects when diagnosis was documented in both MDM as applicable and the Disposition within this note       Time User Action Codes Description Comment    12/2/2023  9:38 AM Patsy Aiken Add [R04.0] Right-sided epistaxis     12/2/2023  9:39 AM Patsy Aiken Add [R53.83] Fatigue           ED Disposition       ED Disposition   Discharge    Condition   Stable    Date/Time   Sat Dec 2, 2023 [address] discharge to home/self care.                    Follow-up Information       Follow up With Specialties Details Why Contact Info Additional Information    [facility]   [address] [address] [address] [phone] [address] [facility], [place], [address]            Discharge Medication List as of 12/2/2023  9:41 AM        CONTINUE these medications which have NOT CHANGED    Details   amLODIPine (NORVASC) 5 mg tablet TAKE 1 TABLET (5 MG TOTAL) BY MOUTH DAILY. , Starting Mon 11/20/2023, Normal      atorvastatin (LIPITOR) 20 mg tablet TAKE 1 TABLET BY MOUTH EVERY DAY, Starting Sat 12/2/2023, Normal      losartan (COZAAR) 100 MG tablet Take 1 tablet (100 mg total) by mouth daily, Starting Tue 9/5/2023, Normal      methocarbamol (ROBAXIN) 500 mg tablet Take 1 tablet (500 mg total) by mouth daily at bedtime as needed for muscle spasms May make you drowsy or dizzy. Do not drive or operate machinery until you know how this medication affects you, as it may cause lethargy and mental cloudiness. Drive with  caution. May cause blurred vision. Avoid alcohol/other drugs that make you sleepy. , Starting Fri 10/6/2023, Normal             No discharge procedures on file.     PDMP Review       None            ED Provider  Electronically Signed by             Alex Pritchard PA-C  12/02/23 9419

## 2023-12-02 NOTE — DISCHARGE INSTRUCTIONS
Humidify room air at home  No nose picking or blowing nose    Follow up with [facility]NT for persistent nosebleeds this week    If nosebleed starts again, hold sustained pressure on nostrils 10 minutes x2  If unable to control bleeding, return to ED

## 2023-12-05 ENCOUNTER — APPOINTMENT (OUTPATIENT)
Dept: PHYSICAL THERAPY | Facility: CLINIC | Age: 68
End: 2023-12-05
Payer: COMMERCIAL

## 2023-12-07 ENCOUNTER — EVALUATION (OUTPATIENT)
Dept: PHYSICAL THERAPY | Facility: CLINIC | Age: 68
End: 2023-12-07
Payer: COMMERCIAL

## 2023-12-07 DIAGNOSIS — M47.812 OSTEOARTHRITIS OF CERVICAL SPINE, UNSPECIFIED SPINAL OSTEOARTHRITIS COMPLICATION STATUS: ICD-10-CM

## 2023-12-07 DIAGNOSIS — M54.2 CERVICAL PAIN (NECK): ICD-10-CM

## 2023-12-07 DIAGNOSIS — M54.2 CERVICALGIA: Primary | ICD-10-CM

## 2023-12-07 PROCEDURE — 97140 MANUAL THERAPY 1/> REGIONS: CPT | Performed by: PHYSICAL THERAPIST

## 2023-12-07 PROCEDURE — 97110 THERAPEUTIC EXERCISES: CPT | Performed by: PHYSICAL THERAPIST

## 2023-12-07 PROCEDURE — 97010 HOT OR COLD PACKS THERAPY: CPT | Performed by: PHYSICAL THERAPIST

## 2023-12-07 PROCEDURE — 97112 NEUROMUSCULAR REEDUCATION: CPT | Performed by: PHYSICAL THERAPIST

## 2023-12-07 NOTE — PROGRESS NOTES
Discharge Summary/Progress Report    Today's date: 2023  Patient name: Ana Aguilar  : 1955  MRN: [id number]  Referring provider: Ita Diaz MD  Dx:   Encounter Diagnosis     ICD-10-CM    1. Cervicalgia  M54.2       2. Osteoarthritis of cervical spine, unspecified spinal osteoarthritis complication status  U90.939                        Subjective: Patient reports about 4/10 pain at worst over the past week. He still feels pain in response to cold weather. He isn't having as much pain in the morning. Patient in general feels significant improvement compared to start of care. He would like to transition to just home program at this time. Objective: See treatment diary below    Deep neck flexor endurance test: (-) able to hold >30 seconds    Cervical AROM  72 deg. R rotation  60 deg. L rotation  22 R side bending  18 L side bending    Moderate pec muscle tightness    Assessment: Patient demonstrates gradual improvements in cervical spine ROM. He also has met goals for deep neck flexor strength. He was able to tolerate upper trap stretch b/l without aggravating neck pain. At this time, patient is appropriate for discharge to home program to maintain improvements and further progress upon impairments. Educated patient on updated home program and discharge recommendations; he verbalized understanding to all education. Plan: Discharge to home program.       Precautions: HTN, h/o cancer.  EPOC 23      Manuals 11/16 11/21 11/27 11/30 12/7 10/31 11/2 11/7 11/9 11/14   Distraction/sub occipital release CB CB ksg CB CB ksg CB CB CB CB   Upglides and side glides cervical spine gr 3-4 CB CB ksg CB CB ksg CB CB transverse L CB cleared transverse CB   STM L paraspinals  CB TRP UT CB ksg TPR UT CB UT ksg CB IASTM b/l CB IASTM b/l CB L CB R   P-a mobs gr 3-4 upper Tspine CTJ CB sitting CB sitting  CB CB ksg CB   CB sitting   Neuro Re-Ed             Chin tuck with lift 2x10 2x10 2x10 2x10 2x10  x10 x10 Neck flexor endurance test x10   No money     2x10 gtb        cervical retraction 10" 10x 10x 10x      x10 ball   shld ext with retraction 2x10 gtb 3x10 gtb 3x10 gtb 3x10 gtb  2x10 GTB 2x10  GTB 2x10  GTB 2x10 gtb 2x10 gtb   Rows with retraction      2x10  GTB 2x10  GTB      Ther Ex             UT stretch 5x15" L only 5x10" L only 5x10" L only 5x10'' ea 5x10" ea 5x10" ea 3x15" L only 3x15" L only 5x15" L only 5x15" L only   Doorway pec major stretch 3x30" 5x10" ea 5x10" 4x20'' 3x30" 4x20" 3x30" 3x30" 3x30" 3x30"   Pt edu on plan of care, pathology, home program 3' 3'   5'   3' 3'    Cervical rotation AROM    10x  5x10"  hold     Extension snags      10x       OA rotation      10 ea  10 ea 10 ea hold                  Seated thoracic ext with 1/2 foam roll   x10   5"x15                    Rotation snags 10x L 10x L 10x L held 10x    10x L 10x L                                                       Ther Activity                                       Gait Training                                       Modalities             Moist hot pack  8' 8' 8' 8' start 8' start 8' start 8' start 8' start 8' start

## 2023-12-11 ENCOUNTER — TELEPHONE (OUTPATIENT)
Dept: FAMILY MEDICINE CLINIC | Facility: HOSPITAL | Age: 68
End: 2023-12-11

## 2023-12-11 NOTE — TELEPHONE ENCOUNTER
Patient is experiencing severe nose bleeds. Has gone to the ER to have is cauterized. But it is still happening. He has tried to get in w/ ENT but they are booked out. I scheduled him with us for tomorrow morning but he would like a call back to let him know if that appt is needed or if she would like him to see someone else?

## 2023-12-11 NOTE — TELEPHONE ENCOUNTER
GODWIN nml from ED - not anything I can do medically - needs to see ENT - can we call ENT and see if we can get him in sooner?

## 2023-12-14 DIAGNOSIS — I10 ESSENTIAL HYPERTENSION: ICD-10-CM

## 2023-12-14 RX ORDER — LOSARTAN POTASSIUM 100 MG/1
100 TABLET ORAL DAILY
Qty: 90 TABLET | Refills: 1 | Status: SHIPPED | OUTPATIENT
Start: 2023-12-14

## 2023-12-26 ENCOUNTER — OFFICE VISIT (OUTPATIENT)
Dept: FAMILY MEDICINE CLINIC | Facility: HOSPITAL | Age: 68
End: 2023-12-26
Payer: COMMERCIAL

## 2023-12-26 VITALS
TEMPERATURE: 97.9 F | DIASTOLIC BLOOD PRESSURE: 88 MMHG | HEIGHT: 68 IN | SYSTOLIC BLOOD PRESSURE: 144 MMHG | HEART RATE: 88 BPM | WEIGHT: 245 LBS | BODY MASS INDEX: 37.13 KG/M2

## 2023-12-26 DIAGNOSIS — R73.9 HYPERGLYCEMIA: ICD-10-CM

## 2023-12-26 DIAGNOSIS — R04.0 EPISTAXIS: ICD-10-CM

## 2023-12-26 DIAGNOSIS — E78.5 DYSLIPIDEMIA: ICD-10-CM

## 2023-12-26 DIAGNOSIS — M54.2 NECK PAIN: ICD-10-CM

## 2023-12-26 DIAGNOSIS — Z12.5 PROSTATE CANCER SCREENING: ICD-10-CM

## 2023-12-26 DIAGNOSIS — Z23 ENCOUNTER FOR IMMUNIZATION: ICD-10-CM

## 2023-12-26 DIAGNOSIS — I10 PRIMARY HYPERTENSION: Primary | ICD-10-CM

## 2023-12-26 PROCEDURE — G0008 ADMIN INFLUENZA VIRUS VAC: HCPCS | Performed by: INTERNAL MEDICINE

## 2023-12-26 PROCEDURE — 90662 IIV NO PRSV INCREASED AG IM: CPT | Performed by: INTERNAL MEDICINE

## 2023-12-26 PROCEDURE — 99214 OFFICE O/P EST MOD 30 MIN: CPT | Performed by: INTERNAL MEDICINE

## 2023-12-26 NOTE — ASSESSMENT & PLAN NOTE
Bp again elevated on presentation and not at goal by end of appt, was great by end of visit in ED, will con't current BP regimen for now as we are doing a short term f/u - urged low sodium diet/exercise/wgt loss, will follow

## 2023-12-26 NOTE — ASSESSMENT & PLAN NOTE
LDL annually - order given for Feb 24, con't current statin for now, healthy diet and regular exercise/healthy wgt encouraged

## 2023-12-26 NOTE — PROGRESS NOTES
Name: Eduard Ferguson      : 1955      MRN: [id number]  Encounter Provider: Judy Daniels DO  Encounter Date: 2023   Encounter department: [facility] [address]     Assessment & Plan     1. Primary hypertension  Assessment & Plan:  Bp again elevated on presentation and not at goal by end of appt, was great by end of visit in ED, will con't current BP regimen for now as we are doing a short term f/u - urged low sodium diet/exercise/wgt loss, will follow    Orders:  -     CBC and differential; Future; Expected date: 02/10/2024  -     Comprehensive metabolic panel; Future; Expected date: 02/10/2024  -     Hemoglobin A1C; Future; Expected date: 02/10/2024  -     Lipid panel; Future; Expected date: 02/10/2024  -     CBC and differential  -     Comprehensive metabolic panel  -     Hemoglobin A1C  -     Lipid panel    2. Neck pain  Comments:  Improved with PT, doing HEP and only needing OTC meds prn, call with relapse in symptoms  Orders:  -     CBC and differential; Future; Expected date: 02/10/2024  -     Comprehensive metabolic panel; Future; Expected date: 02/10/2024  -     Hemoglobin A1C; Future; Expected date: 02/10/2024  -     Lipid panel; Future; Expected date: 02/10/2024  -     CBC and differential  -     Comprehensive metabolic panel  -     Hemoglobin A1C  -     Lipid panel    3. Epistaxis  Comments:  Was seen in ED and then had f/u with ENT, s/p cauterization, call with recurrent bleeding, CBC/plt wnl  Orders:  -     CBC and differential; Future; Expected date: 02/10/2024  -     Comprehensive metabolic panel; Future; Expected date: 02/10/2024  -     Hemoglobin A1C; Future; Expected date: 02/10/2024  -     Lipid panel; Future; Expected date: 02/10/2024  -     CBC and differential  -     Comprehensive metabolic panel  -     Hemoglobin A1C  -     Lipid panel    4. Hyperglycemia  Assessment & Plan:  BW q 6 mo - order given for , diet/exercise/healthy wgt  encouraged    Orders:  -     CBC and differential; Future; Expected date: 02/10/2024  -     Comprehensive metabolic panel; Future; Expected date: 02/10/2024  -     Hemoglobin A1C; Future; Expected date: 02/10/2024  -     Lipid panel; Future; Expected date: 02/10/2024  -     CBC and differential  -     Comprehensive metabolic panel  -     Hemoglobin A1C  -     Lipid panel    5. Dyslipidemia  Assessment & Plan:  LDL annually - order given for Feb 24, con't current statin for now, healthy diet and regular exercise/healthy wgt encouraged    Orders:  -     CBC and differential; Future; Expected date: 02/10/2024  -     Comprehensive metabolic panel; Future; Expected date: 02/10/2024  -     Hemoglobin A1C; Future; Expected date: 02/10/2024  -     Lipid panel; Future; Expected date: 02/10/2024  -     CBC and differential  -     Comprehensive metabolic panel  -     Hemoglobin A1C  -     Lipid panel    6. Prostate cancer screening  -     PSA Total (Reflex To Free); Future; Expected date: 02/10/2024  -     PSA Total (Reflex To Free)       Colonoscopy 4/22 - 5 yrs    BW 8/23  PSA and FLP 2/23      Subjective      HPI Pt here for follow up appt    BP above goal today on presentation and meds were reviewed and no changes have occurred.  He denies missing doses of meds or SE with the meds.  He does not check his BP outside the office.  He notes no frequent Ha's/dizziness/double vision/CP.     Pt saw Ortho (Dr. Segal) in Oct for f/u neck pain - OV note reviewed.  He was referred to PT and has been doing the therapy w/benefit. Robaxin was sent for symptom control as well. He notes symptoms are much better and just bothersome in the am when he moves and then pain resolves.  He is not using muscle relaxer. He does not have to use any OTC meds for pain it is so minimal.  He states he is doing HEP.     Pt was seen in the ED 12/2/23 for nose bleed - ED note reviewed. TSH/CMP/CBC were all wnl. Bleeding was stopped with silver nitrate  "to R septum and R anterior vestibule.  Pt saw ENT (Dr. Chapman) on 12/12/23 for f/u of nose bleed - OV note reviewed. Vessels again cauterized with silver nitrate.  He was told to f/u prn.   He states he had 1 episode after ENT but has had none since then.           Review of Systems   Constitutional:  Negative for chills, fatigue and fever.   HENT:  Positive for nosebleeds. Negative for congestion.    Eyes:  Negative for pain and visual disturbance.   Respiratory:  Negative for cough and shortness of breath.    Cardiovascular:  Negative for chest pain, palpitations and leg swelling.   Gastrointestinal:  Negative for abdominal pain, blood in stool, constipation, diarrhea, nausea and vomiting.   Genitourinary:  Negative for difficulty urinating and dysuria.   Musculoskeletal:  Positive for neck pain. Negative for gait problem.   Skin:  Negative for rash and wound.   Neurological:  Negative for dizziness and headaches.   Hematological:  Does not bruise/bleed easily.   Psychiatric/Behavioral:  Negative for confusion.        Current Outpatient Medications on File Prior to Visit   Medication Sig    amLODIPine (NORVASC) 5 mg tablet TAKE 1 TABLET (5 MG TOTAL) BY MOUTH DAILY.    atorvastatin (LIPITOR) 20 mg tablet TAKE 1 TABLET BY MOUTH EVERY DAY    losartan (COZAAR) 100 MG tablet Take 1 tablet (100 mg total) by mouth daily    [DISCONTINUED] methocarbamol (ROBAXIN) 500 mg tablet Take 1 tablet (500 mg total) by mouth daily at bedtime as needed for muscle spasms May make you drowsy or dizzy. Do not drive or operate machinery until you know how this medication affects you, as it may cause lethargy and mental cloudiness. Drive with caution. May cause blurred vision. Avoid alcohol/other drugs that make you sleepy.       Objective     /88   Pulse 88   Temp 97.9 °F (36.6 °C) (Tympanic)   Ht 5' 8\" (1.727 m)   Wt 111 kg (245 lb)   BMI 37.25 kg/m²     Physical Exam  Constitutional:       General: He is not in acute " distress.     Appearance: He is well-developed. He is not ill-appearing.   HENT:      Head: Normocephalic and atraumatic.   Eyes:      General:         Right eye: No discharge.         Left eye: No discharge.      Conjunctiva/sclera: Conjunctivae normal.   Cardiovascular:      Rate and Rhythm: Normal rate and regular rhythm.      Heart sounds: No murmur heard.  Pulmonary:      Effort: Pulmonary effort is normal. No respiratory distress.      Breath sounds: Normal breath sounds. No wheezing, rhonchi or rales.   Musculoskeletal:         General: No deformity or signs of injury.   Lymphadenopathy:      Cervical: No cervical adenopathy.   Skin:     General: Skin is warm and dry.      Coloration: Skin is not pale.      Findings: No rash.   Neurological:      General: No focal deficit present.      Mental Status: He is alert. Mental status is at baseline.      Gait: Gait normal.   Psychiatric:         Mood and Affect: Mood normal.         Behavior: Behavior normal.         Thought Content: Thought content normal.         Judgment: Judgment normal.       Judy Daniels DO

## 2024-02-12 ENCOUNTER — OFFICE VISIT (OUTPATIENT)
Dept: FAMILY MEDICINE CLINIC | Facility: HOSPITAL | Age: 69
End: 2024-02-12
Payer: COMMERCIAL

## 2024-02-12 VITALS
TEMPERATURE: 97.6 F | DIASTOLIC BLOOD PRESSURE: 78 MMHG | OXYGEN SATURATION: 97 % | SYSTOLIC BLOOD PRESSURE: 132 MMHG | BODY MASS INDEX: 37.28 KG/M2 | HEIGHT: 68 IN | HEART RATE: 72 BPM | WEIGHT: 246 LBS

## 2024-02-12 DIAGNOSIS — E66.01 SEVERE OBESITY (BMI 35.0-39.9) WITH COMORBIDITY (HCC): ICD-10-CM

## 2024-02-12 DIAGNOSIS — J06.9 VIRAL URI WITH COUGH: Primary | ICD-10-CM

## 2024-02-12 PROCEDURE — 99214 OFFICE O/P EST MOD 30 MIN: CPT | Performed by: NURSE PRACTITIONER

## 2024-02-12 RX ORDER — PREDNISONE 10 MG/1
TABLET ORAL
Qty: 32 TABLET | Refills: 0 | Status: SHIPPED | OUTPATIENT
Start: 2024-02-12

## 2024-02-12 NOTE — PROGRESS NOTES
Assessment/Plan:     Viral URI with lingering cough. I do feel his airway is reactive.   Start prednisone.   Call if no better or any worsening.     Diagnoses and all orders for this visit:    Viral URI with cough  -     predniSONE 10 mg tablet; 4 tabs x 3 days, 3 tabs x 3 days 2 tabs x 3 days 1 tab x 3 days, 1/2 tab x 3 days then stop    Severe obesity (BMI 35.0-39.9) with comorbidity (HCC)  Comments:  Managed by PCP          Subjective:     Patient ID: Eduard Ferguson is a 68 y.o. male.    Cough for 3 weeks. No fever. COVID negative several times. Cough worse at night. Has some wheezing at night. No sob. No asthma history. Has nasal congestion and runny nose. No HA or sore throat. Has been taking Coricidin, fast max. Not helpful. Has been using Flonase. No sinus pain or pressure.     Cough  This is a new problem. The current episode started 1 to 4 weeks ago. The problem occurs hourly. The cough is Productive of sputum. Associated symptoms include nasal congestion, postnasal drip, rhinorrhea and wheezing. Pertinent negatives include no chest pain, chills, ear pain, fever, headaches, heartburn, hemoptysis, myalgias, rash, sore throat, shortness of breath, sweats or weight loss. The symptoms are aggravated by cold air.       Review of Systems   Constitutional:  Negative for chills, fever and weight loss.   HENT:  Positive for congestion, postnasal drip and rhinorrhea. Negative for ear pain, sinus pressure, sinus pain and sore throat.    Respiratory:  Positive for cough and wheezing. Negative for hemoptysis and shortness of breath.    Cardiovascular:  Negative for chest pain.   Gastrointestinal:  Negative for heartburn.   Musculoskeletal:  Negative for myalgias.   Skin:  Negative for rash.   Neurological:  Negative for headaches.         The following portions of the patient's history were reviewed and updated as appropriate: allergies, current medications, past family history, past medical history, past social  history, past surgical history and problem list.    Objective:  Vitals:    02/12/24 0921   BP: 132/78   Pulse: 72   Temp: 97.6 °F (36.4 °C)   SpO2: 97%      Physical Exam  Vitals reviewed.   Constitutional:       General: He is not in acute distress.     Appearance: Normal appearance. He is not ill-appearing.   HENT:      Right Ear: Tympanic membrane, ear canal and external ear normal.      Left Ear: Tympanic membrane, ear canal and external ear normal.      Mouth/Throat:      Mouth: Mucous membranes are moist.      Pharynx: Oropharynx is clear.      Comments: Post nasal drip noted  Cardiovascular:      Rate and Rhythm: Normal rate and regular rhythm.      Heart sounds: Normal heart sounds. No murmur heard.  Pulmonary:      Effort: Pulmonary effort is normal.      Breath sounds: Examination of the left-lower field reveals rhonchi. Rhonchi present. No wheezing or rales.      Comments: Dry cough noted  Lymphadenopathy:      Cervical: Cervical adenopathy present.   Skin:     General: Skin is warm and dry.   Neurological:      Mental Status: He is alert and oriented to person, place, and time.   Psychiatric:         Mood and Affect: Mood normal.         Behavior: Behavior normal.         Thought Content: Thought content normal.         Judgment: Judgment normal.

## 2024-02-22 LAB
ALBUMIN SERPL-MCNC: 4.5 G/DL (ref 3.9–4.9)
ALBUMIN/GLOB SERPL: 2 {RATIO} (ref 1.2–2.2)
ALP SERPL-CCNC: 54 IU/L (ref 44–121)
ALT SERPL-CCNC: 24 IU/L (ref 0–44)
AST SERPL-CCNC: 22 IU/L (ref 0–40)
BASOPHILS # BLD AUTO: 0 X10E3/UL (ref 0–0.2)
BASOPHILS NFR BLD AUTO: 0 %
BILIRUB SERPL-MCNC: 1 MG/DL (ref 0–1.2)
BUN SERPL-MCNC: 20 MG/DL (ref 8–27)
BUN/CREAT SERPL: 19 (ref 10–24)
CALCIUM SERPL-MCNC: 9.1 MG/DL (ref 8.6–10.2)
CHLORIDE SERPL-SCNC: 100 MMOL/L (ref 96–106)
CHOLEST SERPL-MCNC: 184 MG/DL (ref 100–199)
CHOLEST/HDLC SERPL: 2.9 RATIO (ref 0–5)
CO2 SERPL-SCNC: 26 MMOL/L (ref 20–29)
CREAT SERPL-MCNC: 1.08 MG/DL (ref 0.76–1.27)
EGFR: 74 ML/MIN/1.73
EOSINOPHIL # BLD AUTO: 0.1 X10E3/UL (ref 0–0.4)
EOSINOPHIL NFR BLD AUTO: 1 %
ERYTHROCYTE [DISTWIDTH] IN BLOOD BY AUTOMATED COUNT: 13.6 % (ref 11.6–15.4)
EST. AVERAGE GLUCOSE BLD GHB EST-MCNC: 117 MG/DL
GLOBULIN SER-MCNC: 2.2 G/DL (ref 1.5–4.5)
GLUCOSE SERPL-MCNC: 90 MG/DL (ref 70–99)
HBA1C MFR BLD: 5.7 % (ref 4.8–5.6)
HCT VFR BLD AUTO: 48.3 % (ref 37.5–51)
HDLC SERPL-MCNC: 63 MG/DL
HGB BLD-MCNC: 16.6 G/DL (ref 13–17.7)
IMM GRANULOCYTES # BLD: 0.1 X10E3/UL (ref 0–0.1)
IMM GRANULOCYTES NFR BLD: 1 %
LDLC SERPL CALC-MCNC: 104 MG/DL (ref 0–99)
LYMPHOCYTES # BLD AUTO: 3 X10E3/UL (ref 0.7–3.1)
LYMPHOCYTES NFR BLD AUTO: 25 %
MCH RBC QN AUTO: 31 PG (ref 26.6–33)
MCHC RBC AUTO-ENTMCNC: 34.4 G/DL (ref 31.5–35.7)
MCV RBC AUTO: 90 FL (ref 79–97)
MONOCYTES # BLD AUTO: 0.9 X10E3/UL (ref 0.1–0.9)
MONOCYTES NFR BLD AUTO: 7 %
NEUTROPHILS # BLD AUTO: 8.3 X10E3/UL (ref 1.4–7)
NEUTROPHILS NFR BLD AUTO: 66 %
PLATELET # BLD AUTO: 203 X10E3/UL (ref 150–450)
POTASSIUM SERPL-SCNC: 4.2 MMOL/L (ref 3.5–5.2)
PROT SERPL-MCNC: 6.7 G/DL (ref 6–8.5)
PSA SERPL-MCNC: 0.5 NG/ML (ref 0–4)
RBC # BLD AUTO: 5.36 X10E6/UL (ref 4.14–5.8)
SL AMB REFLEX CRITERIA: NORMAL
SL AMB VLDL CHOLESTEROL CALC: 17 MG/DL (ref 5–40)
SODIUM SERPL-SCNC: 141 MMOL/L (ref 134–144)
TRIGL SERPL-MCNC: 96 MG/DL (ref 0–149)
WBC # BLD AUTO: 12.4 X10E3/UL (ref 3.4–10.8)

## 2024-02-26 ENCOUNTER — OFFICE VISIT (OUTPATIENT)
Dept: FAMILY MEDICINE CLINIC | Facility: HOSPITAL | Age: 69
End: 2024-02-26
Payer: COMMERCIAL

## 2024-02-26 VITALS
BODY MASS INDEX: 36.83 KG/M2 | WEIGHT: 243 LBS | HEART RATE: 95 BPM | OXYGEN SATURATION: 95 % | HEIGHT: 68 IN | DIASTOLIC BLOOD PRESSURE: 82 MMHG | SYSTOLIC BLOOD PRESSURE: 138 MMHG

## 2024-02-26 DIAGNOSIS — Z00.00 MEDICARE ANNUAL WELLNESS VISIT, SUBSEQUENT: ICD-10-CM

## 2024-02-26 DIAGNOSIS — E66.01 SEVERE OBESITY (BMI 35.0-39.9) WITH COMORBIDITY (HCC): ICD-10-CM

## 2024-02-26 DIAGNOSIS — I10 PRIMARY HYPERTENSION: ICD-10-CM

## 2024-02-26 DIAGNOSIS — R73.9 HYPERGLYCEMIA: Primary | ICD-10-CM

## 2024-02-26 DIAGNOSIS — E78.5 DYSLIPIDEMIA: ICD-10-CM

## 2024-02-26 DIAGNOSIS — I77.811 ABDOMINAL AORTIC ECTASIA (HCC): ICD-10-CM

## 2024-02-26 DIAGNOSIS — R05.2 SUBACUTE COUGH: ICD-10-CM

## 2024-02-26 PROCEDURE — 99214 OFFICE O/P EST MOD 30 MIN: CPT | Performed by: INTERNAL MEDICINE

## 2024-02-26 PROCEDURE — G0439 PPPS, SUBSEQ VISIT: HCPCS | Performed by: INTERNAL MEDICINE

## 2024-02-26 RX ORDER — BUDESONIDE AND FORMOTEROL FUMARATE DIHYDRATE 80; 4.5 UG/1; UG/1
2 AEROSOL RESPIRATORY (INHALATION) 2 TIMES DAILY
Qty: 10.2 G | Refills: 1 | Status: SHIPPED | OUTPATIENT
Start: 2024-02-26

## 2024-02-26 RX ORDER — ATORVASTATIN CALCIUM 40 MG/1
40 TABLET, FILM COATED ORAL DAILY
Qty: 90 TABLET | Refills: 2 | Status: SHIPPED | OUTPATIENT
Start: 2024-02-26

## 2024-02-26 RX ORDER — DOXYCYCLINE HYCLATE 100 MG
100 TABLET ORAL 2 TIMES DAILY
Qty: 14 TABLET | Refills: 0 | Status: SHIPPED | OUTPATIENT
Start: 2024-02-26 | End: 2024-03-04

## 2024-02-26 NOTE — PROGRESS NOTES
Assessment and Plan:     Problem List Items Addressed This Visit          Cardiovascular and Mediastinum    Hypertension     BP better today, con't current meds, encouraged healthy diet and regular exercise, will follow         Relevant Orders    CBC and differential    Comprehensive metabolic panel    Hemoglobin A1C    Lipid panel    TSH, 3rd generation with Free T4 reflex    Abdominal aortic ectasia (HCC)     Ectasia noted on PET CT - will check in Sept 24 with US and monitor            Other    Dyslipidemia     LDL still above goal, increase Atorvastatin from 20 mg to 40 mg, healthy diet/regular exercise encouraged, recheck FLP in 6 mos -BW order given, will follow         Relevant Medications    atorvastatin (LIPITOR) 40 mg tablet    Other Relevant Orders    CBC and differential    Comprehensive metabolic panel    Hemoglobin A1C    Lipid panel    TSH, 3rd generation with Free T4 reflex    Severe obesity (BMI 35.0-39.9) with comorbidity (HCC)     Healthy diet and regular exercise encouraged         Relevant Orders    CBC and differential    Comprehensive metabolic panel    Hemoglobin A1C    Lipid panel    TSH, 3rd generation with Free T4 reflex    Hyperglycemia - Primary     FBS wnl but A1c just in IFG range, urged healthy diet and regular exercise, will recheck in 6 mos - BW order given, will follow         Relevant Orders    CBC and differential    Comprehensive metabolic panel    Hemoglobin A1C    Lipid panel    TSH, 3rd generation with Free T4 reflex     Other Visit Diagnoses       Subacute cough        NO  lasting benefit with PO Prednisone, will give one time dose of Doxy d/t persistent symptoms/R TM erythema, trial of Symbicort (SE reviewed) and start with CXR, call if no better a all in 2 wks and would then need PFT's/CT chest and further eval, call with F/C/increase in SOB    Relevant Medications    doxycycline hyclate (VIBRA-TABS) 100 mg tablet    budesonide-formoterol (Symbicort) 80-4.5 MCG/ACT  inhaler    Other Relevant Orders    XR chest pa & lateral    Medicare annual wellness visit, subsequent        BMI 36.0-36.9,adult                Depression Screening and Follow-up Plan: Patient was screened for depression during today's encounter. They screened negative with a PHQ-2 score of 0.      Preventive health issues were discussed with patient, and age appropriate screening tests were ordered as noted in patient's After Visit Summary.  Personalized health advice and appropriate referrals for health education or preventive services given if needed, as noted in patient's After Visit Summary.     History of Present Illness:     Patient presents for a follow up appt/BW results and Medicare Wellness Visit    Cough  Associated symptoms include shortness of breath and wheezing. Pertinent negatives include no chest pain, chills, ear pain, eye redness, fever, headaches, myalgias, rash or sore throat.    BW results were d/w pt in detail: FBS/A1C 90/5.7, WBC 12.4 (with increase in abs neutrophils), rest of CMP/CBC/PSA were wnl, FLP with  but TC/TG/HDL all at goal    Def of nml vs IFG vs DM was d/w pt in detail. Diet/exercise was reviewed - wgt up 6 lbs from Aug 23. He admits diet has been poor and he has not been as active with current illness.     Goal FLP was d/w pt in detail.  Diet/exercise reviewed as noted above.  He is taking his statin daily as directed w/o Se.  He notes no stroke/TIA symptoms/CP.     BP better today - SBP upper end of nml but acceptable.  He is taking his Losartan and Amlodipine daily w/o SE.  He denies frequently missing doses/forgetting to take Rx.  He notes no frequent HA's/dizziness/double vision/CP.    Pt saw Renée 2/12/24 with c/o cough x 3 wks - see OV note for details (now 5-6 wks).  Cough started after a URI - seemed to get better for a week or so but the last week it has been.  He was given Prednisone taper with benefit.  He notes some wheezing when he lays down and has some  SOB with long distances/stairs.  He notes no F/C but has some dizziness with coughing. He notes no congestion runny nose/ear pain/ST/N/V/D.  He is currently not using anything for the cough.  He did use Mucinex and then Delsym and then Coricidin w/o great benefit.       Colonoscopy 4/22 - 5 yrs    BW 2/24    Abd aortic ectasia - do US in Sept - will order at next appt      Patient Care Team:  Judy Daniels DO as PCP - General  Cb Bhandari MD (Dermatology)  Iggy Palacio MD (Ophthalmology)     Review of Systems:     Review of Systems   Constitutional:  Positive for fatigue. Negative for chills and fever.   HENT:  Negative for congestion, ear pain and sore throat.    Eyes:  Negative for pain and redness.   Respiratory:  Positive for cough, chest tightness, shortness of breath and wheezing.    Cardiovascular:  Negative for chest pain, palpitations and leg swelling.   Gastrointestinal:  Negative for abdominal pain, blood in stool, constipation, diarrhea, nausea and vomiting.   Genitourinary:  Negative for difficulty urinating and dysuria.   Musculoskeletal:  Negative for arthralgias and myalgias.   Skin:  Negative for rash and wound.   Neurological:  Positive for dizziness. Negative for headaches.   Hematological:  Does not bruise/bleed easily.   Psychiatric/Behavioral:  Positive for dysphoric mood. Negative for confusion.         Problem List:     Patient Active Problem List   Diagnosis    Apnea    Dyslipidemia    Hypertension    History of lymphoma    Macular degeneration    Severe obesity (BMI 35.0-39.9) with comorbidity (HCC)    Serrated adenoma of colon    Umbilical hernia    Primary osteoarthritis of both knees    Hyperglycemia    Chronic pain of both knees    Colon polyp    Central serous chorioretinopathy of both eyes    Abdominal aortic ectasia (HCC)      Past Medical and Surgical History:     Past Medical History:   Diagnosis Date    Colon polyp     Hypertension 2016     Past Surgical History:    Procedure Laterality Date    COLONOSCOPY  01/18/2016    complete - serrted adenoma    CYST REMOVAL      DENTAL SURGERY  2017    Root canals due to cancer    INCISION AND DRAINAGE ABSCESS / HEMATOMA OF BURSA / KNEE / THIGH  02/09/2015    rigjt upper, inner arm abscess    PORTACATH PLACEMENT  2011    And removed      [place] ARTHRS KNE SURG W/MENISCECTOMY MED/LAT W/SHVG Left 11/19/2018    Procedure: ARTHROSCOPY KNEE PARTIAL  LATERAL MENISECTOMY;  Surgeon: Nikki Burgos MD;  Location:  MAIN OR;  Service: Orthopedics    TONSILLECTOMY      As kid    VASECTOMY  1978      Family History:     Family History   Problem Relation Age of Onset    Atrial fibrillation Mother     COPD Father         Deciced    Rheumatic fever Father     Glaucoma Father         Deciced    Alcohol abuse Father         Deciced    Thyroid disease Sister     Cancer Brother     Hypertension Son     Cancer Brother     Diverticulitis Son       Social History:     Social History     Socioeconomic History    Marital status: /Civil Union     Spouse name: None    Number of children: None    Years of education: None    Highest education level: None   Occupational History     Comment: full time employment    Tobacco Use    Smoking status: Never    Smokeless tobacco: Never   Vaping Use    Vaping status: Never Used   Substance and Sexual Activity    Alcohol use: Yes     Alcohol/week: 0.0 standard drinks of alcohol     Comment: 1 / year    Drug use: No    Sexual activity: Never     Comment: Resides with Sukhwinder (Wife)   Other Topics Concern    None   Social History Narrative         Social Determinants of Health     Financial Resource Strain: Low Risk  (2/26/2024)    Overall Financial Resource Strain (CARDIA)     Difficulty of Paying Living Expenses: Not hard at all   Food Insecurity: Not on file   Transportation Needs: No Transportation Needs (2/26/2024)    PRAPARE - Transportation     Lack of Transportation (Medical): No     Lack of Transportation  (Non-Medical): No   Physical Activity: Not on file   Stress: Not on file   Social Connections: Not on file   Intimate Partner Violence: Not on file   Housing Stability: Not on file      Medications and Allergies:     Current Outpatient Medications   Medication Sig Dispense Refill    amLODIPine (NORVASC) 5 mg tablet TAKE 1 TABLET (5 MG TOTAL) BY MOUTH DAILY. 90 tablet 1    atorvastatin (LIPITOR) 40 mg tablet Take 1 tablet (40 mg total) by mouth daily 90 tablet 2    budesonide-formoterol (Symbicort) 80-4.5 MCG/ACT inhaler Inhale 2 puffs 2 (two) times a day Rinse mouth after use. 10.2 g 1    doxycycline hyclate (VIBRA-TABS) 100 mg tablet Take 1 tablet (100 mg total) by mouth 2 (two) times a day for 7 days 14 tablet 0    losartan (COZAAR) 100 MG tablet Take 1 tablet (100 mg total) by mouth daily 90 tablet 1     No current facility-administered medications for this visit.     Allergies   Allergen Reactions    Lisinopril Cough      Immunizations:     Immunization History   Administered Date(s) Administered    COVID-19 PFIZER VACCINE 0.3 ML IM 03/09/2021, 03/29/2021, 12/06/2021    INFLUENZA 10/03/2018, 12/16/2019, 10/09/2020, 10/15/2021, 10/15/2021, 10/04/2022, 12/26/2023    Influenza, high dose seasonal 0.7 mL 12/16/2019, 10/09/2020, 12/26/2023    Influenza, injectable, quadrivalent, preservative free 0.5 mL 10/03/2018    Pneumococcal Conjugate 13-Valent 12/16/2019, 12/16/2019    Pneumococcal Polysaccharide PPV23 01/12/2021, 01/12/2021    Tdap 01/22/2016, 01/22/2016, 05/07/2016      Health Maintenance:         Topic Date Due    Colorectal Cancer Screening  04/03/2027    Hepatitis C Screening  Completed         Topic Date Due    COVID-19 Vaccine (4 - 2023-24 season) 09/01/2023      Medicare Screening Tests and Risk Assessments:     Eduard is here for his Subsequent Wellness visit. Last Medicare Wellness visit information reviewed, patient interviewed and updates made to the record today.      Health Risk Assessment:    Patient rates overall health as good. Patient feels that their physical health rating is slightly worse. Patient is satisfied with their life. Eyesight was rated as same. Hearing was rated as same. Patient feels that their emotional and mental health rating is same. Patients states they are never, rarely angry. Patient states they are sometimes unusually tired/fatigued. Pain experienced in the last 7 days has been some. Patient's pain rating has been 4/10. Patient states that he has experienced weight loss or gain in last 6 months. Physical health worse with current persistent cough    Depression Screening:   PHQ-2 Score: 0      Fall Risk Screening:   In the past year, patient has experienced: no history of falling in past year      Home Safety:  Patient has trouble with stairs inside or outside of their home. Patient has working smoke alarms and has working carbon monoxide detector. Home safety hazards include: none.     Nutrition:   Current diet is Frequent junk food.     Medications:   Patient is currently taking over-the-counter supplements. OTC medications include: see medication list. Patient is able to manage medications.     Activities of Daily Living (ADLs)/Instrumental Activities of Daily Living (IADLs):   Walk and transfer into and out of bed and chair?: Yes  Dress and groom yourself?: Yes    Bathe or shower yourself?: Yes    Feed yourself? Yes  Do your laundry/housekeeping?: Yes  Manage your money, pay your bills and track your expenses?: Yes  Make your own meals?: Yes    Do your own shopping?: Yes    Previous Hospitalizations:   Any hospitalizations or ED visits within the last 12 months?: Yes    How many hospitalizations have you had in the last year?: 1-2    Advance Care Planning:   Living will: Yes    Durable POA for healthcare: Yes    Advanced directive: Yes      Cognitive Screening:   Provider or family/friend/caregiver concerned regarding cognition?: No    PREVENTIVE SCREENINGS       "Cardiovascular Screening:    General: Screening Current, Risks and Benefits Discussed and History Lipid Disorder      Diabetes Screening:     General: Screening Current and Risks and Benefits Discussed      Colorectal Cancer Screening:     General: Screening Current      Prostate Cancer Screening:    General: Screening Current and Risks and Benefits Discussed      Osteoporosis Screening:    General: Risks and Benefits Discussed and Screening Not Indicated      Abdominal Aortic Aneurysm (AAA) Screening:    Risk factors include: age between 65-74 yo        General: Risks and Benefits Discussed and Screening Not Indicated      Lung Cancer Screening:     General: Screening Not Indicated and Risks and Benefits Discussed      Hepatitis C Screening:    General: Screening Current and Risks and Benefits Discussed    Screening, Brief Intervention, and Referral to Treatment (SBIRT)    Screening  Typical number of drinks in a day: 0  Typical number of drinks in a week: 0  Interpretation: Low risk drinking behavior.    AUDIT-C Screenin) How often did you have a drink containing alcohol in the past year? monthly or less  2) How many drinks did you have on a typical day when you were drinking in the past year? 1 to 2  3) How often did you have 6 or more drinks on one occasion in the past year? never    AUDIT-C Score: 1  Interpretation: Score 0-3 (male): Negative screen for alcohol misuse    Single Item Drug Screening:  How often have you used an illegal drug (including marijuana) or a prescription medication for non-medical reasons in the past year? never    Single Item Drug Screen Score: 0  Interpretation: Negative screen for possible drug use disorder    No results found.     Physical Exam:     /82   Pulse 95   Ht 5' 8\" (1.727 m)   Wt 110 kg (243 lb)   SpO2 95%   BMI 36.95 kg/m²     Physical Exam  Vitals and nursing note reviewed.   Constitutional:       General: He is not in acute distress.     Appearance: He is " well-developed. He is not ill-appearing.   HENT:      Head: Normocephalic and atraumatic.      Right Ear: External ear normal. There is no impacted cerumen.      Left Ear: Tympanic membrane and external ear normal. There is no impacted cerumen.      Ears:      Comments: R TM erythematous  Eyes:      General:         Right eye: No discharge.         Left eye: No discharge.      Conjunctiva/sclera: Conjunctivae normal.   Neck:      Vascular: No carotid bruit.      Trachea: No tracheal deviation.   Cardiovascular:      Rate and Rhythm: Normal rate and regular rhythm.      Heart sounds: Normal heart sounds. No murmur heard.  Pulmonary:      Effort: Pulmonary effort is normal. No respiratory distress.      Breath sounds: Normal breath sounds. No wheezing, rhonchi or rales.   Abdominal:      General: There is no distension.      Palpations: Abdomen is soft.      Tenderness: There is no abdominal tenderness. There is no guarding or rebound.   Musculoskeletal:         General: No deformity or signs of injury.      Cervical back: Neck supple.   Lymphadenopathy:      Cervical: Cervical adenopathy present.   Skin:     General: Skin is warm and dry.      Coloration: Skin is not pale.      Findings: No rash.   Neurological:      General: No focal deficit present.      Mental Status: He is alert. Mental status is at baseline.      Motor: No abnormal muscle tone.      Gait: Gait normal.   Psychiatric:         Mood and Affect: Mood normal.         Behavior: Behavior normal.         Thought Content: Thought content normal.         Judgment: Judgment normal.          Judy Daniels DO

## 2024-02-26 NOTE — ASSESSMENT & PLAN NOTE
LDL still above goal, increase Atorvastatin from 20 mg to 40 mg, healthy diet/regular exercise encouraged, recheck FLP in 6 mos -BW order given, will follow

## 2024-02-26 NOTE — ASSESSMENT & PLAN NOTE
FBS wnl but A1c just in IFG range, urged healthy diet and regular exercise, will recheck in 6 mos - BW order given, will follow

## 2024-02-26 NOTE — PATIENT INSTRUCTIONS
Medicare Preventive Visit Patient Instructions  Thank you for completing your Welcome to Medicare Visit or Medicare Annual Wellness Visit today. Your next wellness visit will be due in one year (2/26/2025).  The screening/preventive services that you may require over the next 5-10 years are detailed below. Some tests may not apply to you based off risk factors and/or age. Screening tests ordered at today's visit but not completed yet may show as past due. Also, please note that scanned in results may not display below.  Preventive Screenings:  Service Recommendations Previous Testing/Comments   Colorectal Cancer Screening  Colonoscopy    Fecal Occult Blood Test (FOBT)/Fecal Immunochemical Test (FIT)  Fecal DNA/Cologuard Test  Flexible Sigmoidoscopy Age: 45-75 years old   Colonoscopy: every 10 years (May be performed more frequently if at higher risk)  OR  FOBT/FIT: every 1 year  OR  Cologuard: every 3 years  OR  Sigmoidoscopy: every 5 years  Screening may be recommended earlier than age 45 if at higher risk for colorectal cancer. Also, an individualized decision between you and your healthcare provider will decide whether screening between the ages of 76-85 would be appropriate. Colonoscopy: 04/04/2022  FOBT/FIT: Not on file  Cologuard: Not on file  Sigmoidoscopy: Not on file    Screening Current     Prostate Cancer Screening Individualized decision between patient and health care provider in men between ages of 55-69   Medicare will cover every 12 months beginning on the day after your 50th birthday PSA: 0.5 ng/mL     Screening Current     Hepatitis C Screening Once for adults born between 1945 and 1965  More frequently in patients at high risk for Hepatitis C Hep C Antibody: 08/10/2023    Screening Current   Diabetes Screening 1-2 times per year if you're at risk for diabetes or have pre-diabetes Fasting glucose: 105 mg/dL (6/9/2020)  A1C: 5.7 % (2/21/2024)  Screening Current   Cholesterol Screening Once every 5  years if you don't have a lipid disorder. May order more often based on risk factors. Lipid panel: 02/21/2024  Screening Current      Other Preventive Screenings Covered by Medicare:  Abdominal Aortic Aneurysm (AAA) Screening: covered once if your at risk. You're considered to be at risk if you have a family history of AAA or a male between the age of 65-75 who smoking at least 100 cigarettes in your lifetime.  Lung Cancer Screening: covers low dose CT scan once per year if you meet all of the following conditions: (1) Age 55-77; (2) No signs or symptoms of lung cancer; (3) Current smoker or have quit smoking within the last 15 years; (4) You have a tobacco smoking history of at least 20 pack years (packs per day x number of years you smoked); (5) You get a written order from a healthcare provider.  Glaucoma Screening: covered annually if you're considered high risk: (1) You have diabetes OR (2) Family history of glaucoma OR (3)  aged 50 and older OR (4)  American aged 65 and older  Osteoporosis Screening: covered every 2 years if you meet one of the following conditions: (1) Have a vertebral abnormality; (2) On glucocorticoid therapy for more than 3 months; (3) Have primary hyperparathyroidism; (4) On osteoporosis medications and need to assess response to drug therapy.  HIV Screening: covered annually if you're between the age of 15-65. Also covered annually if you are younger than 15 and older than 65 with risk factors for HIV infection. For pregnant patients, it is covered up to 3 times per pregnancy.    Immunizations:  Immunization Recommendations   Influenza Vaccine Annual influenza vaccination during flu season is recommended for all persons aged >= 6 months who do not have contraindications   Pneumococcal Vaccine   * Pneumococcal conjugate vaccine = PCV13 (Prevnar 13), PCV15 (Vaxneuvance), PCV20 (Prevnar 20)  * Pneumococcal polysaccharide vaccine = PPSV23 (Pneumovax) Adults 19-65 yo  with certain risk factors or if 65+ yo  If never received any pneumonia vaccine: recommend Prevnar 20 (PCV20)  Give PCV20 if previously received 1 dose of PCV13 or PPSV23   Hepatitis B Vaccine 3 dose series if at intermediate or high risk (ex: diabetes, end stage renal disease, liver disease)   Respiratory syncytial virus (RSV) Vaccine - COVERED BY MEDICARE PART D  * RSVPreF3 (Arexvy) CDC recommends that adults 60 years of age and older may receive a single dose of RSV vaccine using shared clinical decision-making (SCDM)   Tetanus (Td) Vaccine - COST NOT COVERED BY MEDICARE PART B Following completion of primary series, a booster dose should be given every 10 years to maintain immunity against tetanus. Td may also be given as tetanus wound prophylaxis.   Tdap Vaccine - COST NOT COVERED BY MEDICARE PART B Recommended at least once for all adults. For pregnant patients, recommended with each pregnancy.   Shingles Vaccine (Shingrix) - COST NOT COVERED BY MEDICARE PART B  2 shot series recommended in those 19 years and older who have or will have weakened immune systems or those 50 years and older     Health Maintenance Due:      Topic Date Due   • Colorectal Cancer Screening  04/03/2027   • Hepatitis C Screening  Completed     Immunizations Due:      Topic Date Due   • COVID-19 Vaccine (4 - 2023-24 season) 09/01/2023     Advance Directives   What are advance directives?  Advance directives are legal documents that state your wishes and plans for medical care. These plans are made ahead of time in case you lose your ability to make decisions for yourself. Advance directives can apply to any medical decision, such as the treatments you want, and if you want to donate organs.   What are the types of advance directives?  There are many types of advance directives, and each state has rules about how to use them. You may choose a combination of any of the following:  Living will:  This is a written record of the treatment  you want. You can also choose which treatments you do not want, which to limit, and which to stop at a certain time. This includes surgery, medicine, IV fluid, and tube feedings.   Durable power of  for healthcare (DPAHC):  This is a written record that states who you want to make healthcare choices for you when you are unable to make them for yourself. This person, called a proxy, is usually a family member or a friend. You may choose more than 1 proxy.  Do not resuscitate (DNR) order:  A DNR order is used in case your heart stops beating or you stop breathing. It is a request not to have certain forms of treatment, such as CPR. A DNR order may be included in other types of advance directives.  Medical directive:  This covers the care that you want if you are in a coma, near death, or unable to make decisions for yourself. You can list the treatments you want for each condition. Treatment may include pain medicine, surgery, blood transfusions, dialysis, IV or tube feedings, and a ventilator (breathing machine).  Values history:  This document has questions about your views, beliefs, and how you feel and think about life. This information can help others choose the care that you would choose.  Why are advance directives important?  An advance directive helps you control your care. Although spoken wishes may be used, it is better to have your wishes written down. Spoken wishes can be misunderstood, or not followed. Treatments may be given even if you do not want them. An advance directive may make it easier for your family to make difficult choices about your care.   Weight Management   Why it is important to manage your weight:  Being overweight increases your risk of health conditions such as heart disease, high blood pressure, type 2 diabetes, and certain types of cancer. It can also increase your risk for osteoarthritis, sleep apnea, and other respiratory problems. Aim for a slow, steady weight loss. Even  a small amount of weight loss can lower your risk of health problems.  How to lose weight safely:  A safe and healthy way to lose weight is to eat fewer calories and get regular exercise. You can lose up about 1 pound a week by decreasing the number of calories you eat by 500 calories each day.   Healthy meal plan for weight management:  A healthy meal plan includes a variety of foods, contains fewer calories, and helps you stay healthy. A healthy meal plan includes the following:  Eat whole-grain foods more often.  A healthy meal plan should contain fiber. Fiber is the part of grains, fruits, and vegetables that is not broken down by your body. Whole-grain foods are healthy and provide extra fiber in your diet. Some examples of whole-grain foods are whole-wheat breads and pastas, oatmeal, brown rice, and bulgur.  Eat a variety of vegetables every day.  Include dark, leafy greens such as spinach, kale, justina greens, and mustard greens. Eat yellow and orange vegetables such as carrots, sweet potatoes, and winter squash.   Eat a variety of fruits every day.  Choose fresh or canned fruit (canned in its own juice or light syrup) instead of juice. Fruit juice has very little or no fiber.  Eat low-fat dairy foods.  Drink fat-free (skim) milk or 1% milk. Eat fat-free yogurt and low-fat cottage cheese. Try low-fat cheeses such as mozzarella and other reduced-fat cheeses.  Choose meat and other protein foods that are low in fat.  Choose beans or other legumes such as split peas or lentils. Choose fish, skinless poultry (chicken or turkey), or lean cuts of red meat (beef or pork). Before you cook meat or poultry, cut off any visible fat.   Use less fat and oil.  Try baking foods instead of frying them. Add less fat, such as margarine, sour cream, regular salad dressing and mayonnaise to foods. Eat fewer high-fat foods. Some examples of high-fat foods include french fries, doughnuts, ice cream, and cakes.  Eat fewer sweets.   Limit foods and drinks that are high in sugar. This includes candy, cookies, regular soda, and sweetened drinks.  Exercise:  Exercise at least 30 minutes per day on most days of the week. Some examples of exercise include walking, biking, dancing, and swimming. You can also fit in more physical activity by taking the stairs instead of the elevator or parking farther away from stores. Ask your healthcare provider about the best exercise plan for you.      © Copyright [employer] 2018 Information is for End User's use only and may not be sold, redistributed or otherwise used for commercial purposes. All illustrations and images included in CareNotes® are the copyrighted property of [employer]. or [employer]    Medicare Preventive Visit Patient Instructions  Thank you for completing your Welcome to Medicare Visit or Medicare Annual Wellness Visit today. Your next wellness visit will be due in one year (2/26/2025).  The screening/preventive services that you may require over the next 5-10 years are detailed below. Some tests may not apply to you based off risk factors and/or age. Screening tests ordered at today's visit but not completed yet may show as past due. Also, please note that scanned in results may not display below.  Preventive Screenings:  Service Recommendations Previous Testing/Comments   Colorectal Cancer Screening  Colonoscopy    Fecal Occult Blood Test (FOBT)/Fecal Immunochemical Test (FIT)  Fecal DNA/Cologuard Test  Flexible Sigmoidoscopy Age: 45-75 years old   Colonoscopy: every 10 years (May be performed more frequently if at higher risk)  OR  FOBT/FIT: every 1 year  OR  Cologuard: every 3 years  OR  Sigmoidoscopy: every 5 years  Screening may be recommended earlier than age 45 if at higher risk for colorectal cancer. Also, an individualized decision between you and your healthcare provider will decide whether screening between the ages of 76-85 would be appropriate. Colonoscopy:  04/04/2022  FOBT/FIT: Not on file  Cologuard: Not on file  Sigmoidoscopy: Not on file    Screening Current     Prostate Cancer Screening Individualized decision between patient and health care provider in men between ages of 55-69   Medicare will cover every 12 months beginning on the day after your 50th birthday PSA: 0.5 ng/mL     Screening Current     Hepatitis C Screening Once for adults born between 1945 and 1965  More frequently in patients at high risk for Hepatitis C Hep C Antibody: 08/10/2023    Screening Current   Diabetes Screening 1-2 times per year if you're at risk for diabetes or have pre-diabetes Fasting glucose: 105 mg/dL (6/9/2020)  A1C: 5.7 % (2/21/2024)  Screening Current   Cholesterol Screening Once every 5 years if you don't have a lipid disorder. May order more often based on risk factors. Lipid panel: 02/21/2024  Screening Current      Other Preventive Screenings Covered by Medicare:  Abdominal Aortic Aneurysm (AAA) Screening: covered once if your at risk. You're considered to be at risk if you have a family history of AAA or a male between the age of 65-75 who smoking at least 100 cigarettes in your lifetime.  Lung Cancer Screening: covers low dose CT scan once per year if you meet all of the following conditions: (1) Age 55-77; (2) No signs or symptoms of lung cancer; (3) Current smoker or have quit smoking within the last 15 years; (4) You have a tobacco smoking history of at least 20 pack years (packs per day x number of years you smoked); (5) You get a written order from a healthcare provider.  Glaucoma Screening: covered annually if you're considered high risk: (1) You have diabetes OR (2) Family history of glaucoma OR (3)  aged 50 and older OR (4)  American aged 65 and older  Osteoporosis Screening: covered every 2 years if you meet one of the following conditions: (1) Have a vertebral abnormality; (2) On glucocorticoid therapy for more than 3 months; (3) Have  primary hyperparathyroidism; (4) On osteoporosis medications and need to assess response to drug therapy.  HIV Screening: covered annually if you're between the age of 15-65. Also covered annually if you are younger than 15 and older than 65 with risk factors for HIV infection. For pregnant patients, it is covered up to 3 times per pregnancy.    Immunizations:  Immunization Recommendations   Influenza Vaccine Annual influenza vaccination during flu season is recommended for all persons aged >= 6 months who do not have contraindications   Pneumococcal Vaccine   * Pneumococcal conjugate vaccine = PCV13 (Prevnar 13), PCV15 (Vaxneuvance), PCV20 (Prevnar 20)  * Pneumococcal polysaccharide vaccine = PPSV23 (Pneumovax) Adults 19-65 yo with certain risk factors or if 65+ yo  If never received any pneumonia vaccine: recommend Prevnar 20 (PCV20)  Give PCV20 if previously received 1 dose of PCV13 or PPSV23   Hepatitis B Vaccine 3 dose series if at intermediate or high risk (ex: diabetes, end stage renal disease, liver disease)   Respiratory syncytial virus (RSV) Vaccine - COVERED BY MEDICARE PART D  * RSVPreF3 (Arexvy) CDC recommends that adults 60 years of age and older may receive a single dose of RSV vaccine using shared clinical decision-making (SCDM)   Tetanus (Td) Vaccine - COST NOT COVERED BY MEDICARE PART B Following completion of primary series, a booster dose should be given every 10 years to maintain immunity against tetanus. Td may also be given as tetanus wound prophylaxis.   Tdap Vaccine - COST NOT COVERED BY MEDICARE PART B Recommended at least once for all adults. For pregnant patients, recommended with each pregnancy.   Shingles Vaccine (Shingrix) - COST NOT COVERED BY MEDICARE PART B  2 shot series recommended in those 19 years and older who have or will have weakened immune systems or those 50 years and older     Health Maintenance Due:      Topic Date Due   • Colorectal Cancer Screening  04/03/2027   •  Hepatitis C Screening  Completed     Immunizations Due:      Topic Date Due   • COVID-19 Vaccine (4 - 2023-24 season) 09/01/2023     Advance Directives   What are advance directives?  Advance directives are legal documents that state your wishes and plans for medical care. These plans are made ahead of time in case you lose your ability to make decisions for yourself. Advance directives can apply to any medical decision, such as the treatments you want, and if you want to donate organs.   What are the types of advance directives?  There are many types of advance directives, and each state has rules about how to use them. You may choose a combination of any of the following:  Living will:  This is a written record of the treatment you want. You can also choose which treatments you do not want, which to limit, and which to stop at a certain time. This includes surgery, medicine, IV fluid, and tube feedings.   Durable power of  for healthcare (DPAHC):  This is a written record that states who you want to make healthcare choices for you when you are unable to make them for yourself. This person, called a proxy, is usually a family member or a friend. You may choose more than 1 proxy.  Do not resuscitate (DNR) order:  A DNR order is used in case your heart stops beating or you stop breathing. It is a request not to have certain forms of treatment, such as CPR. A DNR order may be included in other types of advance directives.  Medical directive:  This covers the care that you want if you are in a coma, near death, or unable to make decisions for yourself. You can list the treatments you want for each condition. Treatment may include pain medicine, surgery, blood transfusions, dialysis, IV or tube feedings, and a ventilator (breathing machine).  Values history:  This document has questions about your views, beliefs, and how you feel and think about life. This information can help others choose the care that you  would choose.  Why are advance directives important?  An advance directive helps you control your care. Although spoken wishes may be used, it is better to have your wishes written down. Spoken wishes can be misunderstood, or not followed. Treatments may be given even if you do not want them. An advance directive may make it easier for your family to make difficult choices about your care.   Weight Management   Why it is important to manage your weight:  Being overweight increases your risk of health conditions such as heart disease, high blood pressure, type 2 diabetes, and certain types of cancer. It can also increase your risk for osteoarthritis, sleep apnea, and other respiratory problems. Aim for a slow, steady weight loss. Even a small amount of weight loss can lower your risk of health problems.  How to lose weight safely:  A safe and healthy way to lose weight is to eat fewer calories and get regular exercise. You can lose up about 1 pound a week by decreasing the number of calories you eat by 500 calories each day.   Healthy meal plan for weight management:  A healthy meal plan includes a variety of foods, contains fewer calories, and helps you stay healthy. A healthy meal plan includes the following:  Eat whole-grain foods more often.  A healthy meal plan should contain fiber. Fiber is the part of grains, fruits, and vegetables that is not broken down by your body. Whole-grain foods are healthy and provide extra fiber in your diet. Some examples of whole-grain foods are whole-wheat breads and pastas, oatmeal, brown rice, and bulgur.  Eat a variety of vegetables every day.  Include dark, leafy greens such as spinach, kale, justina greens, and mustard greens. Eat yellow and orange vegetables such as carrots, sweet potatoes, and winter squash.   Eat a variety of fruits every day.  Choose fresh or canned fruit (canned in its own juice or light syrup) instead of juice. Fruit juice has very little or no  fiber.  Eat low-fat dairy foods.  Drink fat-free (skim) milk or 1% milk. Eat fat-free yogurt and low-fat cottage cheese. Try low-fat cheeses such as mozzarella and other reduced-fat cheeses.  Choose meat and other protein foods that are low in fat.  Choose beans or other legumes such as split peas or lentils. Choose fish, skinless poultry (chicken or turkey), or lean cuts of red meat (beef or pork). Before you cook meat or poultry, cut off any visible fat.   Use less fat and oil.  Try baking foods instead of frying them. Add less fat, such as margarine, sour cream, regular salad dressing and mayonnaise to foods. Eat fewer high-fat foods. Some examples of high-fat foods include french fries, doughnuts, ice cream, and cakes.  Eat fewer sweets.  Limit foods and drinks that are high in sugar. This includes candy, cookies, regular soda, and sweetened drinks.  Exercise:  Exercise at least 30 minutes per day on most days of the week. Some examples of exercise include walking, biking, dancing, and swimming. You can also fit in more physical activity by taking the stairs instead of the elevator or parking farther away from stores. Ask your healthcare provider about the best exercise plan for you.      © Copyright [employer] 2018 Information is for End User's use only and may not be sold, redistributed or otherwise used for commercial purposes. All illustrations and images included in CareNotes® are the copyrighted property of [employer] or [employer]

## 2024-02-28 ENCOUNTER — APPOINTMENT (OUTPATIENT)
Dept: RADIOLOGY | Facility: CLINIC | Age: 69
End: 2024-02-28
Payer: COMMERCIAL

## 2024-02-28 DIAGNOSIS — R05.2 SUBACUTE COUGH: ICD-10-CM

## 2024-02-28 PROCEDURE — 71046 X-RAY EXAM CHEST 2 VIEWS: CPT

## 2024-03-08 DIAGNOSIS — R05.2 SUBACUTE COUGH: Primary | ICD-10-CM

## 2024-03-13 ENCOUNTER — HOSPITAL ENCOUNTER (OUTPATIENT)
Dept: CT IMAGING | Facility: HOSPITAL | Age: 69
Discharge: HOME | End: 2024-03-13
Payer: COMMERCIAL

## 2024-03-13 DIAGNOSIS — R05.2 SUBACUTE COUGH: ICD-10-CM

## 2024-03-13 PROCEDURE — 71250 CT THORAX DX C-: CPT

## 2024-04-01 ENCOUNTER — HOSPITAL ENCOUNTER (OUTPATIENT)
Dept: PULMONOLOGY | Facility: HOSPITAL | Age: 69
Discharge: HOME | End: 2024-04-01
Payer: COMMERCIAL

## 2024-04-01 DIAGNOSIS — I10 ESSENTIAL HYPERTENSION: ICD-10-CM

## 2024-04-01 DIAGNOSIS — R05.2 SUBACUTE COUGH: ICD-10-CM

## 2024-04-01 PROCEDURE — 94760 N-INVAS EAR/PLS OXIMETRY 1: CPT

## 2024-04-01 PROCEDURE — 94726 PLETHYSMOGRAPHY LUNG VOLUMES: CPT | Performed by: INTERNAL MEDICINE

## 2024-04-01 PROCEDURE — 94060 EVALUATION OF WHEEZING: CPT

## 2024-04-01 PROCEDURE — 94060 EVALUATION OF WHEEZING: CPT | Performed by: INTERNAL MEDICINE

## 2024-04-01 PROCEDURE — 94729 DIFFUSING CAPACITY: CPT | Performed by: INTERNAL MEDICINE

## 2024-04-01 PROCEDURE — 94726 PLETHYSMOGRAPHY LUNG VOLUMES: CPT

## 2024-04-01 PROCEDURE — 94729 DIFFUSING CAPACITY: CPT

## 2024-04-01 RX ORDER — AMLODIPINE BESYLATE 5 MG/1
5 TABLET ORAL DAILY
Qty: 90 TABLET | Refills: 0 | Status: SHIPPED | OUTPATIENT
Start: 2024-04-01

## 2024-04-01 RX ORDER — LOSARTAN POTASSIUM 100 MG/1
100 TABLET ORAL DAILY
Qty: 90 TABLET | Refills: 0 | Status: SHIPPED | OUTPATIENT
Start: 2024-04-01

## 2024-04-01 RX ORDER — ALBUTEROL SULFATE 2.5 MG/3ML
2.5 SOLUTION RESPIRATORY (INHALATION) ONCE
Status: COMPLETED | OUTPATIENT
Start: 2024-04-01 | End: 2024-04-01

## 2024-04-01 RX ADMIN — ALBUTEROL SULFATE 2.5 MG: 2.5 SOLUTION RESPIRATORY (INHALATION) at 07:36

## 2024-04-02 ENCOUNTER — TELEPHONE (OUTPATIENT)
Dept: FAMILY MEDICINE CLINIC | Facility: HOSPITAL | Age: 69
End: 2024-04-02

## 2024-04-02 NOTE — TELEPHONE ENCOUNTER
Patient received results of PFT.    Patient reports he still has the cough.    Wondering if the dosage change for atorvastatin 2/26 could have anything to do with the cough?    pcb

## 2024-04-02 NOTE — TELEPHONE ENCOUNTER
Not a commonly reported SE but if he wishes he can try and cut the Atorvastatin in half and go back to previous dose and if cough improves then it is possible.  If cough is not better after 23 wks,  then it is unlikely the cough is d/t the increase in dose and go back to higher dose of the statin.

## 2024-04-11 ENCOUNTER — OFFICE VISIT (OUTPATIENT)
Dept: OBGYN CLINIC | Facility: CLINIC | Age: 69
End: 2024-04-11
Payer: COMMERCIAL

## 2024-04-11 ENCOUNTER — APPOINTMENT (OUTPATIENT)
Dept: RADIOLOGY | Facility: CLINIC | Age: 69
End: 2024-04-11
Payer: COMMERCIAL

## 2024-04-11 VITALS
HEIGHT: 68 IN | DIASTOLIC BLOOD PRESSURE: 74 MMHG | BODY MASS INDEX: 37.13 KG/M2 | WEIGHT: 245 LBS | SYSTOLIC BLOOD PRESSURE: 120 MMHG

## 2024-04-11 DIAGNOSIS — M17.12 PRIMARY OSTEOARTHRITIS OF LEFT KNEE: Primary | ICD-10-CM

## 2024-04-11 DIAGNOSIS — M25.562 LEFT KNEE PAIN, UNSPECIFIED CHRONICITY: ICD-10-CM

## 2024-04-11 PROCEDURE — 73564 X-RAY EXAM KNEE 4 OR MORE: CPT

## 2024-04-11 PROCEDURE — 99213 OFFICE O/P EST LOW 20 MIN: CPT | Performed by: ORTHOPAEDIC SURGERY

## 2024-04-11 NOTE — ASSESSMENT & PLAN NOTE
Findings are consistent with left knee osteoarthritis most severe in the lateral compartment.  X-rays were obtained and reviewed with patient and patient's wife at today's visit.  Operative and nonoperative treatment options were discussed with patient.  Nonoperative treatment options discussed with patient where PT, bracing, topical gels, OTC medication, steroid injection, gel injection.  Patient was given short hinged knee brace at today's visit.  Patient may apply Aspercreme or Voltaren gel over left knee.  I advised patient he should avoid activities such as repetitive squatting, kneeling, and climbing.  Encourage patient to do low impact activities such as stationary bike or swimming.  Patient declined left knee steroid injection at today's visit.  Patient may follow-up on an as-needed basis if he would wish to proceed with left knee steroid injection in the future.  All patient's questions were answered to his satisfaction.  This note is created using dictation transcription.  It may contain typographical errors, grammatical errors, improperly dictated words, background noise and other errors.

## 2024-04-11 NOTE — PROGRESS NOTES
Assessment:     1. Primary osteoarthritis of left knee        Plan:     Problem List Items Addressed This Visit          Musculoskeletal and Integument    Primary osteoarthritis of left knee - Primary     Findings are consistent with left knee osteoarthritis most severe in the lateral compartment.  X-rays were obtained and reviewed with patient and patient's wife at today's visit.  Operative and nonoperative treatment options were discussed with patient.  Nonoperative treatment options discussed with patient where PT, bracing, topical gels, OTC medication, steroid injection, gel injection.  Patient was given short hinged knee brace at today's visit.  Patient may apply Aspercreme or Voltaren gel over left knee.  I advised patient he should avoid activities such as repetitive squatting, kneeling, and climbing.  Encourage patient to do low impact activities such as stationary bike or swimming.  Patient declined left knee steroid injection at today's visit.  Patient may follow-up on an as-needed basis if he would wish to proceed with left knee steroid injection in the future.  All patient's questions were answered to his satisfaction.  This note is created using dictation transcription.  It may contain typographical errors, grammatical errors, improperly dictated words, background noise and other errors.         Relevant Orders    XR knee 4+ vw left injury    Brace      Subjective:     Patient ID: Eduard Ferguson is a 68 y.o. male.  Chief Complaint:  Patient is a 68-year-old male here for initial evaluation of left knee pain.  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  Patient states majority of his pain is along the lateral aspect of his knee and describes as achy.  Patient states activities such as walking, squatting, kneeling aggravates his pain the  most.  Patient denies any instability or locking within the knee.  Patient states she has been taking Tylenol and applying ice over his knee.  Patient cannot take NSAIDs due to blood pressure.  Patient states he has had no recent physical therapy.  Patient states he did have a left knee arthroscopy with lateral meniscectomy in 2018 with Dr. Burgos.    Allergy:  Allergies   Allergen Reactions    Lisinopril Cough     Medications:  all current active meds have been reviewed  Past Medical History:  Past Medical History:   Diagnosis Date    Colon polyp     Hypertension 2016     Past Surgical History:  Past Surgical History:   Procedure Laterality Date    COLONOSCOPY  01/18/2016    complete - serrted adenoma    CYST REMOVAL      DENTAL SURGERY  2017    Root canals due to cancer    INCISION AND DRAINAGE ABSCESS / HEMATOMA OF BURSA / KNEE / THIGH  02/09/2015    rigjt upper, inner arm abscess    PORTACATH PLACEMENT  2011    And removed      ME ARTHRS KNE SURG W/MENISCECTOMY MED/LAT W/SHVG Left 11/19/2018    Procedure: ARTHROSCOPY KNEE PARTIAL  LATERAL MENISECTOMY;  Surgeon: Nikki Burgos MD;  Location:  MAIN OR;  Service: Orthopedics    TONSILLECTOMY      As kid    VASECTOMY  1978     Family History:  Family History   Problem Relation Age of Onset    Atrial fibrillation Mother     COPD Father         Deciced    Rheumatic fever Father     Glaucoma Father         Deciced    Alcohol abuse Father         Deciced    Thyroid disease Sister     Cancer Brother     Hypertension Son     Cancer Brother     Diverticulitis Son      Social History:  Social History     Substance and Sexual Activity   Alcohol Use Yes    Alcohol/week: 0.0 standard drinks of alcohol    Comment: 1 / year     Social History     Substance and Sexual Activity   Drug Use No     Social History     Tobacco Use   Smoking Status Never   Smokeless Tobacco Never     Review of Systems   Constitutional:  Negative for chills, fever and unexpected weight change.   HENT:   "Negative for hearing loss, nosebleeds and sore throat.    Eyes:  Negative for pain, redness and visual disturbance.   Respiratory:  Negative for cough, shortness of breath and wheezing.    Cardiovascular:  Negative for chest pain, palpitations and leg swelling.   Gastrointestinal:  Negative for abdominal pain, nausea and vomiting.   Endocrine: Negative for polyphagia and polyuria.   Genitourinary:  Negative for dysuria and hematuria.   Musculoskeletal:  Positive for arthralgias (Left knee) and joint swelling (Left knee).        See HPI   Skin:  Negative for rash and wound.   Neurological:  Negative for dizziness, numbness and headaches.   Psychiatric/Behavioral:  Negative for decreased concentration and suicidal ideas. The patient is not nervous/anxious.          Objective:  BP Readings from Last 1 Encounters:   04/11/24 120/74      Wt Readings from Last 1 Encounters:   04/11/24 111 kg (245 lb)      BMI:   Estimated body mass index is 37.25 kg/m² as calculated from the following:    Height as of this encounter: 5' 8\" (1.727 m).    Weight as of this encounter: 111 kg (245 lb).  BSA:   Estimated body surface area is 2.23 meters squared as calculated from the following:    Height as of this encounter: 5' 8\" (1.727 m).    Weight as of this encounter: 111 kg (245 lb).   Physical Exam  Vitals and nursing note reviewed.   Constitutional:       Appearance: Normal appearance. He is well-developed.   HENT:      Head: Normocephalic and atraumatic.      Right Ear: External ear normal.      Left Ear: External ear normal.      Nose: Nose normal.   Eyes:      General: No scleral icterus.     Extraocular Movements: Extraocular movements intact.      Conjunctiva/sclera: Conjunctivae normal.   Pulmonary:      Effort: Pulmonary effort is normal. No respiratory distress.   Musculoskeletal:      Cervical back: Neck supple.      Left knee: No effusion.      Instability Tests: Medial Maryanne test negative and lateral Maryanne test " negative.      Comments: See Ortho exam   Skin:     General: Skin is warm and dry.   Neurological:      General: No focal deficit present.      Mental Status: He is alert and oriented to person, place, and time.   Psychiatric:         Mood and Affect: Mood normal.         Behavior: Behavior normal.       Left Knee Exam     Tenderness   The patient is experiencing tenderness in the lateral joint line.    Range of Motion   Extension:  normal   Flexion:  normal     Tests   Maryanne:  Medial - negative Lateral - negative  Varus: negative Valgus: negative  Patellar apprehension: negative    Other   Erythema: absent  Scars: present  Sensation: normal  Pulse: present  Swelling: none  Effusion: no effusion present    Comments:  Patellofemoral joint crepitation with knee motion            I have personally reviewed pertinent films in PACS and my interpretation is Left knee xrays: demonstrates left knee osteoarthritis most severe in the lateral compartment with joint narrowing.  No soft tissue calcification.    Scribe Attestation      I,:  Shaggy Aragon am acting as a scribe while in the presence of the attending physician.:       I,:  Katya Faria MD personally performed the services described in this documentation    as scribed in my presence.:

## 2024-07-11 DIAGNOSIS — E78.5 DYSLIPIDEMIA: ICD-10-CM

## 2024-07-11 RX ORDER — ATORVASTATIN CALCIUM 40 MG/1
40 TABLET, FILM COATED ORAL DAILY
Qty: 90 TABLET | Refills: 0 | Status: SHIPPED | OUTPATIENT
Start: 2024-07-11

## 2024-07-21 DIAGNOSIS — I10 ESSENTIAL HYPERTENSION: ICD-10-CM

## 2024-07-21 RX ORDER — AMLODIPINE BESYLATE 5 MG/1
5 TABLET ORAL DAILY
Qty: 90 TABLET | Refills: 1 | Status: SHIPPED | OUTPATIENT
Start: 2024-07-21

## 2024-07-21 RX ORDER — LOSARTAN POTASSIUM 100 MG/1
100 TABLET ORAL DAILY
Qty: 90 TABLET | Refills: 1 | Status: SHIPPED | OUTPATIENT
Start: 2024-07-21

## 2024-08-23 LAB
ALBUMIN SERPL-MCNC: 4.4 G/DL (ref 3.9–4.9)
ALP SERPL-CCNC: 51 IU/L (ref 44–121)
ALT SERPL-CCNC: 19 IU/L (ref 0–44)
AST SERPL-CCNC: 26 IU/L (ref 0–40)
BASOPHILS # BLD AUTO: 0 X10E3/UL (ref 0–0.2)
BASOPHILS NFR BLD AUTO: 1 %
BILIRUB SERPL-MCNC: 1.1 MG/DL (ref 0–1.2)
BUN SERPL-MCNC: 15 MG/DL (ref 8–27)
BUN/CREAT SERPL: 16 (ref 10–24)
CALCIUM SERPL-MCNC: 9 MG/DL (ref 8.6–10.2)
CHLORIDE SERPL-SCNC: 101 MMOL/L (ref 96–106)
CHOLEST SERPL-MCNC: 142 MG/DL (ref 100–199)
CHOLEST/HDLC SERPL: 3 RATIO (ref 0–5)
CO2 SERPL-SCNC: 24 MMOL/L (ref 20–29)
CREAT SERPL-MCNC: 0.93 MG/DL (ref 0.76–1.27)
EGFR: 88 ML/MIN/1.73
EOSINOPHIL # BLD AUTO: 0.1 X10E3/UL (ref 0–0.4)
EOSINOPHIL NFR BLD AUTO: 1 %
ERYTHROCYTE [DISTWIDTH] IN BLOOD BY AUTOMATED COUNT: 13.2 % (ref 11.6–15.4)
EST. AVERAGE GLUCOSE BLD GHB EST-MCNC: 111 MG/DL
GLOBULIN SER-MCNC: 2.1 G/DL (ref 1.5–4.5)
GLUCOSE SERPL-MCNC: 100 MG/DL (ref 70–99)
HBA1C MFR BLD: 5.5 % (ref 4.8–5.6)
HCT VFR BLD AUTO: 48.9 % (ref 37.5–51)
HDLC SERPL-MCNC: 48 MG/DL
HGB BLD-MCNC: 16.1 G/DL (ref 13–17.7)
IMM GRANULOCYTES # BLD: 0 X10E3/UL (ref 0–0.1)
IMM GRANULOCYTES NFR BLD: 0 %
LDLC SERPL CALC-MCNC: 81 MG/DL (ref 0–99)
LYMPHOCYTES # BLD AUTO: 1.5 X10E3/UL (ref 0.7–3.1)
LYMPHOCYTES NFR BLD AUTO: 19 %
MCH RBC QN AUTO: 30.1 PG (ref 26.6–33)
MCHC RBC AUTO-ENTMCNC: 32.9 G/DL (ref 31.5–35.7)
MCV RBC AUTO: 91 FL (ref 79–97)
MONOCYTES # BLD AUTO: 0.5 X10E3/UL (ref 0.1–0.9)
MONOCYTES NFR BLD AUTO: 6 %
NEUTROPHILS # BLD AUTO: 5.8 X10E3/UL (ref 1.4–7)
NEUTROPHILS NFR BLD AUTO: 73 %
PLATELET # BLD AUTO: 194 X10E3/UL (ref 150–450)
POTASSIUM SERPL-SCNC: 4.6 MMOL/L (ref 3.5–5.2)
PROT SERPL-MCNC: 6.5 G/DL (ref 6–8.5)
RBC # BLD AUTO: 5.35 X10E6/UL (ref 4.14–5.8)
SL AMB VLDL CHOLESTEROL CALC: 13 MG/DL (ref 5–40)
SODIUM SERPL-SCNC: 138 MMOL/L (ref 134–144)
TRIGL SERPL-MCNC: 64 MG/DL (ref 0–149)
TSH SERPL DL<=0.005 MIU/L-ACNC: 2.47 UIU/ML (ref 0.45–4.5)
WBC # BLD AUTO: 7.9 X10E3/UL (ref 3.4–10.8)

## 2024-09-05 ENCOUNTER — OFFICE VISIT (OUTPATIENT)
Dept: FAMILY MEDICINE CLINIC | Facility: HOSPITAL | Age: 69
End: 2024-09-05
Payer: COMMERCIAL

## 2024-09-05 VITALS
OXYGEN SATURATION: 97 % | SYSTOLIC BLOOD PRESSURE: 120 MMHG | WEIGHT: 240 LBS | HEART RATE: 66 BPM | DIASTOLIC BLOOD PRESSURE: 80 MMHG | HEIGHT: 68 IN | BODY MASS INDEX: 36.37 KG/M2 | TEMPERATURE: 97.8 F

## 2024-09-05 DIAGNOSIS — R73.9 HYPERGLYCEMIA: Primary | ICD-10-CM

## 2024-09-05 DIAGNOSIS — R91.1 LUNG NODULE SEEN ON IMAGING STUDY: ICD-10-CM

## 2024-09-05 DIAGNOSIS — E78.5 DYSLIPIDEMIA: ICD-10-CM

## 2024-09-05 DIAGNOSIS — I10 PRIMARY HYPERTENSION: ICD-10-CM

## 2024-09-05 DIAGNOSIS — I77.811 ABDOMINAL AORTIC ECTASIA (HCC): ICD-10-CM

## 2024-09-05 DIAGNOSIS — Z12.5 PROSTATE CANCER SCREENING: ICD-10-CM

## 2024-09-05 PROCEDURE — 1159F MED LIST DOCD IN RCRD: CPT | Performed by: INTERNAL MEDICINE

## 2024-09-05 PROCEDURE — 3079F DIAST BP 80-89 MM HG: CPT | Performed by: INTERNAL MEDICINE

## 2024-09-05 PROCEDURE — 99214 OFFICE O/P EST MOD 30 MIN: CPT | Performed by: INTERNAL MEDICINE

## 2024-09-05 PROCEDURE — 1160F RVW MEDS BY RX/DR IN RCRD: CPT | Performed by: INTERNAL MEDICINE

## 2024-09-05 PROCEDURE — 3074F SYST BP LT 130 MM HG: CPT | Performed by: INTERNAL MEDICINE

## 2024-09-05 PROCEDURE — G2211 COMPLEX E/M VISIT ADD ON: HCPCS | Performed by: INTERNAL MEDICINE

## 2024-09-05 PROCEDURE — 3725F SCREEN DEPRESSION PERFORMED: CPT | Performed by: INTERNAL MEDICINE

## 2024-09-05 NOTE — ASSESSMENT & PLAN NOTE
LDL now at goal, con't current statin, recheck FLP annually, healthy diet and regular formal exercise encouraged, will follow

## 2024-09-05 NOTE — ASSESSMENT & PLAN NOTE
FBS just borderline at 100, A1C wnl at 5.5, healthy diet/regular exercise/wgt loss encouraged, recheck BW in 6 mos- BW order given, will follow

## 2024-09-05 NOTE — PROGRESS NOTES
Ambulatory Visit  Name: Eduard Ferguson      : 1955      MRN: [id number]  Encounter Provider: Judy Daniels DO  Encounter Date: 2024   Encounter department: [facility] [address]     Assessment & Plan   1. Hyperglycemia  Assessment & Plan:  FBS just borderline at 100, A1C wnl at 5.5, healthy diet/regular exercise/wgt loss encouraged, recheck BW in 6 mos- BW order given, will follow  Orders:  -     Hemoglobin A1C; Future; Expected date: 2025  -     Glucose, fasting; Future; Expected date: 2025  -     Hemoglobin A1C  -     Glucose, fasting  2. Dyslipidemia  Assessment & Plan:  LDL now at goal, con't current statin, recheck FLP annually, healthy diet and regular formal exercise encouraged, will follow  3. Primary hypertension  Assessment & Plan:  BP at goal by end of appt, con't current meds, recheck in 6 mos  4. Abdominal aortic ectasia (HCC)  Assessment & Plan:  AAA US ordered, will follow  Orders:  -     CT chest wo contrast; Future; Expected date: 2024  -     US abdominal aorta screening aaa; Future; Expected date: 2024  5. Lung nodule seen on imaging study  Comments:  F/U CT due this month - order given and importance of f/u reviewed, currently w/o pulm or red flag symptoms, will follow  Orders:  -     CT chest wo contrast; Future; Expected date: 2024  6. Prostate cancer screening  -     PSA Total (Reflex To Free); Future; Expected date: 2025  -     PSA Total (Reflex To Free)      Depression Screening and Follow-up Plan: Patient was screened for depression during today's encounter. They screened negative with a PHQ-2 score of 0.      Colonoscopy  - 5 yrs    PSA  - order given for     BW  (A1C 5.5)    [place] [employer] [place] [address] [place] - bucket list trip      History of Present Illness     HPI Pt here for follow up appt and BW results    BW results were d/w pt in detail: FBS/A1C 100/5.5, rest of  "CMP/CBC/FLP/TSH were all wnl    Def of nml vs IFG vs DM was d/w pt in detail. Diet/exercise was reviewed - wgt down 3 lbs from Feb 24.  He admits diet is poor. He is not doing any formal exercise as he is working more.  His job is more physically active.     Goal FLP was d/w pt in detail.  Diet/exercise reviewed as noted above.  He is taking his Atorvastatin daily as directed w/o Se - last visit we increased the dose from 20 mg to 40 mg.  He notes no stroke/TIA symptoms/CP.     BP above goal today and meds were reviewed and no changes have occurred.  He denies missing doses of meds or SE with the meds.  He does not check his BP outside the office as his machine at home is \"all over the place\".  He notes no frequent HA's/dizziness/double vision/CP.  He does add salt to his diet but states \"its not much\" and he admits he likes chips.      CT PE results reviewed - needs f/u for ascending aortic ectasia and 7 mm centrally located lung nodule.  He notes no further cough/SOB/wheezing/hemoptysis/unintentional wgt loss/night sweats.        Review of Systems   Constitutional:  Negative for chills, fever and unexpected weight change.   HENT:  Negative for congestion and sore throat.    Eyes:  Negative for pain and visual disturbance.   Respiratory:  Negative for cough, shortness of breath and wheezing.    Cardiovascular:  Negative for chest pain, palpitations and leg swelling.   Gastrointestinal:  Negative for abdominal pain, blood in stool, constipation, diarrhea, nausea and vomiting.   Genitourinary:  Negative for difficulty urinating and dysuria.   Musculoskeletal:  Positive for arthralgias. Negative for myalgias.   Skin:  Negative for rash and wound.   Neurological:  Negative for dizziness and headaches.   Hematological:  Does not bruise/bleed easily.   Psychiatric/Behavioral:  Negative for confusion and dysphoric mood.        Objective     /80   Pulse 66   Temp 97.8 °F (36.6 °C)   Ht 5' 8\" (1.727 m)   Wt 109 kg " (240 lb)   SpO2 97%   BMI 36.49 kg/m²     Physical Exam  Vitals and nursing note reviewed.   Constitutional:       General: He is not in acute distress.     Appearance: He is well-developed. He is not ill-appearing.   HENT:      Head: Normocephalic and atraumatic.      Right Ear: External ear normal.      Left Ear: External ear normal.   Eyes:      General:         Right eye: No discharge.         Left eye: No discharge.      Conjunctiva/sclera: Conjunctivae normal.   Neck:      Trachea: No tracheal deviation.   Cardiovascular:      Rate and Rhythm: Normal rate and regular rhythm.      Heart sounds: Normal heart sounds. No murmur heard.  Pulmonary:      Effort: Pulmonary effort is normal. No respiratory distress.      Breath sounds: Normal breath sounds. No wheezing, rhonchi or rales.   Musculoskeletal:      Cervical back: Neck supple.      Right lower leg: No edema.      Left lower leg: No edema.   Skin:     General: Skin is warm and dry.      Coloration: Skin is not pale.      Findings: No rash.   Neurological:      General: No focal deficit present.      Mental Status: He is alert. Mental status is at baseline.      Motor: No abnormal muscle tone.      Gait: Gait normal.   Psychiatric:         Mood and Affect: Mood normal.         Behavior: Behavior normal.         Thought Content: Thought content normal.         Judgment: Judgment normal.       Administrative Statements

## 2024-09-20 ENCOUNTER — HOSPITAL ENCOUNTER (OUTPATIENT)
Dept: CT IMAGING | Facility: HOSPITAL | Age: 69
Discharge: HOME | End: 2024-09-20
Payer: COMMERCIAL

## 2024-09-20 DIAGNOSIS — R91.1 LUNG NODULE SEEN ON IMAGING STUDY: ICD-10-CM

## 2024-09-20 DIAGNOSIS — I77.811 ABDOMINAL AORTIC ECTASIA (HCC): ICD-10-CM

## 2024-09-20 PROCEDURE — 71250 CT THORAX DX C-: CPT

## 2024-09-23 ENCOUNTER — TELEPHONE (OUTPATIENT)
Dept: FAMILY MEDICINE CLINIC | Facility: HOSPITAL | Age: 69
End: 2024-09-23

## 2024-09-23 NOTE — TELEPHONE ENCOUNTER
----- Message from Judy Daniels DO sent at 9/22/2024  8:09 PM EDT -----  Please notify pt that his CT chest showed stable lung nodules and ectasia (irregularity) of the aorta.  No further significant abnormality noted.  Will d/w pt in detail at next appt.

## 2024-09-30 ENCOUNTER — HOSPITAL ENCOUNTER (OUTPATIENT)
Dept: ULTRASOUND IMAGING | Facility: HOSPITAL | Age: 69
Discharge: HOME | End: 2024-09-30
Payer: COMMERCIAL

## 2024-09-30 DIAGNOSIS — I77.811 ABDOMINAL AORTIC ECTASIA (HCC): ICD-10-CM

## 2024-09-30 PROCEDURE — 76706 US ABDL AORTA SCREEN AAA: CPT

## 2024-10-21 DIAGNOSIS — E78.5 DYSLIPIDEMIA: ICD-10-CM

## 2024-10-22 RX ORDER — ATORVASTATIN CALCIUM 40 MG/1
40 TABLET, FILM COATED ORAL DAILY
Qty: 90 TABLET | Refills: 1 | Status: SHIPPED | OUTPATIENT
Start: 2024-10-22

## 2024-11-04 DIAGNOSIS — I10 ESSENTIAL HYPERTENSION: ICD-10-CM

## 2024-11-05 RX ORDER — AMLODIPINE BESYLATE 5 MG/1
5 TABLET ORAL DAILY
Qty: 90 TABLET | Refills: 1 | Status: SHIPPED | OUTPATIENT
Start: 2024-11-05

## 2024-11-05 RX ORDER — LOSARTAN POTASSIUM 100 MG/1
100 TABLET ORAL DAILY
Qty: 90 TABLET | Refills: 1 | Status: SHIPPED | OUTPATIENT
Start: 2024-11-05

## 2025-01-21 ENCOUNTER — APPOINTMENT (EMERGENCY)
Dept: CT IMAGING | Facility: HOSPITAL | Age: 70
End: 2025-01-21
Payer: COMMERCIAL

## 2025-01-21 ENCOUNTER — APPOINTMENT (EMERGENCY)
Dept: RADIOLOGY | Facility: HOSPITAL | Age: 70
End: 2025-01-21
Payer: COMMERCIAL

## 2025-01-21 ENCOUNTER — HOSPITAL ENCOUNTER (OUTPATIENT)
Facility: HOSPITAL | Age: 70
Setting detail: OBSERVATION
Discharge: HOME | End: 2025-01-22
Attending: EMERGENCY MEDICINE | Admitting: STUDENT IN AN ORGANIZED HEALTH CARE EDUCATION/TRAINING PROGRAM
Payer: COMMERCIAL

## 2025-01-21 DIAGNOSIS — K57.92 DIVERTICULITIS: ICD-10-CM

## 2025-01-21 DIAGNOSIS — R55 SYNCOPE: ICD-10-CM

## 2025-01-21 DIAGNOSIS — J18.9 PNEUMONIA: ICD-10-CM

## 2025-01-21 DIAGNOSIS — R41.82 ALTERED MENTAL STATUS, UNSPECIFIED ALTERED MENTAL STATUS TYPE: Primary | ICD-10-CM

## 2025-01-21 DIAGNOSIS — R05.9 COUGH: ICD-10-CM

## 2025-01-21 DIAGNOSIS — R56.9 SEIZURE-LIKE ACTIVITY (HCC): ICD-10-CM

## 2025-01-21 LAB
2HR DELTA HS TROPONIN: 2 NG/L
ALBUMIN SERPL BCG-MCNC: 4 G/DL (ref 3.5–5)
ALP SERPL-CCNC: 45 U/L (ref 34–104)
ALT SERPL W P-5'-P-CCNC: 22 U/L (ref 7–52)
ANION GAP SERPL CALCULATED.3IONS-SCNC: 8 MMOL/L (ref 4–13)
APTT PPP: 32 SECONDS (ref 23–34)
AST SERPL W P-5'-P-CCNC: 38 U/L (ref 13–39)
BACTERIA UR QL AUTO: ABNORMAL /HPF
BASOPHILS # BLD AUTO: 0.04 THOUSANDS/ΜL (ref 0–0.1)
BASOPHILS NFR BLD AUTO: 1 % (ref 0–1)
BILIRUB SERPL-MCNC: 0.59 MG/DL (ref 0.2–1)
BILIRUB UR QL STRIP: NEGATIVE
BUN SERPL-MCNC: 16 MG/DL (ref 5–25)
CALCIUM SERPL-MCNC: 8.5 MG/DL (ref 8.4–10.2)
CARDIAC TROPONIN I PNL SERPL HS: 5 NG/L (ref ?–50)
CARDIAC TROPONIN I PNL SERPL HS: 7 NG/L (ref ?–50)
CHLORIDE SERPL-SCNC: 103 MMOL/L (ref 96–108)
CLARITY UR: CLEAR
CO2 SERPL-SCNC: 26 MMOL/L (ref 21–32)
COLOR UR: YELLOW
CREAT SERPL-MCNC: 0.98 MG/DL (ref 0.6–1.3)
EOSINOPHIL # BLD AUTO: 0.03 THOUSAND/ΜL (ref 0–0.61)
EOSINOPHIL NFR BLD AUTO: 1 % (ref 0–6)
ERYTHROCYTE [DISTWIDTH] IN BLOOD BY AUTOMATED COUNT: 13.6 % (ref 11.6–15.1)
FLUAV AG UPPER RESP QL IA.RAPID: NEGATIVE
FLUBV AG UPPER RESP QL IA.RAPID: NEGATIVE
GFR SERPL CREATININE-BSD FRML MDRD: 77 ML/MIN/1.73SQ M
GLUCOSE SERPL-MCNC: 110 MG/DL (ref 65–140)
GLUCOSE UR STRIP-MCNC: NEGATIVE MG/DL
HCT VFR BLD AUTO: 46.6 % (ref 36.5–49.3)
HGB BLD-MCNC: 15.4 G/DL (ref 12–17)
HGB UR QL STRIP.AUTO: ABNORMAL
IMM GRANULOCYTES # BLD AUTO: 0.02 THOUSAND/UL (ref 0–0.2)
IMM GRANULOCYTES NFR BLD AUTO: 0 % (ref 0–2)
INR PPP: 1 (ref 0.85–1.19)
KETONES UR STRIP-MCNC: NEGATIVE MG/DL
LACTATE SERPL-SCNC: 0.7 MMOL/L (ref 0.5–2)
LEUKOCYTE ESTERASE UR QL STRIP: NEGATIVE
LYMPHOCYTES # BLD AUTO: 1.15 THOUSANDS/ΜL (ref 0.6–4.47)
LYMPHOCYTES NFR BLD AUTO: 18 % (ref 14–44)
MAGNESIUM SERPL-MCNC: 1.9 MG/DL (ref 1.9–2.7)
MCH RBC QN AUTO: 29.8 PG (ref 26.8–34.3)
MCHC RBC AUTO-ENTMCNC: 33 G/DL (ref 31.4–37.4)
MCV RBC AUTO: 90 FL (ref 82–98)
MONOCYTES # BLD AUTO: 0.88 THOUSAND/ΜL (ref 0.17–1.22)
MONOCYTES NFR BLD AUTO: 14 % (ref 4–12)
MUCOUS THREADS UR QL AUTO: ABNORMAL
NEUTROPHILS # BLD AUTO: 4.26 THOUSANDS/ΜL (ref 1.85–7.62)
NEUTS SEG NFR BLD AUTO: 66 % (ref 43–75)
NITRITE UR QL STRIP: NEGATIVE
NON-SQ EPI CELLS URNS QL MICRO: ABNORMAL /HPF
NRBC BLD AUTO-RTO: 0 /100 WBCS
PH UR STRIP.AUTO: 5.5 [PH]
PLATELET # BLD AUTO: 162 THOUSANDS/UL (ref 149–390)
PMV BLD AUTO: 10.1 FL (ref 8.9–12.7)
POTASSIUM SERPL-SCNC: 3.7 MMOL/L (ref 3.5–5.3)
PROCALCITONIN SERPL-MCNC: 0.15 NG/ML
PROT SERPL-MCNC: 7 G/DL (ref 6.4–8.4)
PROT UR STRIP-MCNC: ABNORMAL MG/DL
PROTHROMBIN TIME: 13.7 SECONDS (ref 12.3–15)
RBC # BLD AUTO: 5.17 MILLION/UL (ref 3.88–5.62)
RBC #/AREA URNS AUTO: ABNORMAL /HPF
SARS-COV+SARS-COV-2 AG RESP QL IA.RAPID: NEGATIVE
SODIUM SERPL-SCNC: 137 MMOL/L (ref 135–147)
SP GR UR STRIP.AUTO: >=1.03 (ref 1–1.03)
UROBILINOGEN UR STRIP-ACNC: <2 MG/DL
WBC # BLD AUTO: 6.38 THOUSAND/UL (ref 4.31–10.16)
WBC #/AREA URNS AUTO: ABNORMAL /HPF

## 2025-01-21 PROCEDURE — 85610 PROTHROMBIN TIME: CPT

## 2025-01-21 PROCEDURE — 84484 ASSAY OF TROPONIN QUANT: CPT

## 2025-01-21 PROCEDURE — 70450 CT HEAD/BRAIN W/O DYE: CPT

## 2025-01-21 PROCEDURE — 87040 BLOOD CULTURE FOR BACTERIA: CPT

## 2025-01-21 PROCEDURE — 87811 SARS-COV-2 COVID19 W/OPTIC: CPT

## 2025-01-21 PROCEDURE — 81001 URINALYSIS AUTO W/SCOPE: CPT

## 2025-01-21 PROCEDURE — 99285 EMERGENCY DEPT VISIT HI MDM: CPT

## 2025-01-21 PROCEDURE — 96374 THER/PROPH/DIAG INJ IV PUSH: CPT

## 2025-01-21 PROCEDURE — 99222 1ST HOSP IP/OBS MODERATE 55: CPT | Performed by: INTERNAL MEDICINE

## 2025-01-21 PROCEDURE — 80053 COMPREHEN METABOLIC PANEL: CPT

## 2025-01-21 PROCEDURE — 85730 THROMBOPLASTIN TIME PARTIAL: CPT

## 2025-01-21 PROCEDURE — 74177 CT ABD & PELVIS W/CONTRAST: CPT

## 2025-01-21 PROCEDURE — 87804 INFLUENZA ASSAY W/OPTIC: CPT

## 2025-01-21 PROCEDURE — 83735 ASSAY OF MAGNESIUM: CPT | Performed by: INTERNAL MEDICINE

## 2025-01-21 PROCEDURE — 71045 X-RAY EXAM CHEST 1 VIEW: CPT

## 2025-01-21 PROCEDURE — 36415 COLL VENOUS BLD VENIPUNCTURE: CPT

## 2025-01-21 PROCEDURE — 85025 COMPLETE CBC W/AUTO DIFF WBC: CPT

## 2025-01-21 PROCEDURE — 71260 CT THORAX DX C+: CPT

## 2025-01-21 PROCEDURE — 93005 ELECTROCARDIOGRAM TRACING: CPT

## 2025-01-21 PROCEDURE — 84145 PROCALCITONIN (PCT): CPT

## 2025-01-21 PROCEDURE — 83605 ASSAY OF LACTIC ACID: CPT

## 2025-01-21 RX ORDER — LOSARTAN POTASSIUM 50 MG/1
100 TABLET ORAL DAILY
Status: DISCONTINUED | OUTPATIENT
Start: 2025-01-22 | End: 2025-01-22 | Stop reason: HOSPADM

## 2025-01-21 RX ORDER — ACETAMINOPHEN 325 MG/1
650 TABLET ORAL EVERY 6 HOURS PRN
Status: DISCONTINUED | OUTPATIENT
Start: 2025-01-21 | End: 2025-01-22 | Stop reason: HOSPADM

## 2025-01-21 RX ORDER — ONDANSETRON 2 MG/ML
4 INJECTION INTRAMUSCULAR; INTRAVENOUS EVERY 6 HOURS PRN
Status: DISCONTINUED | OUTPATIENT
Start: 2025-01-21 | End: 2025-01-22 | Stop reason: HOSPADM

## 2025-01-21 RX ORDER — BENZONATATE 100 MG/1
100 CAPSULE ORAL 3 TIMES DAILY PRN
Status: DISCONTINUED | OUTPATIENT
Start: 2025-01-21 | End: 2025-01-22 | Stop reason: HOSPADM

## 2025-01-21 RX ORDER — ATORVASTATIN CALCIUM 40 MG/1
40 TABLET, FILM COATED ORAL
Status: DISCONTINUED | OUTPATIENT
Start: 2025-01-22 | End: 2025-01-22 | Stop reason: HOSPADM

## 2025-01-21 RX ORDER — GUAIFENESIN 600 MG/1
600 TABLET, EXTENDED RELEASE ORAL EVERY 12 HOURS SCHEDULED
Status: DISCONTINUED | OUTPATIENT
Start: 2025-01-21 | End: 2025-01-22 | Stop reason: HOSPADM

## 2025-01-21 RX ORDER — HEPARIN SODIUM 5000 [USP'U]/ML
5000 INJECTION, SOLUTION INTRAVENOUS; SUBCUTANEOUS EVERY 8 HOURS SCHEDULED
Status: DISCONTINUED | OUTPATIENT
Start: 2025-01-22 | End: 2025-01-22 | Stop reason: HOSPADM

## 2025-01-21 RX ORDER — DIPHENOXYLATE HYDROCHLORIDE AND ATROPINE SULFATE 2.5; .025 MG/1; MG/1
1 TABLET ORAL DAILY
COMMUNITY

## 2025-01-21 RX ORDER — AMLODIPINE BESYLATE 5 MG/1
5 TABLET ORAL DAILY
Status: DISCONTINUED | OUTPATIENT
Start: 2025-01-22 | End: 2025-01-22 | Stop reason: HOSPADM

## 2025-01-21 RX ADMIN — IOHEXOL 100 ML: 350 INJECTION, SOLUTION INTRAVENOUS at 18:48

## 2025-01-21 RX ADMIN — GUAIFENESIN 600 MG: 600 TABLET, EXTENDED RELEASE ORAL at 22:36

## 2025-01-21 RX ADMIN — AMPICILLIN SODIUM AND SULBACTAM SODIUM 3 G: 2; 1 INJECTION, POWDER, FOR SOLUTION INTRAMUSCULAR; INTRAVENOUS at 20:05

## 2025-01-22 ENCOUNTER — TRANSITIONAL CARE MANAGEMENT (OUTPATIENT)
Dept: FAMILY MEDICINE CLINIC | Facility: HOSPITAL | Age: 70
End: 2025-01-22

## 2025-01-22 VITALS
HEART RATE: 76 BPM | WEIGHT: 245.2 LBS | TEMPERATURE: 98.5 F | SYSTOLIC BLOOD PRESSURE: 128 MMHG | OXYGEN SATURATION: 93 % | RESPIRATION RATE: 18 BRPM | DIASTOLIC BLOOD PRESSURE: 76 MMHG | BODY MASS INDEX: 36.32 KG/M2 | HEIGHT: 69 IN

## 2025-01-22 LAB
ANION GAP SERPL CALCULATED.3IONS-SCNC: 8 MMOL/L (ref 4–13)
BASOPHILS # BLD AUTO: 0.04 THOUSANDS/ΜL (ref 0–0.1)
BASOPHILS NFR BLD AUTO: 1 % (ref 0–1)
BUN SERPL-MCNC: 13 MG/DL (ref 5–25)
CALCIUM SERPL-MCNC: 8.4 MG/DL (ref 8.4–10.2)
CHLORIDE SERPL-SCNC: 104 MMOL/L (ref 96–108)
CO2 SERPL-SCNC: 24 MMOL/L (ref 21–32)
CREAT SERPL-MCNC: 0.97 MG/DL (ref 0.6–1.3)
EOSINOPHIL # BLD AUTO: 0.04 THOUSAND/ΜL (ref 0–0.61)
EOSINOPHIL NFR BLD AUTO: 1 % (ref 0–6)
ERYTHROCYTE [DISTWIDTH] IN BLOOD BY AUTOMATED COUNT: 13.7 % (ref 11.6–15.1)
GFR SERPL CREATININE-BSD FRML MDRD: 78 ML/MIN/1.73SQ M
GLUCOSE SERPL-MCNC: 102 MG/DL (ref 65–140)
HCT VFR BLD AUTO: 45.3 % (ref 36.5–49.3)
HGB BLD-MCNC: 15.1 G/DL (ref 12–17)
IMM GRANULOCYTES # BLD AUTO: 0.02 THOUSAND/UL (ref 0–0.2)
IMM GRANULOCYTES NFR BLD AUTO: 0 % (ref 0–2)
LYMPHOCYTES # BLD AUTO: 1.34 THOUSANDS/ΜL (ref 0.6–4.47)
LYMPHOCYTES NFR BLD AUTO: 26 % (ref 14–44)
MAGNESIUM SERPL-MCNC: 2 MG/DL (ref 1.9–2.7)
MCH RBC QN AUTO: 30 PG (ref 26.8–34.3)
MCHC RBC AUTO-ENTMCNC: 33.3 G/DL (ref 31.4–37.4)
MCV RBC AUTO: 90 FL (ref 82–98)
MONOCYTES # BLD AUTO: 0.68 THOUSAND/ΜL (ref 0.17–1.22)
MONOCYTES NFR BLD AUTO: 13 % (ref 4–12)
NEUTROPHILS # BLD AUTO: 3.01 THOUSANDS/ΜL (ref 1.85–7.62)
NEUTS SEG NFR BLD AUTO: 59 % (ref 43–75)
NRBC BLD AUTO-RTO: 0 /100 WBCS
PLATELET # BLD AUTO: 164 THOUSANDS/UL (ref 149–390)
PMV BLD AUTO: 9.9 FL (ref 8.9–12.7)
POTASSIUM SERPL-SCNC: 4.1 MMOL/L (ref 3.5–5.3)
PROCALCITONIN SERPL-MCNC: 0.16 NG/ML
RBC # BLD AUTO: 5.03 MILLION/UL (ref 3.88–5.62)
SODIUM SERPL-SCNC: 136 MMOL/L (ref 135–147)
WBC # BLD AUTO: 5.13 THOUSAND/UL (ref 4.31–10.16)

## 2025-01-22 PROCEDURE — 99239 HOSP IP/OBS DSCHRG MGMT >30: CPT | Performed by: STUDENT IN AN ORGANIZED HEALTH CARE EDUCATION/TRAINING PROGRAM

## 2025-01-22 PROCEDURE — 83735 ASSAY OF MAGNESIUM: CPT | Performed by: INTERNAL MEDICINE

## 2025-01-22 PROCEDURE — 85025 COMPLETE CBC W/AUTO DIFF WBC: CPT | Performed by: INTERNAL MEDICINE

## 2025-01-22 PROCEDURE — 84145 PROCALCITONIN (PCT): CPT | Performed by: INTERNAL MEDICINE

## 2025-01-22 PROCEDURE — 80048 BASIC METABOLIC PNL TOTAL CA: CPT | Performed by: INTERNAL MEDICINE

## 2025-01-22 RX ORDER — SODIUM CHLORIDE, SODIUM GLUCONATE, SODIUM ACETATE, POTASSIUM CHLORIDE, MAGNESIUM CHLORIDE, SODIUM PHOSPHATE, DIBASIC, AND POTASSIUM PHOSPHATE .53; .5; .37; .037; .03; .012; .00082 G/100ML; G/100ML; G/100ML; G/100ML; G/100ML; G/100ML; G/100ML
500 INJECTION, SOLUTION INTRAVENOUS ONCE
Status: COMPLETED | OUTPATIENT
Start: 2025-01-22 | End: 2025-01-22

## 2025-01-22 RX ORDER — ALBUMIN HUMAN 50 G/1000ML
12.5 SOLUTION INTRAVENOUS ONCE
Status: COMPLETED | OUTPATIENT
Start: 2025-01-22 | End: 2025-01-22

## 2025-01-22 RX ORDER — GUAIFENESIN 600 MG/1
600 TABLET, EXTENDED RELEASE ORAL EVERY 12 HOURS SCHEDULED
Qty: 60 TABLET | Refills: 0 | Status: SHIPPED | OUTPATIENT
Start: 2025-01-22

## 2025-01-22 RX ORDER — BENZONATATE 100 MG/1
100 CAPSULE ORAL 3 TIMES DAILY PRN
Qty: 20 CAPSULE | Refills: 0 | Status: SHIPPED | OUTPATIENT
Start: 2025-01-22

## 2025-01-22 RX ADMIN — LOSARTAN POTASSIUM 100 MG: 50 TABLET, FILM COATED ORAL at 07:53

## 2025-01-22 RX ADMIN — GUAIFENESIN 600 MG: 600 TABLET, EXTENDED RELEASE ORAL at 07:54

## 2025-01-22 RX ADMIN — HEPARIN SODIUM 5000 UNITS: 5000 INJECTION INTRAVENOUS; SUBCUTANEOUS at 13:57

## 2025-01-22 RX ADMIN — BENZONATATE 100 MG: 100 CAPSULE ORAL at 01:52

## 2025-01-22 RX ADMIN — AMLODIPINE BESYLATE 5 MG: 5 TABLET ORAL at 07:54

## 2025-01-22 RX ADMIN — ALBUMIN (HUMAN) 12.5 G: 12.5 INJECTION, SOLUTION INTRAVENOUS at 07:54

## 2025-01-22 RX ADMIN — HEPARIN SODIUM 5000 UNITS: 5000 INJECTION INTRAVENOUS; SUBCUTANEOUS at 05:48

## 2025-01-22 RX ADMIN — MULTIPLE VITAMINS W/ MINERALS TAB 1 TABLET: TAB ORAL at 07:54

## 2025-01-22 RX ADMIN — SODIUM CHLORIDE, SODIUM GLUCONATE, SODIUM ACETATE, POTASSIUM CHLORIDE, MAGNESIUM CHLORIDE, SODIUM PHOSPHATE, DIBASIC, AND POTASSIUM PHOSPHATE 500 ML: .53; .5; .37; .037; .03; .012; .00082 INJECTION, SOLUTION INTRAVENOUS at 11:21

## 2025-01-22 NOTE — DISCHARGE INSTR - AVS FIRST PAGE
Follow-up with your PCP in 1 to 2 weeks    You are to follow-up with neurology if this were to recur referral has been placed    EEG was ordered if this were to recur

## 2025-01-22 NOTE — CASE MANAGEMENT
Case Management Assessment & Discharge Planning Note    Patient name Eduard Ferguson  Location /-01 MRN [id number]  : 1955 Date 2025       Current Admission Date: 2025  Current Admission Diagnosis:AMS (altered mental status)   Patient Active Problem List    Diagnosis Date Noted Date Diagnosed    AMS (altered mental status) 2025     Primary osteoarthritis of left knee 2024     Abdominal aortic ectasia (HCC) 2024     Central serous chorioretinopathy of both eyes 2022     Colon polyp      Chronic pain of both knees 2021     Hyperglycemia 2020     Primary osteoarthritis of both knees 10/29/2018     Macular degeneration 2017     Dyslipidemia 2016     Primary hypertension 2016     Apnea 2016     Serrated adenoma of colon 2016     Severe obesity (BMI 35.0-39.9) with comorbidity (HCC) 2015     Umbilical hernia 2015     History of lymphoma 2013       LOS (days): 0  Geometric Mean LOS (GMLOS) (days):   Days to GMLOS:     OBJECTIVE:              Current admission status: Observation       Preferred Pharmacy:   [facility]/pharmacy #[id number] - [place] [id number]  Phone: [phone] Fax: [phone]    Primary Care Provider: Judy Daniels DO    Primary Insurance: BLUE CROSS [employer] REP  Secondary Insurance:     ASSESSMENT:  Active Health Care Proxies       Sukhwinder Ferguson Health Care Agent - Spouse   Primary Phone: [phone] (Mobile)                 Advance Directives  Does patient have a Health Care POA?: Yes  Does patient have Advance Directives?: Yes  Advance Directives: Power of  for health care  Primary Contact: Sukhwinder, spouse.         Readmission Root Cause  30 Day Readmission: No    Patient Information  Admitted from:: Home  Mental Status: Alert  During Assessment patient was accompanied by: Not accompanied during assessment  Assessment  information provided by:: Patient  Primary Caregiver: Self  Support Systems: Friend, Spouse/significant other, Children, Self  County of Residence: [place]  What [facility] do you live in?: [place]  Home entry access options. Select all that apply.: Stairs  Number of steps to enter home.: 3  Do the steps have railings?: No  Type of Current Residence: 2 story home  Upon entering residence, is there a bedroom on the main floor (no further steps)?: No  A bedroom is located on the following floor levels of residence (select all that apply):: 2nd Floor  Upon entering residence, is there a bathroom on the main floor (no further steps)?: Yes  Number of steps to 2nd floor from main floor: One Flight  Living Arrangements: Lives w/ Spouse/significant other  Is patient a ?: No    Activities of Daily Living Prior to Admission  Functional Status: Independent  Completes ADLs independently?: Yes  Ambulates independently?: Yes  Does patient use assisted devices?: No  Does patient currently own DME?: No  Does patient have a history of Outpatient Therapy (PT/OT)?: Yes  Does the patient have a history of Short-Term Rehab?: No  Does patient have a history of HHC?: No  Does patient currently have HHC?: No         Patient Information Continued  Income Source: SSI/SSD  Does patient have prescription coverage?: Yes  Does patient receive dialysis treatments?: No  Does patient have a history of substance abuse?: No  Does patient have a history of Mental Health Diagnosis?: No         Means of Transportation  Means of Transport to Appts:: Drives Self          DISCHARGE DETAILS:    Discharge planning discussed with:: Pt and spouse Sukhwinder  [place] of Choice: Yes     CM contacted family/caregiver?: Yes  Were Treatment Team discharge recommendations reviewed with patient/caregiver?: Yes  Did patient/caregiver verbalize understanding of patient care needs?: Yes  Were patient/caregiver advised of the risks associated with not following  Treatment Team discharge recommendations?: Yes    Contacts  Patient Contacts: Sukhwinder, spouse.  Relationship to Patient:: Family  Contact Method: In Person  Reason/Outcome: Discharge Planning, Referral    Requested Home Health Care         Is the patient interested in HHC at discharge?: No    DME Referral Provided  Referral made for DME?: No    Other Referral/Resources/Interventions Provided:  Interventions: None Indicated         Treatment Team Recommendation: Home  Discharge Destination Plan:: Home                                         Additional Comments: CM met with pt and spouse at bedside to discern discharge needs. Pt lives with spouse in a 2sh, 2-3STE, bed upstairs. Pt reports being independent with walking and ADLs PTA. Does not use or own DME. Hx of OPPT. No STR, HHC, inpatient psych, and D&A treatment. Drives. Spouse is medical POA. [facility] is preferred pharmacy.

## 2025-01-22 NOTE — ASSESSMENT & PLAN NOTE
"Presented to the ER with wife due to event PTA at home where patient was noted to have full \"full body shaking and his tongue was out\" that lasted for about 5 seconds during coughing episode. Patient unable to stop shaking but can recall his wife shouting from the couch. Wife unable to snap him out of it.   Since Friday with coughing, congestion, low grade temps (99), and poor oral intake. Denies abdominal pain. One episode of emesis after episode. Denies diarrhea   No sirs criteria.   Lactic and procal WNL   BC collected   UA negative   CT chest abdomen pelvis w contrast   Mild lingular opacities which may be infectious/inflammatory in etiology. Slightly enlarged hilar lymph nodes which may be reactive.  Colonic diverticulosis. Faint fatty infiltration in the region of a sigmoid diverticulum for which a mild uncomplicated diverticulitis is difficult to entirely exclude.  Few hepatic hyperdensities which may be perfusional in origin however nonemergent abdominal MRI with contrast is recommended for further evaluation.  A posterior right renal 1.6 cm lesion measures density higher than that of simple fluid. This can also be evaluated at time of abdominal MRI.  Received dose of IV unasyn. Hold further abx at this time. Suspect viral etiology.     CT head unremarkable   Troponin 5,7,4 hr pending   2 hr delta 2  EKG without ischemic changes   Low suspicion for seizure etiology. Normal lactic/labs, patient without incontinence, no tongue biting.   Telemetry  Discussed with neuro, very unlikely to be seizure related most likely vasovagal episode 2/2 to URI  No further episode while admitted  Orthostatics negative    F/u with neuro outpt with outpt EEG if it were to recur again   "

## 2025-01-22 NOTE — ASSESSMENT & PLAN NOTE
"Presented to the ER with wife due to event PTA at home where patient was noted to have full \"full body shaking and his tongue was out\" that lasted for about 5 seconds during coughing episode. Patient unable to stop shaking but can recall his wife shouting from the couch. Wife unable to snap him out of it.   Since Friday with coughing, congestion, low grade temps (99), and poor oral intake. Denies abdominal pain. One episode of emesis after episode. Denies diarrhea   No sirs criteria.   Lactic and procal WNL   BC collected   UA negative   CT chest abdomen pelvis w contrast   Mild lingular opacities which may be infectious/inflammatory in etiology. Slightly enlarged hilar lymph nodes which may be reactive.  Colonic diverticulosis. Faint fatty infiltration in the region of a sigmoid diverticulum for which a mild uncomplicated diverticulitis is difficult to entirely exclude.  Few hepatic hyperdensities which may be perfusional in origin however nonemergent abdominal MRI with contrast is recommended for further evaluation.  A posterior right renal 1.6 cm lesion measures density higher than that of simple fluid. This can also be evaluated at time of abdominal MRI.  Received dose of IV unasyn. Hold further abx at this time. Suspect viral etiology.     CT head unremarkable   Troponin 5,7,4 hr pending   2 hr delta 2  EKG without ischemic changes   Low suspicion for seizure etiology. Normal lactic/labs, patient without incontinence, no tongue biting.   Telemetry  "

## 2025-01-22 NOTE — INCIDENTAL FINDINGS
The following findings require follow up:  Radiographic finding   Finding: Few hepatic hyperdensities which may be perfusional in origin however nonemergent abdominal MRI with contrast is recommended for further evaluation., A posterior right renal 1.6 cm lesion measures density higher than that of simple fluid. This can also be evaluated at time of abdominal MRI.,    Follow up required: yes   Follow up should be done within 1 month     Please notify the following clinician to assist with the follow up:   PCP    Incidental finding results were discussed with the Patient by Elvira Reno MD on 01/22/25.   They expressed understanding and all questions answered.

## 2025-01-22 NOTE — H&P
"H&P - Hospitalist   Name: Eduard Ferguson 69 y.o. male I MRN: [id number]  Unit/Bed#: -01 I Date of Admission: 1/21/2025   Date of Service: 1/22/2025 I Hospital Day: 0     Assessment & Plan  AMS (altered mental status)  Presented to the ER with wife due to event PTA at home where patient was noted to have full \"full body shaking and his tongue was out\" that lasted for about 5 seconds during coughing episode. Patient unable to stop shaking but can recall his wife shouting from the couch. Wife unable to snap him out of it.   Since Friday with coughing, congestion, low grade temps (99), and poor oral intake. Denies abdominal pain. One episode of emesis after episode. Denies diarrhea   No sirs criteria.   Lactic and procal WNL   BC collected   UA negative   CT chest abdomen pelvis w contrast   Mild lingular opacities which may be infectious/inflammatory in etiology. Slightly enlarged hilar lymph nodes which may be reactive.  Colonic diverticulosis. Faint fatty infiltration in the region of a sigmoid diverticulum for which a mild uncomplicated diverticulitis is difficult to entirely exclude.  Few hepatic hyperdensities which may be perfusional in origin however nonemergent abdominal MRI with contrast is recommended for further evaluation.  A posterior right renal 1.6 cm lesion measures density higher than that of simple fluid. This can also be evaluated at time of abdominal MRI.  Received dose of IV unasyn. Hold further abx at this time. Suspect viral etiology.     CT head unremarkable   Troponin 5,7,4 hr pending   2 hr delta 2  EKG without ischemic changes   Low suspicion for seizure etiology. Normal lactic/labs, patient without incontinence, no tongue biting.   Telemetry  Dyslipidemia  Continue statin 40 mg daily   Hypertension  Home regimen   Losartan 100 mg daily   Amlodipine 5 mg daily       VTE Pharmacologic Prophylaxis: VTE Score: 3 Moderate Risk (Score 3-4) - Pharmacological DVT Prophylaxis Ordered: " heparin.  Code Status: Level 1 - Full Code   Discussion with family: Updated  (wife) at bedside.    Anticipated Length of Stay: Patient will be admitted on an observation basis with an anticipated length of stay of less than 2 midnights secondary to AMS.    History of Present Illness   Chief Complaint: gulshan Ferguson is a 69 y.o. male with a PMH of lipidemia, hypertension who presents home with wife due to event that occurred prior to coming in.  Since Friday he has had coughing, congestion, temperatures of 99 and poor oral intake.  Denying abdominal pain or diarrhea.  Tonight he was having a coughing episode and during the episode he started having full body shakes.  Per his wife he had full body shaking and his tongue was out.  At first she thought he was joking but she was screaming at him to stop but he would not.  The event only lasted for about 5 seconds.  Patient cannot recall her screaming at him but he was unable to stop himself from shaking.  After the event he felt nauseous and did have an episode of emesis.  Now he feels back to how he felt prior.  Did not have incontinence, no tongue biting or loss of consciousness.  No SIRS criteria.  Lactic and procalcitonin negative.  No new medications..  Appears viral in nature on exam.     Review of Systems   Constitutional:  Positive for appetite change and fatigue. Negative for fever.   HENT:  Positive for congestion. Negative for sore throat.    Respiratory:  Positive for cough. Negative for chest tightness and shortness of breath.    Cardiovascular:  Negative for chest pain.   Gastrointestinal:  Negative for abdominal distention, abdominal pain, diarrhea, nausea and vomiting.   Genitourinary:  Negative for difficulty urinating.   Musculoskeletal:  Negative for arthralgias.   Neurological:  Negative for weakness and headaches.   Psychiatric/Behavioral:  Negative for agitation and behavioral problems.    All other systems reviewed  and are negative.      Historical Information   Past Medical History:   Diagnosis Date    Colon polyp     Hypertension 2016     Past Surgical History:   Procedure Laterality Date    COLONOSCOPY  01/18/2016    complete - serrted adenoma    CYST REMOVAL      DENTAL SURGERY  2017    Root canals due to cancer    INCISION AND DRAINAGE ABSCESS / HEMATOMA OF BURSA / KNEE / THIGH  02/09/2015    rigjt upper, inner arm abscess    PORTACATH PLACEMENT  2011    And removed      NE ARTHRS KNE SURG W/MENISCECTOMY MED/LAT W/SHVG Left 11/19/2018    Procedure: ARTHROSCOPY KNEE PARTIAL  LATERAL MENISECTOMY;  Surgeon: Nikki Burgos MD;  Location:  MAIN OR;  Service: Orthopedics    TONSILLECTOMY      As kid    VASECTOMY  1978     Social History     Tobacco Use    Smoking status: Never    Smokeless tobacco: Never   Vaping Use    Vaping status: Never Used   Substance and Sexual Activity    Alcohol use: Yes     Comment: rarely    Drug use: No    Sexual activity: Not Currently     Partners: Female     Comment: Resides with Sukhwinder (Wife)     E-Cigarette/Vaping    E-Cigarette Use Never User      E-Cigarette/Vaping Substances     Family History   Problem Relation Age of Onset    Atrial fibrillation Mother     COPD Father         Deciced    Rheumatic fever Father     Glaucoma Father         Deciced    Alcohol abuse Father         Deciced    Thyroid disease Sister     Cancer Brother     Hypertension Son     Cancer Brother     Diverticulitis Son      Social History:  Marital Status: /Civil Union   Occupation: Unknown  Patient Pre-hospital Living Situation: Home  Patient Pre-hospital Level of Mobility: walks  Patient Pre-hospital Diet Restrictions: Regular    Meds/Allergies   I have reviewed home medications with patient personally.  Prior to Admission medications    Medication Sig Start Date End Date Taking? Authorizing Provider   amLODIPine (NORVASC) 5 mg tablet Take 1 tablet (5 mg total) by mouth daily 11/5/24   Judy Daniels DO    atorvastatin (LIPITOR) 40 mg tablet Take 1 tablet (40 mg total) by mouth daily 10/22/24   Judy Daniels DO   losartan (COZAAR) 100 MG tablet Take 1 tablet (100 mg total) by mouth daily 11/5/24   Judy Daniels DO     Allergies   Allergen Reactions    Lisinopril Cough       Objective :  Temp:  [98.2 °F (36.8 °C)-98.9 °F (37.2 °C)] 98.5 °F (36.9 °C)  HR:  [] 105  BP: (102-158)/(60-93) 136/79  Resp:  [16-20] 18  SpO2:  [92 %-95 %] 92 %  O2 Device: None (Room air)    Physical Exam  Vitals and nursing note reviewed.   Constitutional:       Appearance: Normal appearance.   HENT:      Head: Normocephalic.      Nose: Congestion present.   Eyes:      Extraocular Movements: Extraocular movements intact.      Pupils: Pupils are equal, round, and reactive to light.   Cardiovascular:      Rate and Rhythm: Normal rate and regular rhythm.      Heart sounds: No murmur heard.     No gallop.   Pulmonary:      Effort: No respiratory distress.      Breath sounds: Normal breath sounds. No wheezing.   Abdominal:      General: Bowel sounds are normal. There is no distension.      Tenderness: There is no abdominal tenderness.   Musculoskeletal:         General: Normal range of motion.      Cervical back: Normal range of motion.      Right lower leg: No edema.      Left lower leg: No edema.   Skin:     General: Skin is warm.   Neurological:      General: No focal deficit present.      Mental Status: He is alert and oriented to person, place, and time. Mental status is at baseline.   Psychiatric:         Mood and Affect: Mood normal.         Behavior: Behavior normal.         Thought Content: Thought content normal.          Lines/Drains:            Lab Results: I have reviewed the following results:  Results from last 7 days   Lab Units 01/21/25  1724   WBC Thousand/uL 6.38   HEMOGLOBIN g/dL 15.4   HEMATOCRIT % 46.6   PLATELETS Thousands/uL 162   SEGS PCT % 66   LYMPHO PCT % 18   MONO PCT % 14*   EOS PCT % 1      Results from last 7 days   Lab Units 01/21/25  1752   SODIUM mmol/L 137   POTASSIUM mmol/L 3.7   CHLORIDE mmol/L 103   CO2 mmol/L 26   BUN mg/dL 16   CREATININE mg/dL 0.98   ANION GAP mmol/L 8   CALCIUM mg/dL 8.5   ALBUMIN g/dL 4.0   TOTAL BILIRUBIN mg/dL 0.59   ALK PHOS U/L 45   ALT U/L 22   AST U/L 38   GLUCOSE RANDOM mg/dL 110     Results from last 7 days   Lab Units 01/21/25  1724   INR  1.00         Lab Results   Component Value Date    HGBA1C 5.5 08/22/2024    HGBA1C 5.7 (H) 02/21/2024    HGBA1C 5.5 08/10/2023     Results from last 7 days   Lab Units 01/21/25  1847 01/21/25  1724   LACTIC ACID mmol/L 0.7  --    PROCALCITONIN ng/ml  --  0.15       Imaging Results Review: I reviewed radiology reports from this admission including: CT chest and CT abdomen/pelvis.  Other Study Results Review: EKG was reviewed.     Administrative Statements   I have spent a total time of 50 minutes in caring for this patient on the day of the visit/encounter including Risks and benefits of tx options, Instructions for management, Patient and family education, Documenting in the medical record, and Reviewing / ordering tests, medicine, procedures  .    ** Please Note: This note has been constructed using a voice recognition system. **

## 2025-01-22 NOTE — PLAN OF CARE
Problem: Potential for Falls  Goal: Patient will remain free of falls  Description: INTERVENTIONS:  - Educate patient/family on patient safety including physical limitations  - Instruct patient to call for assistance with activity   - Consult OT/PT to assist with strengthening/mobility   - Keep Call bell within reach  - Keep bed low and locked with side rails adjusted as appropriate  - Keep care items and personal belongings within reach  - Initiate and maintain comfort rounds  - Make Fall Risk Sign visible to staff  - Offer Toileting every x Hours, in advance of need  - Initiate/Maintain xalarm  - Obtain necessary fall risk management equipment: x  - Apply yellow socks and bracelet for high fall risk patients  - Consider moving patient to room near nurses station  Outcome: Progressing     Problem: RESPIRATORY - ADULT  Goal: Achieves optimal ventilation and oxygenation  Description: INTERVENTIONS:  - Assess for changes in respiratory status  - Assess for changes in mentation and behavior  - Position to facilitate oxygenation and minimize respiratory effort  - Oxygen administered by appropriate delivery if ordered  - Initiate smoking cessation education as indicated  - Encourage broncho-pulmonary hygiene including cough, deep breathe, Incentive Spirometry  - Assess the need for suctioning and aspirate as needed  - Assess and instruct to report SOB or any respiratory difficulty  - Respiratory Therapy support as indicated  Outcome: Progressing     Problem: PAIN - ADULT  Goal: Verbalizes/displays adequate comfort level or baseline comfort level  Description: Interventions:  - Encourage patient to monitor pain and request assistance  - Assess pain using appropriate pain scale  - Administer analgesics based on type and severity of pain and evaluate response  - Implement non-pharmacological measures as appropriate and evaluate response  - Consider cultural and social influences on pain and pain management  - Notify  physician/advanced practitioner if interventions unsuccessful or patient reports new pain  Outcome: Progressing     Problem: INFECTION - ADULT  Goal: Absence or prevention of progression during hospitalization  Description: INTERVENTIONS:  - Assess and monitor for signs and symptoms of infection  - Monitor lab/diagnostic results  - Monitor all insertion sites, i.e. indwelling lines, tubes, and drains  - Monitor endotracheal if appropriate and nasal secretions for changes in amount and color  - [place] appropriate cooling/warming therapies per order  - Administer medications as ordered  - Instruct and encourage patient and family to use good hand hygiene technique  - Identify and instruct in appropriate isolation precautions for identified infection/condition  Outcome: Progressing  Goal: Absence of fever/infection during neutropenic period  Description: INTERVENTIONS:  - Monitor WBC    Outcome: Progressing     Problem: SAFETY ADULT  Goal: Patient will remain free of falls  Description: INTERVENTIONS:  - Educate patient/family on patient safety including physical limitations  - Instruct patient to call for assistance with activity   - Consult OT/PT to assist with strengthening/mobility   - Keep Call bell within reach  - Keep bed low and locked with side rails adjusted as appropriate  - Keep care items and personal belongings within reach  - Initiate and maintain comfort rounds  - Make Fall Risk Sign visible to staff  - Offer Toileting every x Hours, in advance of need  - Initiate/Maintain xalarm  - Obtain necessary fall risk management equipment: x  - Apply yellow socks and bracelet for high fall risk patients  - Consider moving patient to room near nurses station  Outcome: Progressing  Goal: Maintain or return to baseline ADL function  Description: INTERVENTIONS:  -  Assess patient's ability to carry out ADLs; assess patient's baseline for ADL function and identify physical deficits which impact ability to perform  ADLs (bathing, care of mouth/teeth, toileting, grooming, dressing, etc.)  - Assess/evaluate cause of self-care deficits   - Assess range of motion  - Assess patient's mobility; develop plan if impaired  - Assess patient's need for assistive devices and provide as appropriate  - Encourage maximum independence but intervene and supervise when necessary  - Involve family in performance of ADLs  - Assess for home care needs following discharge   - Consider OT consult to assist with ADL evaluation and planning for discharge  - Provide patient education as appropriate  Outcome: Progressing  Goal: Maintains/Returns to pre admission functional level  Description: INTERVENTIONS:  - Perform AM-PAC 6 Click Basic Mobility/ Daily Activity assessment daily.  - Set and communicate daily mobility goal to care team and patient/family/caregiver.   - Collaborate with rehabilitation services on mobility goals if consulted  - Perform Range of Motion x times a day.  - Reposition patient every x hours.  - Dangle patient x times a day  - Stand patient x times a day  - Ambulate patient x times a day  - Out of bed to chair x times a day   - Out of bed for meals x times a day  - Out of bed for toileting  - Record patient progress and toleration of activity level   Outcome: Progressing     Problem: DISCHARGE PLANNING  Goal: Discharge to home or other facility with appropriate resources  Description: INTERVENTIONS:  - Identify barriers to discharge w/patient and caregiver  - Arrange for needed discharge resources and transportation as appropriate  - Identify discharge learning needs (meds, wound care, etc.)  - Arrange for interpretive services to assist at discharge as needed  - Refer to Case Management Department for coordinating discharge planning if the patient needs post-hospital services based on physician/advanced practitioner order or complex needs related to functional status, cognitive ability, or social support system  Outcome:  Progressing     Problem: Knowledge Deficit  Goal: Patient/family/caregiver demonstrates understanding of disease process, treatment plan, medications, and discharge instructions  Description: Complete learning assessment and assess knowledge base.  Interventions:  - Provide teaching at level of understanding  - Provide teaching via preferred learning methods  Outcome: Progressing

## 2025-01-22 NOTE — UTILIZATION REVIEW
"Initial Clinical Review    Admission: Date/Time/Statement:   Admission Orders (From admission, onward)       Ordered        01/21/25 2014  Place in Observation  Once                          Orders Placed This Encounter   Procedures    Place in Observation     Standing Status:   Standing     Number of Occurrences:   1     Level of Care:   Med Surg [16]     ED Arrival Information       Expected   -    Arrival   1/21/2025 16:18    Acuity   Urgent              Means of arrival   Walk-In    Escorted by   Spouse    Service   Hospitalist    Admission type   Emergency              Arrival complaint   Seizure             Chief Complaint   Patient presents with    Medical Problem     Comes to ed c/o upper respiratory symptoms. Patients wife states she noticed patient was sitting in a recliner chair and was shaking. No bite danish on tongue.       Initial Presentation: 69 y.o. male to ED via walk-in from home  Present to ED with wife due to event PTA at home where patient was noted to have full \"full body shaking and his tongue was out\" that lasted for about 5 seconds during coughing episode. Wife unable to snap him out of it. One episode of emesis after episode. Since Friday with coughing, congestion, low grade temps (99), and poor oral intake.  Received dose of IV unasyn. Hold further abx at this time. Suspect viral etiology.   PMHX lipidemia, hypertension   Admitted to OBS with DX: AMS (altered mental status)   on exam: hypertensive  PLAN: tele monitoring; monitor labs; f/u blood cx       Anticipated Length of Stay/Certification Statement: Patient will be admitted on an observation basis with an anticipated length of stay of less than 2 midnights secondary to AMS.       ED Treatment-Medication Administration from 01/21/2025 1618 to 01/21/2025 2204         Date/Time Order Dose Route Action     01/21/2025 1848 iohexol (OMNIPAQUE) 350 MG/ML injection (MULTI-DOSE) 100 mL 100 mL Intravenous Given     01/21/2025 2005 " ampicillin-sulbactam (UNASYN) in sodium chloride 0.9% 3 g 3 g Intravenous Given            Scheduled Medications:  amLODIPine, 5 mg, Oral, Daily  atorvastatin, 40 mg, Oral, After Dinner  guaiFENesin, 600 mg, Oral, Q12H CHAPARRITA  heparin (porcine), 5,000 Units, Subcutaneous, Q8H CHAPARRITA  losartan, 100 mg, Oral, Daily  multi-electrolyte, 500 mL, Intravenous, Once  (recd 1/22)   multivitamin-minerals, 1 tablet, Oral, Daily    albumin human (FLEXBUMIN) 5 % injection 12.5 g  Dose: 12.5 g  Freq: Once Route: IV  Start: 01/22/25 0715 End: 01/22/25 0754    Continuous IV Infusions:        PRN Meds:  acetaminophen, 650 mg, Oral, Q6H PRN  benzonatate, 100 mg, Oral, TID PRN  ondansetron, 4 mg, Intravenous, Q6H PRN      ED Triage Vitals [01/21/25 1629]   Temperature Pulse Respirations Blood Pressure SpO2 Pain Score   98.2 °F (36.8 °C) 63 16 158/93 94 % No Pain     Weight (last 2 days)       Date/Time Weight    01/21/25 22:06:29 111 (245.2)    01/21/25 1629 109 (240)            Vital Signs (last 3 days)       Date/Time Temp Pulse Resp BP MAP (mmHg) SpO2 O2 Device Patient Position - Orthostatic VS [place] Coma Scale Score Pain    01/22/25 07:52:13 -- 71 -- 144/87 106 90 % -- Standing - Orthostatic VS -- --    01/22/25 07:50:53 -- 66 -- 145/88 107 90 % -- Sitting - Orthostatic VS -- --    01/22/25 07:49:05 -- 57 -- 138/79 99 92 % -- Lying - Orthostatic VS -- --    01/22/25 05:58:07 97.8 °F (36.6 °C) 61 -- 135/79 98 93 % -- -- -- --    01/22/25 02:51:48 -- -- 18 136/79 98 -- -- -- -- --    01/21/25 2230 -- -- -- -- -- -- -- -- 15 --    01/21/25 22:06:29 98.5 °F (36.9 °C) 105 18 136/79 98 92 % -- -- -- --    01/21/25 2130 -- 57 20 102/60 75 95 % -- -- -- --    01/21/25 2100 -- 56 20 139/74 98 94 % -- -- -- --    01/21/25 2041 -- -- -- -- -- -- -- -- -- No Pain    01/21/25 2000 -- 57 19 125/74 94 94 % -- -- -- --    01/21/25 1930 -- 55 17 128/70 93 92 % -- -- -- --    01/21/25 1900 -- 55 17 121/66 88 93 % -- -- -- --    01/21/25 1800 -- 56 18  142/85 -- 94 % -- -- -- --    01/21/25 1730 -- -- -- -- -- -- None (Room air) -- 15 No Pain    01/21/25 1700 98.9 °F (37.2 °C) -- -- -- -- -- -- -- -- --    01/21/25 1629 98.2 °F (36.8 °C) 63 16 158/93 -- 94 % None (Room air) Sitting -- No Pain              Pertinent Labs/Diagnostic Test Results:   Radiology:  CT chest abdomen pelvis w contrast   Final Interpretation by Kane Allen MD (01/21 1935)      Mild lingular opacities which may be infectious/inflammatory in etiology. Slightly enlarged hilar lymph nodes which may be reactive.      Colonic diverticulosis. Faint fatty infiltration in the region of a sigmoid diverticulum for which a mild uncomplicated diverticulitis is difficult to entirely exclude.      Few hepatic hyperdensities which may be perfusional in origin however nonemergent abdominal MRI with contrast is recommended for further evaluation.      A posterior right renal 1.6 cm lesion measures density higher than that of simple fluid. This can also be evaluated at time of abdominal MRI.      The study was marked in EPIC for immediate notification.               Workstation performed: [id number]         CT head without contrast   Final Interpretation by David Veras MD (01/21 1906)      No acute intracranial abnormality.                  Workstation performed: [id number]         XR chest portable   Final Interpretation by Bessie Jack MD (01/22 0550)      No acute cardiopulmonary disease.            Workstation performed: [id number]           Cardiology:  ECG 12 lead    by Interface, Ris Results In (01/21 1733)           Results from last 7 days   Lab Units 01/22/25  0536 01/21/25  1724   WBC Thousand/uL 5.13 6.38   HEMOGLOBIN g/dL 15.1 15.4   HEMATOCRIT % 45.3 46.6   PLATELETS Thousands/uL 164 162   TOTAL NEUT ABS Thousands/µL 3.01 4.26        Results from last 7 days   Lab Units 01/22/25  0536 01/21/25  1752   SODIUM mmol/L 136 137   POTASSIUM mmol/L 4.1 3.7   CHLORIDE mmol/L 104 103    CO2 mmol/L 24 26   ANION GAP mmol/L 8 8   BUN mg/dL 13 16   CREATININE mg/dL 0.97 0.98   EGFR ml/min/1.73sq m 78 77   CALCIUM mg/dL 8.4 8.5   MAGNESIUM mg/dL 2.0 1.9     Results from last 7 days   Lab Units 01/21/25  1752   AST U/L 38   ALT U/L 22   ALK PHOS U/L 45   TOTAL PROTEIN g/dL 7.0   ALBUMIN g/dL 4.0   TOTAL BILIRUBIN mg/dL 0.59        Results from last 7 days   Lab Units 01/22/25  0536 01/21/25  1752   GLUCOSE RANDOM mg/dL 102 110        Results from last 7 days   Lab Units 01/21/25  1920 01/21/25  1724   HS TNI 0HR ng/L  --  5   HS TNI 2HR ng/L 7  --    HSTNI D2 ng/L 2  --         Results from last 7 days   Lab Units 01/21/25  1724   PROTIME seconds 13.7   INR  1.00   PTT seconds 32        Results from last 7 days   Lab Units 01/22/25  0536 01/21/25  1724   PROCALCITONIN ng/ml 0.16 0.15     Results from last 7 days   Lab Units 01/21/25  1847   LACTIC ACID mmol/L 0.7        Results from last 7 days   Lab Units 01/21/25  1745   CLARITY UA  Clear   COLOR UA  Yellow   SPEC GRAV UA  >=1.030   PH UA  5.5   GLUCOSE UA mg/dl Negative   KETONES UA mg/dl Negative   BLOOD UA  Small*   PROTEIN UA mg/dl 30 (1+)*   NITRITE UA  Negative   BILIRUBIN UA  Negative   UROBILINOGEN UA (BE) mg/dl <2.0   LEUKOCYTES UA  Negative   WBC UA /hpf 0-1   RBC UA /hpf 1-2   BACTERIA UA /hpf Moderate*   EPITHELIAL CELLS WET PREP /hpf Occasional   MUCUS THREADS  Innumerable*        Results from last 7 days   Lab Units 01/21/25  1738 01/21/25  1724   BLOOD CULTURE  Received in Microbiology Lab. Culture in Progress. Received in Microbiology Lab. Culture in Progress.          Past Medical History:   Diagnosis Date    Colon polyp     Hypertension 2016     Present on Admission:   Primary hypertension   Dyslipidemia      Admitting Diagnosis: Cough [R05.9]  Diverticulitis [K57.92]  Seizure (HCC) [R56.9]  Syncope [R55]  Pneumonia [J18.9]  Altered mental status, unspecified altered mental status type [R41.82]  Age/Sex: 69 y.o. male    Network  Utilization Review Department  ATTENTION: Please call with any questions or concerns to [phone] and carefully listen to the prompts so that you are directed to the right person. All voicemails are confidential.   For Discharge needs, contact Care Management DC Support Team at [phone] opt. [phone]  Send all requests for admission clinical reviews, approved or denied determinations and any other requests to dedicated fax number below belonging to the campus where the patient is receiving treatment. List of dedicated fax numbers for the Facilities:  FACILITY NAME UR FAX NUMBER   ADMISSION DENIALS (Administrative/Medical Necessity) [phone]   DISCHARGE SUPPORT TEAM (NETWORK) [phone]   PARENT CHILD HEALTH (Maternity/NICU/Pediatrics) [phone]   [facility] [phone]   [facility] [phone]   [facility] [phone]   [facility] [phone]   [facility] [phone]   [facility] [phone]   [facility] [phone]   [facility] [phone]   [facility] [phone]   [facility] [phone]   [facility] [phone]   [facility] [phone]

## 2025-01-22 NOTE — ED PROVIDER NOTES
"Time reflects when diagnosis was documented in both MDM as applicable and the Disposition within this note       Time User Action Codes Description Comment    1/21/2025  8:12 PM Judy Acosta Add [R55] Syncope     1/21/2025  8:13 PM Flotereza Judy M Add [J18.9] Pneumonia     1/21/2025  8:13 PM Judy Acosta Add [K57.92] Diverticulitis     1/21/2025  8:13 PM Judy Acosta Add [R05.9] Cough     1/21/2025  9:23 PM Rosie Adan Modify [R55] Syncope     1/21/2025  9:23 PM Rosie Adan Add [R41.82] Altered mental status, unspecified altered mental status type     1/22/2025  2:49 PM Elvira Reno Add [R56.9] Seizure-like activity (HCC)           ED Disposition       ED Disposition   Admit    Condition   Stable    Date/Time   Tue Jan 21, 2025  8:12 PM    Comment   Case was discussed with Rosie and the patient's admission status was agreed to be Admission Status: observation status to the service of Dr. Reno .               Assessment & Plan       Medical Decision Making  DDx: pneumonia, influenza, appendicitis, syncope, dysrhythmia   Given reports of fever at home, cough, and now abdominal plan will order septic work, CT c/a/p. The description of  loss of consciousness with \"limb movement and tongue sticking out\" will CT head. Non-focal neuro exam.  Blood work: No leukocytosis, no anemia.  No SHANNA. No electrolyte abnormality. LFT WNL.  No elevated, no lactic acidosis. No sirs criteria met.   CT scan c/a/p findings appreciated. Antibiotic order placed to cover for both diverticulitis, and pneumonia. CT head wnl. Out of concern event described was syncope related, based on age, plan for admission. Discussed with LAZARO Velez and patient admitted.      Amount and/or Complexity of Data Reviewed  Labs: ordered.  Radiology: ordered.    Risk  Prescription drug management.  Decision regarding hospitalization.             Medications   iohexol (OMNIPAQUE) 350 MG/ML injection (MULTI-DOSE) 100 mL (100 mL Intravenous " Given 1/21/25 1848)   ampicillin-sulbactam (UNASYN) in sodium chloride 0.9% 3 g (3 g Intravenous Given 1/21/25 2005)       ED Risk Strat Scores   HEART Risk Score      Flowsheet Row Most Recent Value   Heart Score Risk Calculator    History 0 Filed at: 01/21/2025 1824   ECG 0 Filed at: 01/21/2025 1824   Age 2 Filed at: 01/21/2025 1824   Risk Factors 1 Filed at: 01/21/2025 1824   Troponin 0 Filed at: 01/21/2025 1824   HEART Score 3 Filed at: 01/21/2025 1824          HEART Risk Score      Flowsheet Row Most Recent Value   Heart Score Risk Calculator    History 0 Filed at: 01/21/2025 1824   ECG 0 Filed at: 01/21/2025 1824   Age 2 Filed at: 01/21/2025 1824   Risk Factors 1 Filed at: 01/21/2025 1824   Troponin 0 Filed at: 01/21/2025 1824   HEART Score 3 Filed at: 01/21/2025 1824                            SBIRT 20yo+      Flowsheet Row Most Recent Value   Initial Alcohol Screen: US AUDIT-C     1. How often do you have a drink containing alcohol? 0 Filed at: 01/21/2025 1657   2. How many drinks containing alcohol do you have on a typical day you are drinking?  0 Filed at: 01/21/2025 1657   3b. FEMALE Any Age, or MALE 65+: How often do you have 4 or more drinks on one occassion? 0 Filed at: 01/21/2025 1657   Audit-C Score 0 Filed at: 01/21/2025 1657   KAROLINA: How many times in the past year have you...    Used an illegal drug or used a prescription medication for non-medical reasons? Never Filed at: 01/21/2025 1657                            History of Present Illness       Chief Complaint   Patient presents with    Medical Problem     Comes to ed c/o upper respiratory symptoms. Patients wife states she noticed patient was sitting in a recliner chair and was shaking. No bite danish on tongue.       Past Medical History:   Diagnosis Date    Colon polyp     Hypertension 2016      Past Surgical History:   Procedure Laterality Date    COLONOSCOPY  01/18/2016    complete - serrted adenoma    CYST REMOVAL      DENTAL SURGERY  2017  "   Root canals due to cancer    INCISION AND DRAINAGE ABSCESS / HEMATOMA OF BURSA / KNEE / THIGH  02/09/2015    rigjt upper, inner arm abscess    PORTACATH PLACEMENT  2011    And removed      IA ARTHRS KNE SURG W/MENISCECTOMY MED/LAT W/SHVG Left 11/19/2018    Procedure: ARTHROSCOPY KNEE PARTIAL  LATERAL MENISECTOMY;  Surgeon: Nikki Burgos MD;  Location: [facility];  Service: Orthopedics    TONSILLECTOMY      As kid    VASECTOMY  1978      Family History   Problem Relation Age of Onset    Atrial fibrillation Mother     COPD Father         Deciced    Rheumatic fever Father     Glaucoma Father         Deciced    Alcohol abuse Father         Deciced    Thyroid disease Sister     Cancer Brother     Hypertension Son     Cancer Brother     Diverticulitis Son       Social History     Tobacco Use    Smoking status: Never    Smokeless tobacco: Never   Vaping Use    Vaping status: Never Used   Substance Use Topics    Alcohol use: Yes     Comment: rarely    Drug use: No      E-Cigarette/Vaping    E-Cigarette Use Never User       E-Cigarette/Vaping Substances      I have reviewed and agree with the history as documented.     Patient is a 69-year-old male no significant past medical history in for evaluation of cough, nausea, vomiting, abdominal pain.  Patient reports he started coughing on Friday has continued to cough.  Patient states that normally he bounces back to this time he is not.  Patient reports cough feels wet..  Denies any shortness of breath.  Patient's wife reports today he had an episode described as full body shaking, \"every limb,\" and 'his tongue was out, I thought he was goofing around.\" Patient states afterward he had no recollection of this event. Denies fall as he was in a recliner. States \"felt hot after\" has been taking tylenol and other OTC cold medications.         Review of Systems   Constitutional: Negative.    HENT: Negative.     Eyes: Negative.    Respiratory:  Positive for cough and chest tightness. "    Cardiovascular: Negative.    Gastrointestinal:  Positive for abdominal pain, nausea and vomiting.   Endocrine: Negative.    Genitourinary: Negative.    Musculoskeletal: Negative.    Skin: Negative.    Allergic/Immunologic: Negative.    Neurological: Negative.    Hematological: Negative.    Psychiatric/Behavioral: Negative.     All other systems reviewed and are negative.          Objective       ED Triage Vitals [01/21/25 1629]   Temperature Pulse Blood Pressure Respirations SpO2 Patient Position - Orthostatic VS   98.2 °F (36.8 °C) 63 158/93 16 94 % Sitting      Temp Source Heart Rate Source BP Location FiO2 (%) Pain Score    Oral Monitor Right arm -- No Pain      Vitals      Date and Time Temp Pulse SpO2 Resp BP Pain Score FACES Pain Rating User   01/22/25 1347 98.5 °F (36.9 °C) 76 93 % -- 128/76 -- -- DII   01/22/25 1347 98.5 °F (36.9 °C) 66 91 % -- 128/76 -- -- DII   01/22/25 1135 98.5 °F (36.9 °C) 61 94 % -- 125/79 -- -- DII   01/22/25 0800 -- -- -- -- -- No Pain -- MD   01/22/25 0752 -- 71 90 % -- 144/87 -- -- DII   01/22/25 0750 -- 66 90 % -- 145/88 -- -- DII   01/22/25 0749 -- 57 92 % -- 138/79 -- -- DII   01/22/25 0558 97.8 °F (36.6 °C) 61 93 % -- 135/79 -- -- DII   01/22/25 0251 -- -- -- 18 136/79 -- -- DII   01/21/25 2206 98.5 °F (36.9 °C) 105 92 % 18 136/79 -- -- DII   01/21/25 2130 -- 57 95 % 20 102/60 -- -- [place]   01/21/25 2100 -- 56 94 % 20 139/74 -- -- [place]   01/21/25 2041 -- -- -- -- -- No Pain -- RG   01/21/25 2000 -- 57 94 % 19 125/74 -- -- [place]   01/21/25 1930 -- 55 92 % 17 128/70 -- -- [place]   01/21/25 1900 -- 55 93 % 17 121/66 -- -- [place]   01/21/25 1800 -- 56 94 % 18 142/85 -- -- JOHAN   01/21/25 1730 -- -- -- -- -- No Pain -- JOHAN   01/21/25 1700 98.9 °F (37.2 °C) -- -- -- -- -- -- AMK   01/21/25 1629 98.2 °F (36.8 °C) 63 94 % 16 158/93 No Pain -- BY            Physical Exam  Vitals and nursing note reviewed.   Constitutional:       Appearance: Normal appearance. He is normal weight.   HENT:      Head:  Normocephalic.      Right Ear: External ear normal.      Left Ear: External ear normal.      Nose: Nose normal.      Mouth/Throat:      Mouth: Mucous membranes are moist.   Eyes:      Extraocular Movements: Extraocular movements intact.      Conjunctiva/sclera: Conjunctivae normal.      Pupils: Pupils are equal, round, and reactive to light.   Cardiovascular:      Rate and Rhythm: Normal rate and regular rhythm.      Pulses: Normal pulses.      Heart sounds: Normal heart sounds.   Pulmonary:      Effort: Pulmonary effort is normal. No respiratory distress.      Breath sounds: Normal breath sounds. No wheezing.   Abdominal:      General: Abdomen is flat. Bowel sounds are normal. There is no distension.      Palpations: Abdomen is soft.      Tenderness: There is abdominal tenderness.   Musculoskeletal:         General: Normal range of motion.      Cervical back: Normal range of motion and neck supple.   Skin:     General: Skin is warm.      Capillary Refill: Capillary refill takes less than 2 seconds.   Neurological:      General: No focal deficit present.      Mental Status: He is alert and oriented to person, place, and time. Mental status is at baseline.   Psychiatric:         Mood and Affect: Mood normal.         Behavior: Behavior normal.         Thought Content: Thought content normal.         Judgment: Judgment normal.         Results Reviewed       Procedure Component Value Units Date/Time    Blood culture #1 [[id number]] Collected: 01/21/25 1738    Lab Status: Preliminary result Specimen: Blood from Arm, Left Updated: 01/22/25 2101     Blood Culture No Growth at 24 hrs.    Blood culture #2 [[id number]] Collected: 01/21/25 1724    Lab Status: Preliminary result Specimen: Blood from Arm, Right Updated: 01/22/25 2101     Blood Culture No Growth at 24 hrs.    Magnesium [[id number]]  (Normal) Collected: 01/21/25 1752    Lab Status: Final result Specimen: Blood from Arm, Right Updated: 01/21/25 2239     Magnesium 1.9  mg/dL     HS Troponin I 2hr [[id number]]  (Normal) Collected: 01/21/25 1920    Lab Status: Final result Specimen: Blood from Arm, Right Updated: 01/21/25 1950     hs TnI 2hr 7 ng/L      Delta 2hr hsTnI 2 ng/L     Lactic acid [[id number]]  (Normal) Collected: 01/21/25 1847    Lab Status: Final result Specimen: Blood from Arm, Right Updated: 01/21/25 1910     LACTIC ACID 0.7 mmol/L     Narrative:      Result may be elevated if tourniquet was used during collection.    Urine Microscopic [[id number]]  (Abnormal) Collected: 01/21/25 1745    Lab Status: Final result Specimen: Urine, Clean Catch Updated: 01/21/25 1831     RBC, UA 1-2 /hpf      WBC, UA 0-1 /hpf      Epithelial Cells Occasional /hpf      Bacteria, UA Moderate /hpf      MUCUS THREADS Innumerable    UA w Reflex to Microscopic w Reflex to Culture [[id number]]  (Abnormal) Collected: 01/21/25 1745    Lab Status: Final result Specimen: Urine, Clean Catch Updated: 01/21/25 1831     Color, UA Yellow     Clarity, UA Clear     Specific Gravity, UA >=1.030     pH, UA 5.5     Leukocytes, UA Negative     Nitrite, UA Negative     Protein, UA 30 (1+) mg/dl      Glucose, UA Negative mg/dl      Ketones, UA Negative mg/dl      Urobilinogen, UA <2.0 mg/dl      Bilirubin, UA Negative     Occult Blood, UA Small    Comprehensive metabolic panel [[id number]] Collected: 01/21/25 1752    Lab Status: Final result Specimen: Blood from Arm, Right Updated: 01/21/25 1805     Sodium 137 mmol/L      Potassium 3.7 mmol/L      Chloride 103 mmol/L      CO2 26 mmol/L      ANION GAP 8 mmol/L      BUN 16 mg/dL      Creatinine 0.98 mg/dL      Glucose 110 mg/dL      Calcium 8.5 mg/dL      AST 38 U/L      ALT 22 U/L      Alkaline Phosphatase 45 U/L      Total Protein 7.0 g/dL      Albumin 4.0 g/dL      Total Bilirubin 0.59 mg/dL      eGFR 77 ml/min/1.73sq m     Narrative:      National Kid[employer] Disease Foundation guidelines for Chronic Kidney Disease (CKD):     Stage 1 with normal or high GFR (GFR > 90  mL/min/1.73 square meters)    Stage 2 Mild CKD (GFR = 60-89 mL/min/1.73 square meters)    Stage 3A Moderate CKD (GFR = 45-59 mL/min/1.73 square meters)    Stage 3B Moderate CKD (GFR = 30-44 mL/min/1.73 square meters)    Stage 4 Severe CKD (GFR = 15-29 mL/min/1.73 square meters)    Stage 5 End Stage CKD (GFR <15 mL/min/1.73 square meters)  Note: GFR calculation is accurate only with a steady state creatinine    FLU/COVID Rapid Antigen (30 min. TAT) - Preferred screening test in ED [[id number]]  (Normal) Collected: 01/21/25 1724    Lab Status: Final result Specimen: Nares from Nose Updated: 01/21/25 1803     SARS COV Rapid Antigen Negative     Influenza A Rapid Antigen Negative     Influenza B Rapid Antigen Negative    Narrative:      This test has been performed using the [employer] Regi 2 FLU+SARS Antigen test under the Emergency Use Authorization (EUA). This test has been validated by the  and verified by the performing laboratory. The Regi uses lateral flow immunofluorescent sandwich assay to detect SARS-COV, Influenza A and Influenza B Antigen.     The [employer] Regi 2 SARS Antigen test does not differentiate between SARS-CoV and SARS-CoV-2.     Negative results are presumptive and may be confirmed with a molecular assay, if necessary, for patient management. Negative results do not rule out SARS-CoV-2 or influenza infection and should not be used as the sole basis for treatment or patient management decisions. A negative test result may occur if the level of antigen in a sample is below the limit of detection of this test.     Positive results are indicative of the presence of viral antigens, but do not rule out bacterial infection or co-infection with other viruses.     All test results should be used as an adjunct to clinical observations and other information available to the provider.    FOR PEDIATRIC PATIENTS - copy/paste COVID Guidelines URL to browser:  [email or web address]    Procalcitonin [[id number]]  (Normal) Collected: 01/21/25 1724    Lab Status: Final result Specimen: Blood from Arm, Right Updated: 01/21/25 1759     Procalcitonin 0.15 ng/ml     Protime-INR [[id number]]  (Normal) Collected: 01/21/25 1724    Lab Status: Final result Specimen: Blood from Arm, Right Updated: 01/21/25 1759     Protime 13.7 seconds      INR 1.00    Narrative:      INR Therapeutic Range    Indication                                             INR Range      Atrial Fibrillation                                               2.0-3.0  Hypercoagulable State                                    2.0.2.3  Left Ventricular Asist Device                            2.0-3.0  Mechanical Heart Valve                                  -    Aortic(with afib, MI, embolism, HF, LA enlargement,    and/or coagulopathy)                                     2.0-3.0 (2.5-3.5)     Mitral                                                             2.5-3.5  Prosthetic/Bioprosthetic Heart Valve               2.0-3.0  Venous thromboembolism (VTE: VT, PE        2.0-3.0    APTT [[id number]]  (Normal) Collected: 01/21/25 1724    Lab Status: Final result Specimen: Blood from Arm, Right Updated: 01/21/25 1759     PTT 32 seconds     HS Troponin 0hr (reflex protocol) [[id number]]  (Normal) Collected: 01/21/25 1724    Lab Status: Final result Specimen: Blood from Arm, Right Updated: 01/21/25 1756     hs TnI 0hr 5 ng/L     CBC and differential [[id number]]  (Abnormal) Collected: 01/21/25 1724    Lab Status: Final result Specimen: Blood from Arm, Right Updated: 01/21/25 1733     WBC 6.38 Thousand/uL      RBC 5.17 Million/uL      Hemoglobin 15.4 g/dL      Hematocrit 46.6 %      MCV 90 fL      MCH 29.8 pg      MCHC 33.0 g/dL      RDW 13.6 %      MPV 10.1 fL      Platelets 162 Thousands/uL      nRBC 0 /100 WBCs      Segmented % 66 %      Immature Grans % 0 %      Lymphocytes %  18 %      Monocytes % 14 %      Eosinophils Relative 1 %      Basophils Relative 1 %      Absolute Neutrophils 4.26 Thousands/µL      Absolute Immature Grans 0.02 Thousand/uL      Absolute Lymphocytes 1.15 Thousands/µL      Absolute Monocytes 0.88 Thousand/µL      Eosinophils Absolute 0.03 Thousand/µL      Basophils Absolute 0.04 Thousands/µL             CT chest abdomen pelvis w contrast   Final Interpretation by Kane Allen MD (01/21 1935)      Mild lingular opacities which may be infectious/inflammatory in etiology. Slightly enlarged hilar lymph nodes which may be reactive.      Colonic diverticulosis. Faint fatty infiltration in the region of a sigmoid diverticulum for which a mild uncomplicated diverticulitis is difficult to entirely exclude.      Few hepatic hyperdensities which may be perfusional in origin however nonemergent abdominal MRI with contrast is recommended for further evaluation.      A posterior right renal 1.6 cm lesion measures density higher than that of simple fluid. This can also be evaluated at time of abdominal MRI.      The study was marked in EPIC for immediate notification.               Workstation performed: [id number]         CT head without contrast   Final Interpretation by David Veras MD (01/21 1906)      No acute intracranial abnormality.                  Workstation performed: [id number]         XR chest portable   Final Interpretation by Bessie Jack MD (01/22 0550)      No acute cardiopulmonary disease.            Workstation performed: [id number]             Procedures    ED Medication and Procedure Management   Prior to Admission Medications   Prescriptions Last Dose Informant Patient Reported? Taking?   amLODIPine (NORVASC) 5 mg tablet 1/20/2025  No Yes   Sig: Take 1 tablet (5 mg total) by mouth daily   atorvastatin (LIPITOR) 40 mg tablet 1/20/2025  No Yes   Sig: Take 1 tablet (40 mg total) by mouth daily   losartan (COZAAR) 100 MG tablet 1/20/2025  No  Yes   Sig: Take 1 tablet (100 mg total) by mouth daily   multivitamin (THERAGRAN) TABS 1/20/2025  Yes Yes   Sig: Take 1 tablet by mouth daily      Facility-Administered Medications: None     Discharge Medication List as of 1/22/2025  3:45 PM        START taking these medications    Details   benzonatate (TESSALON PERLES) 100 mg capsule Take 1 capsule (100 mg total) by mouth 3 (three) times a day as needed for cough, Starting Wed 1/22/2025, Normal      guaiFENesin (MUCINEX) 600 mg 12 hr tablet Take 1 tablet (600 mg total) by mouth every 12 (twelve) hours, Starting Wed 1/22/2025, Normal           CONTINUE these medications which have NOT CHANGED    Details   amLODIPine (NORVASC) 5 mg tablet Take 1 tablet (5 mg total) by mouth daily, Starting Tue 11/5/2024, Normal      atorvastatin (LIPITOR) 40 mg tablet Take 1 tablet (40 mg total) by mouth daily, Starting Tue 10/22/2024, Normal      losartan (COZAAR) 100 MG tablet Take 1 tablet (100 mg total) by mouth daily, Starting Tue 11/5/2024, Normal      multivitamin (THERAGRAN) TABS Take 1 tablet by mouth daily, Historical Med           Outpatient Discharge Orders   Ambulatory referral to Neurology   Standing Status: Future Standing Exp. Date: 01/22/26      Discharge Diet     Activity as tolerated     EEG Routine and awake   Standing Status: Future Standing Exp. Date: 01/22/26     ED SEPSIS DOCUMENTATION   Time reflects when diagnosis was documented in both MDM as applicable and the Disposition within this note       Time User Action Codes Description Comment    1/21/2025  8:12 PM Judy Acosta Add [R55] Syncope     1/21/2025  8:13 PM Judy Acosta [J18.9] Pneumonia     1/21/2025  8:13 PM Judy Acosta [K57.92] Diverticulitis     1/21/2025  8:13 PM Judy Acosta [R05.9] Cough     1/21/2025  9:23 PM Rosie Adan Modify [R55] Syncope     1/21/2025  9:23 PM Rosie Adan Add [R41.82] Altered mental status, unspecified altered mental status type      1/22/2025  2:49 PM Elvira Reno [R56.9] Seizure-like activity (HCC)                  YANETH Branch  01/23/25 1112

## 2025-01-22 NOTE — DISCHARGE SUMMARY
"Discharge Summary - Hospitalist   Name: Eduard Ferguson 69 y.o. male I MRN: [id number]  Unit/Bed#: -01 I Date of Admission: 1/21/2025   Date of Service: 1/22/2025 I Hospital Day: 0     Assessment & Plan  AMS (altered mental status)  Presented to the ER with wife due to event PTA at home where patient was noted to have full \"full body shaking and his tongue was out\" that lasted for about 5 seconds during coughing episode. Patient unable to stop shaking but can recall his wife shouting from the couch. Wife unable to snap him out of it.   Since Friday with coughing, congestion, low grade temps (99), and poor oral intake. Denies abdominal pain. One episode of emesis after episode. Denies diarrhea   No sirs criteria.   Lactic and procal WNL   BC collected   UA negative   CT chest abdomen pelvis w contrast   Mild lingular opacities which may be infectious/inflammatory in etiology. Slightly enlarged hilar lymph nodes which may be reactive.  Colonic diverticulosis. Faint fatty infiltration in the region of a sigmoid diverticulum for which a mild uncomplicated diverticulitis is difficult to entirely exclude.  Few hepatic hyperdensities which may be perfusional in origin however nonemergent abdominal MRI with contrast is recommended for further evaluation.  A posterior right renal 1.6 cm lesion measures density higher than that of simple fluid. This can also be evaluated at time of abdominal MRI.  Received dose of IV unasyn. Hold further abx at this time. Suspect viral etiology.     CT head unremarkable   Troponin 5,7,4 hr pending   2 hr delta 2  EKG without ischemic changes   Low suspicion for seizure etiology. Normal lactic/labs, patient without incontinence, no tongue biting.   Telemetry  Discussed with neuro, very unlikely to be seizure related most likely vasovagal episode 2/2 to URI  No further episode while admitted  Orthostatics negative    F/u with neuro outpt with outpt EEG if it were to recur again "   Dyslipidemia  Continue statin 40 mg daily   Primary hypertension  Home regimen   Losartan 100 mg daily   Amlodipine 5 mg daily      Medical Problems       Resolved Problems  Date Reviewed: 9/5/2024   None       Discharging Physician / Practitioner: Elvira Reno MD  PCP: Judy Daniels DO  Admission Date:   Admission Orders (From admission, onward)       Ordered        01/21/25 2014  Place in Observation  Once                          Discharge Date: 01/22/25    Consultations During Hospital Stay:  CM      Procedures Performed:   CT chest abdomen pelvis w contrast   Final Result      Mild lingular opacities which may be infectious/inflammatory in etiology. Slightly enlarged hilar lymph nodes which may be reactive.      Colonic diverticulosis. Faint fatty infiltration in the region of a sigmoid diverticulum for which a mild uncomplicated diverticulitis is difficult to entirely exclude.      Few hepatic hyperdensities which may be perfusional in origin however nonemergent abdominal MRI with contrast is recommended for further evaluation.      A posterior right renal 1.6 cm lesion measures density higher than that of simple fluid. This can also be evaluated at time of abdominal MRI.      The study was marked in EPIC for immediate notification.               Workstation performed: [id number]         CT head without contrast   Final Result      No acute intracranial abnormality.                  Workstation performed: [id number]         XR chest portable   Final Result      No acute cardiopulmonary disease.            Workstation performed: [id number]               Significant Findings / Test Results:   See above    Incidental Findings:   See above   I reviewed the above mentioned incidental findings with the patient and/or family and they expressed understanding.    Test Results Pending at Discharge (will require follow up):   none     Outpatient Tests Requested:  EEG outpt    Complications:  none know at  "this time    Reason for Admission: [facility]    Hospital Course:   Eduard Ferguson is a 69 y.o. male patient who originally presented to the hospital on 1/21/2025 due to full body shaking and protruding tongue that lasted about 5 seconds during a coughing episode.  Patient with URI symptoms.  Patient not meeting SIRS criteria with a negative lactic Pro-Dalton level as well as negative UA and negative flu and COVID.  Blood cultures were obtained.  Patient received a dose of antibiotics in the ED however due to this most likely being viral in nature antibiotics were held. Pt was given IVF.  Orthostatics were obtained and were negative.  Patient without further events.  Discussed with neurology unlikely to be seizure related due to URI symptoms and awareness of episode.  Recommending that he follow-up with his PCP and neurology outpatient with an EEG outpatient.  No changes to medications.  He has otherwise remained hemodynamically stable afebrile in no acute respiratory distress.  Patient has had no further episodes.  He has been medically cleared for discharge.    Hospital Course: No notes on file      Please see above list of diagnoses and related plan for additional information.     Condition at Discharge: stable    Discharge Day Visit / Exam:   Subjective:  Eduard was seen and examined at bedside.  No acute events overnight.  Discussed plan of care.  All questions and concerns were answered and addressed.  Has no acute complaints at this time.  Vitals: Blood Pressure: 128/76 (01/22/25 1347)  Pulse: 76 (01/22/25 1347)  Temperature: 98.5 °F (36.9 °C) (01/22/25 1347)  Temp Source: Oral (01/21/25 1700)  Respirations: 18 (01/22/25 0251)  Height: 5' 9\" (175.3 cm) (01/21/25 2206)  Weight - Scale: 111 kg (245 lb 3.2 oz) (01/21/25 2206)  SpO2: 93 % (01/22/25 1347)  Physical Exam  Vitals and nursing note reviewed.   Constitutional:       General: He is not in acute distress.     Appearance: He is not ill-appearing.   HENT:      " Head: Normocephalic and atraumatic.   Cardiovascular:      Rate and Rhythm: Normal rate and regular rhythm.      Pulses: Normal pulses.      Heart sounds: Normal heart sounds.   Pulmonary:      Effort: Pulmonary effort is normal.      Breath sounds: Normal breath sounds.   Abdominal:      General: Abdomen is flat. Bowel sounds are normal.      Palpations: Abdomen is soft.   Musculoskeletal:      Right lower leg: No edema.      Left lower leg: No edema.   Skin:     General: Skin is warm.   Neurological:      General: No focal deficit present.      Mental Status: He is alert and oriented to person, place, and time.          Discussion with Family: Updated  (wife) via phone.    Discharge instructions/Information to patient and family:   See after visit summary for information provided to patient and family.      Provisions for Follow-Up Care:  See after visit summary for information related to follow-up care and any pertinent home health orders.      Mobility at time of Discharge:   Basic Mobility Inpatient Raw Score: 21  JH-HLM Goal: 6: Walk 10 steps or more  JH-HLM Achieved: 6: Walk 10 steps or more  HLM Goal achieved. Continue to encourage appropriate mobility.     Disposition:   Home    Planned Readmission: no    Discharge Medications:  See after visit summary for reconciled discharge medications provided to patient and/or family.      Administrative Statements   Discharge Statement:  I have spent a total time of 40 minutes in caring for this patient on the day of the visit/encounter. >30 minutes of time was spent on: Diagnostic results, Prognosis, Risks and benefits of tx options, Instructions for management, Patient and family education, Importance of tx compliance, Risk factor reductions, Impressions, Counseling / Coordination of care, Documenting in the medical record, Reviewing / ordering tests, medicine, procedures  , and Communicating with other healthcare professionals .    **Please Note: This  note may have been constructed using a voice recognition system**

## 2025-01-22 NOTE — PLAN OF CARE
Problem: Potential for Falls  Goal: Patient will remain free of falls  Description: INTERVENTIONS:  - Educate patient/family on patient safety including physical limitations  - Instruct patient to call for assistance with activity   - Consult OT/PT to assist with strengthening/mobility   - Keep Call bell within reach  - Keep bed low and locked with side rails adjusted as appropriate  - Keep care items and personal belongings within reach  - Initiate and maintain comfort rounds  - Make Fall Risk Sign visible to staff  - Offer Toileting every 2 Hours, in advance of need  - Initiate/Maintain bed/chair alarm  - Obtain necessary fall risk management equipment  - Apply yellow socks and bracelet for high fall risk patients  - Consider moving patient to room near nurses station  Outcome: Progressing     Problem: RESPIRATORY - ADULT  Goal: Achieves optimal ventilation and oxygenation  Description: INTERVENTIONS:  - Assess for changes in respiratory status  - Assess for changes in mentation and behavior  - Position to facilitate oxygenation and minimize respiratory effort  - Oxygen administered by appropriate delivery if ordered  - Initiate smoking cessation education as indicated  - Encourage broncho-pulmonary hygiene including cough, deep breathe, Incentive Spirometry  - Assess the need for suctioning and aspirate as needed  - Assess and instruct to report SOB or any respiratory difficulty  - Respiratory Therapy support as indicated  Outcome: Progressing     Problem: PAIN - ADULT  Goal: Verbalizes/displays adequate comfort level or baseline comfort level  Description: Interventions:  - Encourage patient to monitor pain and request assistance  - Assess pain using appropriate pain scale  - Administer analgesics based on type and severity of pain and evaluate response  - Implement non-pharmacological measures as appropriate and evaluate response  - Consider cultural and social influences on pain and pain management  - Notify  physician/advanced practitioner if interventions unsuccessful or patient reports new pain  Outcome: Progressing     Problem: INFECTION - ADULT  Goal: Absence or prevention of progression during hospitalization  Description: INTERVENTIONS:  - Assess and monitor for signs and symptoms of infection  - Monitor lab/diagnostic results  - Monitor all insertion sites, i.e. indwelling lines, tubes, and drains  - Monitor endotracheal if appropriate and nasal secretions for changes in amount and color  - [place] appropriate cooling/warming therapies per order  - Administer medications as ordered  - Instruct and encourage patient and family to use good hand hygiene technique  - Identify and instruct in appropriate isolation precautions for identified infection/condition  Outcome: Progressing  Goal: Absence of fever/infection during neutropenic period  Description: INTERVENTIONS:  - Monitor WBC    Outcome: Progressing     Problem: SAFETY ADULT  Goal: Patient will remain free of falls  Description: INTERVENTIONS:  - Educate patient/family on patient safety including physical limitations  - Instruct patient to call for assistance with activity   - Consult OT/PT to assist with strengthening/mobility   - Keep Call bell within reach  - Keep bed low and locked with side rails adjusted as appropriate  - Keep care items and personal belongings within reach  - Initiate and maintain comfort rounds  - Make Fall Risk Sign visible to staff  - Offer Toileting every 2 Hours, in advance of need  - Initiate/Maintain bed/chair alarm  - Obtain necessary fall risk management equipment  - Apply yellow socks and bracelet for high fall risk patients  - Consider moving patient to room near nurses station  Outcome: Progressing  Goal: Maintain or return to baseline ADL function  Description: INTERVENTIONS:  -  Assess patient's ability to carry out ADLs; assess patient's baseline for ADL function and identify physical deficits which impact ability to  perform ADLs (bathing, care of mouth/teeth, toileting, grooming, dressing, etc.)  - Assess/evaluate cause of self-care deficits   - Assess range of motion  - Assess patient's mobility; develop plan if impaired  - Assess patient's need for assistive devices and provide as appropriate  - Encourage maximum independence but intervene and supervise when necessary  - Involve family in performance of ADLs  - Assess for home care needs following discharge   - Consider OT consult to assist with ADL evaluation and planning for discharge  - Provide patient education as appropriate  Outcome: Progressing  Goal: Maintains/Returns to pre admission functional level  Description: INTERVENTIONS:  - Perform AM-PAC 6 Click Basic Mobility/ Daily Activity assessment daily.  - Set and communicate daily mobility goal to care team and patient/family/caregiver.   - Collaborate with rehabilitation services on mobility goals if consulted  - Perform Range of Motion 3 times a day.  - Reposition patient every 2 hours.  - Dangle patient 3 times a day  - Stand patient 3 times a day  - Ambulate patient 3 times a day  - Out of bed to chair 3 times a day   - Out of bed for meals 3 times a day  - Out of bed for toileting  - Record patient progress and toleration of activity level   Outcome: Progressing     Problem: DISCHARGE PLANNING  Goal: Discharge to home or other facility with appropriate resources  Description: INTERVENTIONS:  - Identify barriers to discharge w/patient and caregiver  - Arrange for needed discharge resources and transportation as appropriate  - Identify discharge learning needs (meds, wound care, etc.)  - Arrange for interpretive services to assist at discharge as needed  - Refer to Case Management Department for coordinating discharge planning if the patient needs post-hospital services based on physician/advanced practitioner order or complex needs related to functional status, cognitive ability, or social support  system  Outcome: Progressing     Problem: Knowledge Deficit  Goal: Patient/family/caregiver demonstrates understanding of disease process, treatment plan, medications, and discharge instructions  Description: Complete learning assessment and assess knowledge base.  Interventions:  - Provide teaching at level of understanding  - Provide teaching via preferred learning methods  Outcome: Progressing

## 2025-01-24 LAB
ATRIAL RATE: 53 BPM
P AXIS: 7 DEGREES
PR INTERVAL: 186 MS
QRS AXIS: -67 DEGREES
QRSD INTERVAL: 86 MS
QT INTERVAL: 408 MS
QTC INTERVAL: 382 MS
T WAVE AXIS: 16 DEGREES
VENTRICULAR RATE: 53 BPM

## 2025-01-24 PROCEDURE — 93010 ELECTROCARDIOGRAM REPORT: CPT | Performed by: INTERNAL MEDICINE

## 2025-01-26 LAB
BACTERIA BLD CULT: NORMAL
BACTERIA BLD CULT: NORMAL

## 2025-01-28 ENCOUNTER — OFFICE VISIT (OUTPATIENT)
Dept: FAMILY MEDICINE CLINIC | Facility: HOSPITAL | Age: 70
End: 2025-01-28
Payer: COMMERCIAL

## 2025-01-28 VITALS
TEMPERATURE: 97.7 F | RESPIRATION RATE: 16 BRPM | DIASTOLIC BLOOD PRESSURE: 82 MMHG | HEART RATE: 68 BPM | WEIGHT: 245 LBS | SYSTOLIC BLOOD PRESSURE: 124 MMHG | HEIGHT: 69 IN | BODY MASS INDEX: 36.29 KG/M2 | OXYGEN SATURATION: 98 %

## 2025-01-28 DIAGNOSIS — I77.811 AORTIC ECTASIA, ABDOMINAL (HCC): ICD-10-CM

## 2025-01-28 DIAGNOSIS — J01.00 ACUTE MAXILLARY SINUSITIS, RECURRENCE NOT SPECIFIED: ICD-10-CM

## 2025-01-28 DIAGNOSIS — I10 PRIMARY HYPERTENSION: ICD-10-CM

## 2025-01-28 DIAGNOSIS — J18.9 PNEUMONIA OF LEFT LOWER LOBE DUE TO INFECTIOUS ORGANISM: ICD-10-CM

## 2025-01-28 DIAGNOSIS — R16.0 LIVER MASS: ICD-10-CM

## 2025-01-28 DIAGNOSIS — R55 VASOVAGAL SYNCOPE: Primary | ICD-10-CM

## 2025-01-28 DIAGNOSIS — E66.01 SEVERE OBESITY (BMI 35.0-39.9) WITH COMORBIDITY (HCC): ICD-10-CM

## 2025-01-28 PROCEDURE — 99496 TRANSJ CARE MGMT HIGH F2F 7D: CPT | Performed by: INTERNAL MEDICINE

## 2025-01-28 RX ORDER — FLUTICASONE PROPIONATE 50 MCG
2 SPRAY, SUSPENSION (ML) NASAL DAILY
Qty: 16 G | Refills: 0 | Status: SHIPPED | OUTPATIENT
Start: 2025-01-28 | End: 2025-02-11

## 2025-01-28 RX ORDER — DOXYCYCLINE 100 MG/1
100 CAPSULE ORAL 2 TIMES DAILY
Qty: 14 CAPSULE | Refills: 0 | Status: SHIPPED | OUTPATIENT
Start: 2025-01-28 | End: 2025-02-04

## 2025-01-28 NOTE — ASSESSMENT & PLAN NOTE
He has known mild ectasia of 2.9 cm of the abdominal aorta.  Abdomen was soft, I felt no pulsating masses today  Will monitor patient

## 2025-01-28 NOTE — ASSESSMENT & PLAN NOTE
Blood pressure is stable today.  Patient is not orthostatic.  Electrolytes were normal in the hospital and renal function was stable.  Continue amlodipine and losartan

## 2025-01-28 NOTE — PROGRESS NOTES
Assessment/Plan:     Vasovagal syncope  Patient presented for VINH management with wife today after hospital discharge on January 22.    Patient developed runny nose, sinus congestion, nasal congestion, cough on January 17.  He has had no fevers, no shortness of breath.    On January 22 as he was sitting in a recliner he had a coughing episode and after that it seemed to wife the patient was not responsive for about 10 seconds.  She was yelling at patient but he would not respond.  After about 10 seconds he came to and he had no postictal confusion he was his usual self.  He turned gray, started sweating profusely after the event.  Wife drove patient to the ER.  En route to the ER patient felt nauseated sweaty again and vomited once.    In the ER EKG showed sinus bradycardia with a heart 53 beats minute.  CT of the head showed no bleed, acute stroke, mass effect.  Electrolytes, blood sugar, liver function test were normal, kidney function test was at baseline.    Patient was kept for observation and was discharged.    Patient has had no recurrence of the unresponsive episode.  He denies chest pain, palpitations before the event.    Patient is not orthostatic today.  Neurologic examination is unremarkable    He most likely had a vasovagal episode following a coughing episode.  He had no postictal confusion, tongue biting, urinary or bowel incontinence.  I doubt that he had a seizure episode    I told patient and patient's wife that I believe that he did not need an EEG  Will monitor for recurrence      Acute maxillary sinusitis  He continues to have sinus congestion, postnasal drip, cough.  He had nasal congestion and clear nasal discharge.  Lungs were clear to auscultation without wheezing rhonchi or crackles.  He had no evidence of pulm overload.    Suspect he could have bacterial sinusitis since his symptoms have not abated over 10 days    Advised patient to use Flonase nasal spray and I gave him 7 days of  doxycycline    Pneumonia of left lower lobe due to infectious organism  CAT scan of the chest in the hospital showed mild lingual peribronchial vascular opacities.  Procalcitonin was negative in the hospital and patient had no leukocytosis.  He most likely had a viral left-sided pneumonia.  Lungs are clear to auscultation today, he is not hypoxic.  Will repeat chest x-ray to make sure he has no infiltrates in 4 weeks    Aortic ectasia, abdominal (HCC)  He has known mild ectasia of 2.9 cm of the abdominal aorta.  Abdomen was soft, I felt no pulsating masses today  Will monitor patient    Primary hypertension  Blood pressure is stable today.  Patient is not orthostatic.  Electrolytes were normal in the hospital and renal function was stable.  Continue amlodipine and losartan    Severe obesity (BMI 35.0-39.9) with comorbidity (HCC)  BMI is 36.18.  Patient is obesity with comorbidity of hypertension.  He will watch his diet.    Liver mass  CT abdomen and pelvis showed abnormal liver.  Patient denies any nausea, abdominal pain since hospital discharge.  LFTs were normal.  Abdomen soft and nontender today.  Radiologist recommended MRI of the abdomen for further evaluation  Patient has a history of non-Hodgkin's lymphoma  PET scan in 2023 showed no signs suggestive of lymphoma in the abdomen.  I ordered MRI of the abdomen     Diagnoses and all orders for this visit:    Vasovagal syncope    Pneumonia of left lower lobe due to infectious organism  -     Cancel: XR chest pa and lateral; Future  -     XR chest pa and lateral; Future    Acute maxillary sinusitis, recurrence not specified  -     doxycycline monohydrate (MONODOX) 100 mg capsule; Take 1 capsule (100 mg total) by mouth 2 (two) times a day for 7 days  -     fluticasone (FLONASE) 50 mcg/act nasal spray; 2 sprays into each nostril daily for 14 days    Severe obesity (BMI 35.0-39.9) with comorbidity (HCC)    Liver mass  -     Cancel: MRI abdomen w wo contrast;  "Future  -     MRI abdomen w wo contrast; Future    Aortic ectasia, abdominal (HCC)    Primary hypertension         Subjective:     Patient ID: Eduard Ferguson is a 69 y.o. male.    HPI    Review of Systems   Constitutional:  Negative for fever.   HENT:  Positive for congestion and postnasal drip.    Respiratory:  Positive for cough.    Cardiovascular:  Negative for chest pain.   Gastrointestinal:  Negative for abdominal pain and diarrhea.   Neurological:  Negative for weakness and numbness.         Objective:     Physical Exam  HENT:      Head: Normocephalic.      Nose: Congestion and rhinorrhea present.      Mouth/Throat:      Pharynx: No oropharyngeal exudate.   Eyes:      Conjunctiva/sclera: Conjunctivae normal.   Cardiovascular:      Rate and Rhythm: Normal rate and regular rhythm.      Heart sounds: No murmur heard.  Pulmonary:      Effort: No respiratory distress.      Breath sounds: No wheezing or rales.   Abdominal:      General: Bowel sounds are normal. There is no distension.      Palpations: Abdomen is soft. There is no mass.      Tenderness: There is no abdominal tenderness.   Musculoskeletal:      Cervical back: Neck supple.   Lymphadenopathy:      Cervical: No cervical adenopathy.   Skin:     General: Skin is warm and dry.   Neurological:      Mental Status: He is alert and oriented to person, place, and time.      Cranial Nerves: No cranial nerve deficit.      Motor: No weakness.      Gait: Gait normal.   Psychiatric:         Mood and Affect: Mood normal.         Thought Content: Thought content normal.           Vitals:    01/28/25 1041 01/28/25 1121 01/28/25 1122   BP: 140/76 120/74 124/82   BP Location:  Left arm Left arm   Patient Position:  Sitting Standing   Pulse: 62 68    Resp: 16 16    Temp: 97.7 °F (36.5 °C)     TempSrc: Tympanic     SpO2: 98%     Weight: 111 kg (245 lb)     Height: 5' 9\" (1.753 m)         Transitional Care Management Review:  Eduard Ferguson is a 69 y.o. male here for " TCM follow up.     During the TCM phone call patient stated:    TCM Call     Date and time call was made  1/22/2025  5:31 PM    Patient was hospitial[facility] at  [facility]    Date of Admission  01/21/25    Date of discharge  01/22/25    Diagnosis  alterned mental status    Disposition  Home    Were the patients medications reviewed and updated  Yes    Current Symptoms  Cough    Cough Severity  Severe      TCM Call     Post hospital issues  None    Should patient be enrolled in anticoag monitoring?  No    Scheduled for follow up?  Yes    Did you obtain your prescribed medications  Yes    Do you need help managing your prescriptions or medications  No    Is transportation to your appointment needed  No    I have advised the patient to call PCP with any new or worsening symptoms  Pavithra Friedman MA    Living Arrangements  Spouse or Significiant other              Gilmer Garcia MD

## 2025-01-28 NOTE — ASSESSMENT & PLAN NOTE
CAT scan of the chest in the hospital showed mild lingual peribronchial vascular opacities.  Procalcitonin was negative in the hospital and patient had no leukocytosis.  He most likely had a viral left-sided pneumonia.  Lungs are clear to auscultation today, he is not hypoxic.  Will repeat chest x-ray to make sure he has no infiltrates in 4 weeks

## 2025-01-28 NOTE — ASSESSMENT & PLAN NOTE
He continues to have sinus congestion, postnasal drip, cough.  He had nasal congestion and clear nasal discharge.  Lungs were clear to auscultation without wheezing rhonchi or crackles.  He had no evidence of pulm overload.    Suspect he could have bacterial sinusitis since his symptoms have not abated over 10 days    Advised patient to use Flonase nasal spray and I gave him 7 days of doxycycline

## 2025-01-28 NOTE — ASSESSMENT & PLAN NOTE
CT abdomen and pelvis showed abnormal liver.  Patient denies any nausea, abdominal pain since hospital discharge.  LFTs were normal.  Abdomen soft and nontender today.  Radiologist recommended MRI of the abdomen for further evaluation  Patient has a history of non-Hodgkin's lymphoma  PET scan in 2023 showed no signs suggestive of lymphoma in the abdomen.  I ordered MRI of the abdomen

## 2025-01-28 NOTE — ASSESSMENT & PLAN NOTE
Patient presented for VINH management with wife today after hospital discharge on January 22.    Patient developed runny nose, sinus congestion, nasal congestion, cough on January 17.  He has had no fevers, no shortness of breath.    On January 22 as he was sitting in a recliner he had a coughing episode and after that it seemed to wife the patient was not responsive for about 10 seconds.  She was yelling at patient but he would not respond.  After about 10 seconds he came to and he had no postictal confusion he was his usual self.  He turned gray, started sweating profusely after the event.  Wife drove patient to the ER.  En route to the ER patient felt nauseated sweaty again and vomited once.    In the ER EKG showed sinus bradycardia with a heart 53 beats minute.  CT of the head showed no bleed, acute stroke, mass effect.  Electrolytes, blood sugar, liver function test were normal, kidney function test was at baseline.    Patient was kept for observation and was discharged.    Patient has had no recurrence of the unresponsive episode.  He denies chest pain, palpitations before the event.    Patient is not orthostatic today.  Neurologic examination is unremarkable    He most likely had a vasovagal episode following a coughing episode.  He had no postictal confusion, tongue biting, urinary or bowel incontinence.  I doubt that he had a seizure episode    I told patient and patient's wife that I believe that he did not need an EEG  Will monitor for recurrence

## 2025-02-05 DIAGNOSIS — I10 ESSENTIAL HYPERTENSION: ICD-10-CM

## 2025-02-06 RX ORDER — LOSARTAN POTASSIUM 100 MG/1
100 TABLET ORAL DAILY
Qty: 90 TABLET | Refills: 0 | OUTPATIENT
Start: 2025-02-06

## 2025-02-06 RX ORDER — AMLODIPINE BESYLATE 5 MG/1
5 TABLET ORAL DAILY
Qty: 90 TABLET | Refills: 0 | OUTPATIENT
Start: 2025-02-06

## 2025-02-24 ENCOUNTER — HOSPITAL ENCOUNTER (OUTPATIENT)
Dept: MRI IMAGING | Facility: HOSPITAL | Age: 70
Discharge: HOME | End: 2025-02-24
Attending: INTERNAL MEDICINE
Payer: COMMERCIAL

## 2025-02-24 DIAGNOSIS — R16.0 LIVER MASS: ICD-10-CM

## 2025-02-24 PROCEDURE — 74183 MRI ABD W/O CNTR FLWD CNTR: CPT

## 2025-02-24 PROCEDURE — A9585 GADOBUTROL INJECTION: HCPCS | Performed by: INTERNAL MEDICINE

## 2025-02-24 RX ORDER — GADOBUTROL 604.72 MG/ML
11 INJECTION INTRAVENOUS
Status: COMPLETED | OUTPATIENT
Start: 2025-02-24 | End: 2025-02-24

## 2025-02-24 RX ADMIN — GADOBUTROL 11 ML: 604.72 INJECTION INTRAVENOUS at 07:52

## 2025-02-27 ENCOUNTER — RESULTS FOLLOW-UP (OUTPATIENT)
Dept: FAMILY MEDICINE CLINIC | Facility: HOSPITAL | Age: 70
End: 2025-02-27

## 2025-02-27 PROBLEM — J01.00 ACUTE MAXILLARY SINUSITIS: Status: RESOLVED | Noted: 2025-01-28 | Resolved: 2025-02-27

## 2025-02-27 PROBLEM — J18.9 PNEUMONIA OF LEFT LOWER LOBE DUE TO INFECTIOUS ORGANISM: Status: RESOLVED | Noted: 2025-01-28 | Resolved: 2025-02-27

## 2025-03-07 ENCOUNTER — OFFICE VISIT (OUTPATIENT)
Dept: FAMILY MEDICINE CLINIC | Facility: HOSPITAL | Age: 70
End: 2025-03-07
Payer: COMMERCIAL

## 2025-03-07 VITALS
OXYGEN SATURATION: 97 % | BODY MASS INDEX: 36.61 KG/M2 | SYSTOLIC BLOOD PRESSURE: 130 MMHG | TEMPERATURE: 97.1 F | HEART RATE: 75 BPM | HEIGHT: 69 IN | WEIGHT: 247.2 LBS | DIASTOLIC BLOOD PRESSURE: 86 MMHG

## 2025-03-07 DIAGNOSIS — I10 PRIMARY HYPERTENSION: ICD-10-CM

## 2025-03-07 DIAGNOSIS — R59.0 HILAR LYMPHADENOPATHY: ICD-10-CM

## 2025-03-07 DIAGNOSIS — N28.1 RENAL CYST, LEFT: Primary | ICD-10-CM

## 2025-03-07 DIAGNOSIS — E78.5 DYSLIPIDEMIA: ICD-10-CM

## 2025-03-07 DIAGNOSIS — E66.01 SEVERE OBESITY (BMI 35.0-39.9) WITH COMORBIDITY (HCC): ICD-10-CM

## 2025-03-07 DIAGNOSIS — Z00.00 MEDICARE ANNUAL WELLNESS VISIT, SUBSEQUENT: ICD-10-CM

## 2025-03-07 DIAGNOSIS — R73.9 HYPERGLYCEMIA: ICD-10-CM

## 2025-03-07 DIAGNOSIS — R16.0 LIVER MASS: ICD-10-CM

## 2025-03-07 PROBLEM — R41.82 AMS (ALTERED MENTAL STATUS): Status: RESOLVED | Noted: 2025-01-21 | Resolved: 2025-03-07

## 2025-03-07 PROBLEM — I77.810 THORACIC AORTIC ECTASIA (HCC): Status: ACTIVE | Noted: 2024-02-26

## 2025-03-07 PROCEDURE — G0439 PPPS, SUBSEQ VISIT: HCPCS | Performed by: INTERNAL MEDICINE

## 2025-03-07 PROCEDURE — 99214 OFFICE O/P EST MOD 30 MIN: CPT | Performed by: INTERNAL MEDICINE

## 2025-03-07 NOTE — ASSESSMENT & PLAN NOTE
BW not done prior to appt - new order given and urged to do, will follow and repeat in 6 mos + 2 wks - BW order given, encouraged healthy diet/regular exercise/wgt loss, will follow  Orders:    CBC and differential; Future    Comprehensive metabolic panel; Future    Hemoglobin A1C; Future    Lipid panel; Future    TSH, 3rd generation with Free T4 reflex; Future

## 2025-03-07 NOTE — PROGRESS NOTES
Name: Eduard Ferguson      : 1955      MRN: [id number]  Encounter Provider: Judy Daniels DO  Encounter Date: 3/7/2025   Encounter department: [facility]IMARY [facility] [address]     Assessment & Plan  Renal cyst, left  Pt reassured hemorrhagic cyst is benign, no further eval needed  Orders:    CBC and differential; Future    Comprehensive metabolic panel; Future    Hemoglobin A1C; Future    Lipid panel; Future    TSH, 3rd generation with Free T4 reflex; Future    Liver mass  MRI done and c/w bilobed hepatic cyst and hemangioma, reassured pt that these are benign, no further eval needed       Hilar lymphadenopathy  CT chest noted mildly enlarged R hilar and mildly enlarged L hilar lymph  nodes, pt with h/o lymphoma this should be followed - re-eval in 6 mos - WILL D/W PT IN DETAIL AT NEXT APPT       Primary hypertension  BP great at home and better on recheck at end of appt, con't current BP meds, encouraged healthy diet, regular formal exercise, wgt loss, recheck in 6 mos  Orders:    CBC and differential; Future    Comprehensive metabolic panel; Future    Hemoglobin A1C; Future    Lipid panel; Future    TSH, 3rd generation with Free T4 reflex; Future    Hyperglycemia  BW not done prior to appt - new order given and urged to do, will follow and repeat in 6 mos + 2 wks - BW order given, encouraged healthy diet/regular exercise/wgt loss, will follow  Orders:    CBC and differential; Future    Comprehensive metabolic panel; Future    Hemoglobin A1C; Future    Lipid panel; Future    TSH, 3rd generation with Free T4 reflex; Future    Dyslipidemia  FLP annually - order given for Sept, con't current statin, healthy diet/regular exercise/wgt loss encouraged, will follow  Orders:    CBC and differential; Future    Comprehensive metabolic panel; Future    Hemoglobin A1C; Future    Lipid panel; Future    TSH, 3rd generation with Free T4 reflex; Future    Medicare annual wellness visit,  "subsequent         Severe obesity (BMI 35.0-39.9) with comorbidity (HCC)  Healthy diet, regular formal exercise, healthy wgt encoruaged  Orders:    CBC and differential; Future    Comprehensive metabolic panel; Future    Hemoglobin A1C; Future    Lipid panel; Future    TSH, 3rd generation with Free T4 reflex; Future    BMI 36.0-36.9,adult           Depression Screening and Follow-up Plan: Patient was screened for depression during today's encounter. They screened negative with a PHQ-2 score of 0.        Preventive health issues were discussed with patient, and age appropriate screening tests were ordered as noted in patient's After Visit Summary. Personalized health advice and appropriate referrals for health education or preventive services given if needed, as noted in patient's After Visit Summary.    Colonoscopy 4/22 - 5 yrs    PSA 2/24 - urged to do PSA ordered for 2025    AAA US 9/24 - no aneurysm    CT chest 1/25 - ascending aortic ectasia    BW 8/24 (A1C 5.5)      History of Present Illness     HPI Pt here for follow up appt and AWV    Pt was admitted to [facility] in Jan for syncope related to coughing episode.  He saw Dr. Garcia for TCM. MRI of abd was ordered to f/u on 1.6 cm R renal lesion and liver noted on CT C/A/P done while IP. Thoracic aortic ectasia again noted on CT chest.  MRI was done 2/24/25 and was notable for liver hemangioma and cyst and a hemorrhagic cyst L posterior renal lesion that was noted on CT chest.     BP a bit above goal today and meds were reviewed and no changes have occurred.  He denies missing doses of meds or SE with the meds.  He does check his BP outside the office and reports he is usually 120's/70's at home.  he notes no frequent Ha's/dizziness/double vision/CP.     He did not do his fasting BW as previously ordered.  He admits his diet has been \"horrible\".  He is walking 30-45 min a day.  He is up 7 lbs from Sept 24.     Patient Care Team:  Judy Daniels DO as PCP - " General  Cb Bhandari MD (Dermatology)  Iggy Palacio MD (Ophthalmology)    Review of Systems   Constitutional:  Negative for chills, fever and unexpected weight change.   HENT:  Positive for hearing loss. Negative for congestion and trouble swallowing.    Eyes:  Negative for pain and visual disturbance.   Respiratory:  Negative for cough and shortness of breath.    Cardiovascular:  Negative for chest pain, palpitations and leg swelling.   Gastrointestinal:  Negative for abdominal pain, blood in stool, constipation, diarrhea, nausea and vomiting.   Genitourinary:  Negative for difficulty urinating, dysuria and hematuria.   Musculoskeletal:  Positive for arthralgias. Negative for gait problem.   Skin:  Negative for rash and wound.   Neurological:  Negative for dizziness and headaches.   Hematological:  Does not bruise/bleed easily.   Psychiatric/Behavioral:  Negative for confusion and dysphoric mood.      Medical History Reviewed by provider this encounter:       Annual Wellness Visit Questionnaire   Eduard is here for his Subsequent Wellness visit. Last Medicare Wellness visit information reviewed, patient interviewed and updates made to the record today.      Health Risk Assessment:   Patient rates overall health as good. Patient feels that their physical health rating is slightly worse. Patient is satisfied with their life. Eyesight was rated as same. Hearing was rated as slightly better. Patient feels that their emotional and mental health rating is same. Patients states they are never, rarely angry. Patient states they are sometimes unusually tired/fatigued. Pain experienced in the last 7 days has been some. Patient's pain rating has been 4/10. Patient states that he has experienced no weight loss or gain in last 6 months. Syncopal event in [place] really scared him and he feels his health has suffered d/t that  Hearing better with B/L hearing aids - just got them 3 wks ago    Depression Screening:   PHQ-2  Score: 0      Fall Risk Screening:   In the past year, patient has experienced: no history of falling in past year      Home Safety:  Patient does not have trouble with stairs inside or outside of their home. Patient has working smoke alarms and has working carbon monoxide detector. Home safety hazards include: none.     Nutrition:   Current diet is Regular and Frequent junk food.     Medications:   Patient is currently taking over-the-counter supplements. OTC medications include: see medication list. Patient is able to manage medications.     Activities of Daily Living (ADLs)/Instrumental Activities of Daily Living (IADLs):   Walk and transfer into and out of bed and chair?: Yes  Dress and groom yourself?: Yes    Bathe or shower yourself?: Yes    Feed yourself? Yes  Do your laundry/housekeeping?: Yes  Manage your money, pay your bills and track your expenses?: Yes  Make your own meals?: Yes    Do your own shopping?: Yes    Previous Hospitalizations:   Any hospitalizations or ED visits within the last 12 months?: Yes    How many hospitalizations have you had in the last year?: 1-2    Advance Care Planning:   Living will: Yes    Durable POA for healthcare: Yes    Advanced directive: Yes      Cognitive Screening:   Provider or family/friend/caregiver concerned regarding cognition?: No    PREVENTIVE SCREENINGS      Cardiovascular Screening:    General: Screening Current, Risks and Benefits Discussed and History Lipid Disorder      Diabetes Screening:     General: Screening Current and Risks and Benefits Discussed      Colorectal Cancer Screening:     General: Screening Current and Risks and Benefits Discussed      Prostate Cancer Screening:    General: Risks and Benefits Discussed    Due for: PSA      Osteoporosis Screening:    General: Risks and Benefits Discussed and Screening Not Indicated      Abdominal Aortic Aneurysm (AAA) Screening:    Risk factors include: age between 65-76 yo        General: Risks and Benefits  Discussed and Screening Current      Lung Cancer Screening:     General: Screening Not Indicated and Risks and Benefits Discussed      Hepatitis C Screening:    General: Screening Current and Risks and Benefits Discussed    Screening, Brief Intervention, and Referral to Treatment (SBIRT)     Screening  Typical number of drinks in a day: 0  Typical number of drinks in a week: 1  Interpretation: Low risk drinking behavior.    AUDIT-C Screenin) How often did you have a drink containing alcohol in the past year? monthly or less  2) How many drinks did you have on a typical day when you were drinking in the past year? 0  3) How often did you have 6 or more drinks on one occasion in the past year? never    AUDIT-C Score: 1  Interpretation: Score 0-3 (male): Negative screen for alcohol misuse    Single Item Drug Screening:  How often have you used an illegal drug (including marijuana) or a prescription medication for non-medical reasons in the past year? never    Single Item Drug Screen Score: 0  Interpretation: Negative screen for possible drug use disorder    Social Drivers of Health     Financial Resource Strain: Low Risk  (2024)    Overall Financial Resource Strain (CARDIA)     Difficulty of Paying Living Expenses: Not hard at all   Food Insecurity: No Food Insecurity (3/6/2025)    Hunger Vital Sign     Worried About Running Out of Food in the Last Year: Never true     Ran Out of Food in the Last Year: Never true   Transportation Needs: No Transportation Needs (3/6/2025)    PRAPARE - Transportation     Lack of Transportation (Medical): No     Lack of Transportation (Non-Medical): No   Housing Stability: Low Risk  (3/6/2025)    Housing Stability Vital Sign     Unable to Pay for Housing in the Last Year: No     Number of Times Moved in the Last Year: 0     Homeless in the Last Year: No   Utilities: Not At Risk (3/6/2025)    [facility] Utilities     Threatened with loss of utilities: No     Vision Screening    Right  "eye Left eye Both eyes   Without correction      With correction 20/20 20/50 20/20       Objective   /86   Pulse 75   Temp (!) 97.1 °F (36.2 °C)   Ht 5' 9\" (1.753 m)   Wt 112 kg (247 lb 3.2 oz)   SpO2 97%   BMI 36.51 kg/m²     Physical Exam  Vitals and nursing note reviewed.   Constitutional:       General: He is not in acute distress.     Appearance: He is well-developed. He is obese. He is not ill-appearing.   HENT:      Head: Normocephalic and atraumatic.      Right Ear: Tympanic membrane and external ear normal. There is no impacted cerumen.      Left Ear: Tympanic membrane and external ear normal. There is no impacted cerumen.      Ears:      Comments: B/L hearing aids removed for otoscopic exam  Eyes:      General:         Right eye: No discharge.         Left eye: No discharge.      Conjunctiva/sclera: Conjunctivae normal.   Neck:      Vascular: No carotid bruit.      Trachea: No tracheal deviation.   Cardiovascular:      Rate and Rhythm: Normal rate and regular rhythm.      Heart sounds: Normal heart sounds. No murmur heard.  Pulmonary:      Effort: Pulmonary effort is normal. No respiratory distress.      Breath sounds: Normal breath sounds. No wheezing, rhonchi or rales.   Abdominal:      General: There is no distension.      Palpations: Abdomen is soft.      Tenderness: There is no abdominal tenderness. There is no guarding or rebound.   Musculoskeletal:      Cervical back: Neck supple.      Right lower leg: No edema.      Left lower leg: No edema.   Lymphadenopathy:      Cervical: No cervical adenopathy.   Skin:     General: Skin is warm and dry.      Coloration: Skin is not pale.      Findings: No rash.   Neurological:      General: No focal deficit present.      Mental Status: He is alert. Mental status is at baseline.      Motor: No abnormal muscle tone.      Gait: Gait normal.   Psychiatric:         Mood and Affect: Mood normal.         Behavior: Behavior normal.         Thought Content: " Thought content normal.         Judgment: Judgment normal.

## 2025-03-07 NOTE — ASSESSMENT & PLAN NOTE
BP great at home and better on recheck at end of appt, con't current BP meds, encouraged healthy diet, regular formal exercise, wgt loss, recheck in 6 mos  Orders:    CBC and differential; Future    Comprehensive metabolic panel; Future    Hemoglobin A1C; Future    Lipid panel; Future    TSH, 3rd generation with Free T4 reflex; Future

## 2025-03-07 NOTE — ASSESSMENT & PLAN NOTE
FLP annually - order given for Sept, con't current statin, healthy diet/regular exercise/wgt loss encouraged, will follow  Orders:    CBC and differential; Future    Comprehensive metabolic panel; Future    Hemoglobin A1C; Future    Lipid panel; Future    TSH, 3rd generation with Free T4 reflex; Future

## 2025-03-07 NOTE — PATIENT INSTRUCTIONS
Medicare Preventive Visit Patient Instructions  Thank you for completing your Welcome to Medicare Visit or Medicare Annual Wellness Visit today. Your next wellness visit will be due in one year (3/8/2026).  The screening/preventive services that you may require over the next 5-10 years are detailed below. Some tests may not apply to you based off risk factors and/or age. Screening tests ordered at today's visit but not completed yet may show as past due. Also, please note that scanned in results may not display below.  Preventive Screenings:  Service Recommendations Previous Testing/Comments   Colorectal Cancer Screening  Colonoscopy    Fecal Occult Blood Test (FOBT)/Fecal Immunochemical Test (FIT)  Fecal DNA/Cologuard Test  Flexible Sigmoidoscopy Age: 45-75 years old   Colonoscopy: every 10 years (May be performed more frequently if at higher risk)  OR  FOBT/FIT: every 1 year  OR  Cologuard: every 3 years  OR  Sigmoidoscopy: every 5 years  Screening may be recommended earlier than age 45 if at higher risk for colorectal cancer. Also, an individualized decision between you and your healthcare provider will decide whether screening between the ages of 76-85 would be appropriate. Colonoscopy: 04/04/2022  FOBT/FIT: Not on file  Cologuard: Not on file  Sigmoidoscopy: Not on file    Screening Current     Prostate Cancer Screening Individualized decision between patient and health care provider in men between ages of 55-69   Medicare will cover every 12 months beginning on the day after your 50th birthday PSA: 0.5 ng/mL           Hepatitis C Screening Once for adults born between 1945 and 1965  More frequently in patients at high risk for Hepatitis C Hep C Antibody: 08/10/2023    Screening Current   Diabetes Screening 1-2 times per year if you're at risk for diabetes or have pre-diabetes Fasting glucose: 105 mg/dL (6/9/2020)  A1C: 5.5 % (8/22/2024)  Screening Current   Cholesterol Screening Once every 5 years if you don't  have a lipid disorder. May order more often based on risk factors. Lipid panel: 08/22/2024  Screening Current      Other Preventive Screenings Covered by Medicare:  Abdominal Aortic Aneurysm (AAA) Screening: covered once if your at risk. You're considered to be at risk if you have a family history of AAA or a male between the age of 65-75 who smoking at least 100 cigarettes in your lifetime.  Lung Cancer Screening: covers low dose CT scan once per year if you meet all of the following conditions: (1) Age 55-77; (2) No signs or symptoms of lung cancer; (3) Current smoker or have quit smoking within the last 15 years; (4) You have a tobacco smoking history of at least 20 pack years (packs per day x number of years you smoked); (5) You get a written order from a healthcare provider.  Glaucoma Screening: covered annually if you're considered high risk: (1) You have diabetes OR (2) Family history of glaucoma OR (3)  aged 50 and older OR (4)  American aged 65 and older  Osteoporosis Screening: covered every 2 years if you meet one of the following conditions: (1) Have a vertebral abnormality; (2) On glucocorticoid therapy for more than 3 months; (3) Have primary hyperparathyroidism; (4) On osteoporosis medications and need to assess response to drug therapy.  HIV Screening: covered annually if you're between the age of 15-65. Also covered annually if you are younger than 15 and older than 65 with risk factors for HIV infection. For pregnant patients, it is covered up to 3 times per pregnancy.    Immunizations:  Immunization Recommendations   Influenza Vaccine Annual influenza vaccination during flu season is recommended for all persons aged >= 6 months who do not have contraindications   Pneumococcal Vaccine   * Pneumococcal conjugate vaccine = PCV13 (Prevnar 13), PCV15 (Vaxneuvance), PCV20 (Prevnar 20)  * Pneumococcal polysaccharide vaccine = PPSV23 (Pneumovax) Adults 19-65 yo with certain risk  factors or if 65+ yo  If never received any pneumonia vaccine: recommend Prevnar 20 (PCV20)  Give PCV20 if previously received 1 dose of PCV13 or PPSV23   Hepatitis B Vaccine 3 dose series if at intermediate or high risk (ex: diabetes, end stage renal disease, liver disease)   Respiratory syncytial virus (RSV) Vaccine - COVERED BY MEDICARE PART D  * RSVPreF3 (Arexvy) CDC recommends that adults 60 years of age and older may receive a single dose of RSV vaccine using shared clinical decision-making (SCDM)   Tetanus (Td) Vaccine - COST NOT COVERED BY MEDICARE PART B Following completion of primary series, a booster dose should be given every 10 years to maintain immunity against tetanus. Td may also be given as tetanus wound prophylaxis.   Tdap Vaccine - COST NOT COVERED BY MEDICARE PART B Recommended at least once for all adults. For pregnant patients, recommended with each pregnancy.   Shingles Vaccine (Shingrix) - COST NOT COVERED BY MEDICARE PART B  2 shot series recommended in those 19 years and older who have or will have weakened immune systems or those 50 years and older     Health Maintenance Due:      Topic Date Due   • Colorectal Cancer Screening  04/03/2027   • Hepatitis C Screening  Completed     Immunizations Due:      Topic Date Due   • COVID-19 Vaccine (4 - 2024-25 season) 09/01/2024     Advance Directives   What are advance directives?  Advance directives are legal documents that state your wishes and plans for medical care. These plans are made ahead of time in case you lose your ability to make decisions for yourself. Advance directives can apply to any medical decision, such as the treatments you want, and if you want to donate organs.   What are the types of advance directives?  There are many types of advance directives, and each state has rules about how to use them. You may choose a combination of any of the following:  Living will:  This is a written record of the treatment you want. You can  also choose which treatments you do not want, which to limit, and which to stop at a certain time. This includes surgery, medicine, IV fluid, and tube feedings.   Durable power of  for healthcare (DPAHC):  This is a written record that states who you want to make healthcare choices for you when you are unable to make them for yourself. This person, called a proxy, is usually a family member or a friend. You may choose more than 1 proxy.  Do not resuscitate (DNR) order:  A DNR order is used in case your heart stops beating or you stop breathing. It is a request not to have certain forms of treatment, such as CPR. A DNR order may be included in other types of advance directives.  Medical directive:  This covers the care that you want if you are in a coma, near death, or unable to make decisions for yourself. You can list the treatments you want for each condition. Treatment may include pain medicine, surgery, blood transfusions, dialysis, IV or tube feedings, and a ventilator (breathing machine).  Values history:  This document has questions about your views, beliefs, and how you feel and think about life. This information can help others choose the care that you would choose.  Why are advance directives important?  An advance directive helps you control your care. Although spoken wishes may be used, it is better to have your wishes written down. Spoken wishes can be misunderstood, or not followed. Treatments may be given even if you do not want them. An advance directive may make it easier for your family to make difficult choices about your care.   Weight Management   Why it is important to manage your weight:  Being overweight increases your risk of health conditions such as heart disease, high blood pressure, type 2 diabetes, and certain types of cancer. It can also increase your risk for osteoarthritis, sleep apnea, and other respiratory problems. Aim for a slow, steady weight loss. Even a small amount of  weight loss can lower your risk of health problems.  How to lose weight safely:  A safe and healthy way to lose weight is to eat fewer calories and get regular exercise. You can lose up about 1 pound a week by decreasing the number of calories you eat by 500 calories each day.   Healthy meal plan for weight management:  A healthy meal plan includes a variety of foods, contains fewer calories, and helps you stay healthy. A healthy meal plan includes the following:  Eat whole-grain foods more often.  A healthy meal plan should contain fiber. Fiber is the part of grains, fruits, and vegetables that is not broken down by your body. Whole-grain foods are healthy and provide extra fiber in your diet. Some examples of whole-grain foods are whole-wheat breads and pastas, oatmeal, brown rice, and bulgur.  Eat a variety of vegetables every day.  Include dark, leafy greens such as spinach, kale, justina greens, and mustard greens. Eat yellow and orange vegetables such as carrots, sweet potatoes, and winter squash.   Eat a variety of fruits every day.  Choose fresh or canned fruit (canned in its own juice or light syrup) instead of juice. Fruit juice has very little or no fiber.  Eat low-fat dairy foods.  Drink fat-free (skim) milk or 1% milk. Eat fat-free yogurt and low-fat cottage cheese. Try low-fat cheeses such as mozzarella and other reduced-fat cheeses.  Choose meat and other protein foods that are low in fat.  Choose beans or other legumes such as split peas or lentils. Choose fish, skinless poultry (chicken or turkey), or lean cuts of red meat (beef or pork). Before you cook meat or poultry, cut off any visible fat.   Use less fat and oil.  Try baking foods instead of frying them. Add less fat, such as margarine, sour cream, regular salad dressing and mayonnaise to foods. Eat fewer high-fat foods. Some examples of high-fat foods include french fries, doughnuts, ice cream, and cakes.  Eat fewer sweets.  Limit foods and  drinks that are high in sugar. This includes candy, cookies, regular soda, and sweetened drinks.  Exercise:  Exercise at least 30 minutes per day on most days of the week. Some examples of exercise include walking, biking, dancing, and swimming. You can also fit in more physical activity by taking the stairs instead of the elevator or parking farther away from stores. Ask your healthcare provider about the best exercise plan for you.      © Copyright [employer] 2018 Information is for End User's use only and may not be sold, redistributed or otherwise used for commercial purposes. All illustrations and images included in CareNotes® are the copyrighted property of [employer] or [employer]

## 2025-03-07 NOTE — ASSESSMENT & PLAN NOTE
Healthy diet, regular formal exercise, healthy wgt encoruaged  Orders:    CBC and differential; Future    Comprehensive metabolic panel; Future    Hemoglobin A1C; Future    Lipid panel; Future    TSH, 3rd generation with Free T4 reflex; Future

## 2025-03-07 NOTE — ASSESSMENT & PLAN NOTE
MRI done and c/w bilobed hepatic cyst and hemangioma, reassured pt that these are benign, no further eval needed

## 2025-03-13 ENCOUNTER — RESULTS FOLLOW-UP (OUTPATIENT)
Dept: FAMILY MEDICINE CLINIC | Facility: HOSPITAL | Age: 70
End: 2025-03-13

## 2025-03-13 LAB
EST. AVERAGE GLUCOSE BLD GHB EST-MCNC: 117 MG/DL
GLUCOSE SERPL-MCNC: 105 MG/DL (ref 70–99)
HBA1C MFR BLD: 5.7 % (ref 4.8–5.6)
PSA SERPL-MCNC: 0.6 NG/ML (ref 0–4)
SL AMB REFLEX CRITERIA: NORMAL

## 2025-04-18 DIAGNOSIS — E78.5 DYSLIPIDEMIA: ICD-10-CM

## 2025-04-18 RX ORDER — ATORVASTATIN CALCIUM 40 MG/1
40 TABLET, FILM COATED ORAL DAILY
Qty: 90 TABLET | Refills: 1 | Status: SHIPPED | OUTPATIENT
Start: 2025-04-18

## 2025-05-03 DIAGNOSIS — I10 ESSENTIAL HYPERTENSION: ICD-10-CM

## 2025-05-03 RX ORDER — LOSARTAN POTASSIUM 100 MG/1
100 TABLET ORAL DAILY
Qty: 90 TABLET | Refills: 1 | Status: SHIPPED | OUTPATIENT
Start: 2025-05-03

## 2025-05-06 ENCOUNTER — OFFICE VISIT (OUTPATIENT)
Dept: FAMILY MEDICINE CLINIC | Facility: HOSPITAL | Age: 70
End: 2025-05-06
Payer: COMMERCIAL

## 2025-05-06 VITALS
SYSTOLIC BLOOD PRESSURE: 136 MMHG | OXYGEN SATURATION: 98 % | WEIGHT: 247 LBS | TEMPERATURE: 97 F | BODY MASS INDEX: 36.58 KG/M2 | DIASTOLIC BLOOD PRESSURE: 80 MMHG | HEART RATE: 74 BPM | HEIGHT: 69 IN

## 2025-05-06 DIAGNOSIS — L02.212 ABSCESS OF BACK: Primary | ICD-10-CM

## 2025-05-06 PROCEDURE — 99213 OFFICE O/P EST LOW 20 MIN: CPT | Performed by: NURSE PRACTITIONER

## 2025-05-06 RX ORDER — CEPHALEXIN 500 MG/1
500 CAPSULE ORAL EVERY 6 HOURS SCHEDULED
Qty: 28 CAPSULE | Refills: 0 | Status: SHIPPED | OUTPATIENT
Start: 2025-05-06 | End: 2025-05-13

## 2025-05-06 NOTE — PROGRESS NOTES
"Name: Eduard Ferguson      : 1955      MRN: [id number]  Encounter Provider: YANETH Dolan  Encounter Date: 2025   Encounter department: [facility] [address]   :  Assessment & Plan  Abscess of back  Treat with antibiotic.   Advise warm compresses. It does appear it is coming to a head so hopefully it will open and drain on its own.   If size and pain is not improving then he will need I&D.               History of Present Illness   Has something on back. Wife told him it was ugly. Has h/o sebaceous cysts. Noticed about 1 week ago. Painful. No drainage. No fever or chills.       Review of Systems   Constitutional:  Negative for chills and fever.   Skin:  Positive for rash.       Objective   /80 (Patient Position: Sitting, Cuff Size: Standard)   Pulse 74   Temp (!) 97 °F (36.1 °C) (Tympanic)   Ht 5' 9\" (1.753 m)   Wt 112 kg (247 lb)   SpO2 98%   BMI 36.48 kg/m²      Physical Exam  Vitals reviewed.   Constitutional:       Appearance: Normal appearance.   Cardiovascular:      Rate and Rhythm: Normal rate and regular rhythm.      Heart sounds: Normal heart sounds. No murmur heard.  Pulmonary:      Effort: Pulmonary effort is normal.      Breath sounds: Normal breath sounds.   Skin:     General: Skin is warm and dry.      Comments: See media pic   Neurological:      Mental Status: He is alert and oriented to person, place, and time.   Psychiatric:         Mood and Affect: Mood normal.         Behavior: Behavior normal.         Thought Content: Thought content normal.         Judgment: Judgment normal.         "

## 2025-05-24 DIAGNOSIS — I10 ESSENTIAL HYPERTENSION: ICD-10-CM

## 2025-05-25 RX ORDER — AMLODIPINE BESYLATE 5 MG/1
5 TABLET ORAL DAILY
Qty: 90 TABLET | Refills: 0 | Status: SHIPPED | OUTPATIENT
Start: 2025-05-25

## (undated) DEVICE — FILTER STRAW 1.7

## (undated) DEVICE — GLOVE SRG BIOGEL 7

## (undated) DEVICE — NEEDLE HYPO 22G X 1-1/2 IN

## (undated) DEVICE — OCCLUSIVE GAUZE STRIP,3% BISMUTH TRIBROMOPHENATE IN PETROLATUM BLEND: Brand: XEROFORM

## (undated) DEVICE — SYRINGE 3ML LL

## (undated) DEVICE — GLOVE INDICATOR PI UNDERGLOVE SZ 6.5 BLUE

## (undated) DEVICE — SYRINGE 20ML LL

## (undated) DEVICE — PREP SURGICAL PURPREP 26ML

## (undated) DEVICE — INTENDED FOR TISSUE SEPARATION, AND OTHER PROCEDURES THAT REQUIRE A SHARP SURGICAL BLADE TO PUNCTURE OR CUT.: Brand: BARD-PARKER ® CARBON RIB-BACK BLADES

## (undated) DEVICE — STOCKINETTE IMPERVIOUS LG

## (undated) DEVICE — 3000CC GUARDIAN II: Brand: GUARDIAN

## (undated) DEVICE — GLOVE SRG BIOGEL 6.5

## (undated) DEVICE — STRAIGHT CATH RED RUBBER 12FR

## (undated) DEVICE — TUBING SUCTION 5MM X 12 FT

## (undated) DEVICE — GLOVE INDICATOR PI UNDERGLOVE SZ 7 BLUE

## (undated) DEVICE — ACE WRAP 6 IN UNSTERILE

## (undated) DEVICE — DRAPE SHEET THREE QUARTER

## (undated) DEVICE — SURGI KIT INSTRUMENT ORGANIZER

## (undated) DEVICE — NEEDLE 18 G X 1 1/2

## (undated) DEVICE — GLOVE INDICATOR PI UNDERGLOVE SZ 7.5 BLUE

## (undated) DEVICE — CAST PADDING 6 IN STERILE

## (undated) DEVICE — BLADE SHAVER CUTTER BN 4MM 13CM COOLCUT

## (undated) DEVICE — SUT ETHILON 4-0 PS-2 18 IN 1667H

## (undated) DEVICE — CUFF TOURNIQUET 30 X 4 IN QUICK CONNECT DISP 1BLA

## (undated) DEVICE — BETHLEHEM UNIVERSAL  ARTHRO PK: Brand: CARDINAL HEALTH

## (undated) DEVICE — TUBING ARTHROSCOPIC WAVE  MAIN PUMP

## (undated) DEVICE — VIAL DECANTER